# Patient Record
Sex: MALE | Race: WHITE | NOT HISPANIC OR LATINO | URBAN - METROPOLITAN AREA
[De-identification: names, ages, dates, MRNs, and addresses within clinical notes are randomized per-mention and may not be internally consistent; named-entity substitution may affect disease eponyms.]

---

## 2016-07-29 RX ORDER — MIDODRINE HYDROCHLORIDE 2.5 MG/1
1 TABLET ORAL
Qty: 0 | Refills: 0 | COMMUNITY
Start: 2016-07-29

## 2018-01-16 ENCOUNTER — INPATIENT (INPATIENT)
Facility: HOSPITAL | Age: 68
LOS: 0 days | Discharge: HOME | DRG: 312 | End: 2018-01-17
Attending: HOSPITALIST | Admitting: HOSPITALIST
Payer: MEDICARE

## 2018-01-16 VITALS
HEART RATE: 110 BPM | SYSTOLIC BLOOD PRESSURE: 113 MMHG | RESPIRATION RATE: 20 BRPM | TEMPERATURE: 98 F | DIASTOLIC BLOOD PRESSURE: 70 MMHG | OXYGEN SATURATION: 93 % | WEIGHT: 154.98 LBS

## 2018-01-16 DIAGNOSIS — Z96.7 PRESENCE OF OTHER BONE AND TENDON IMPLANTS: Chronic | ICD-10-CM

## 2018-01-16 LAB
ALBUMIN SERPL ELPH-MCNC: 3.6 G/DL — SIGNIFICANT CHANGE UP (ref 3.3–5)
ALP SERPL-CCNC: 65 U/L — SIGNIFICANT CHANGE UP (ref 40–120)
ALT FLD-CCNC: 6 U/L — LOW (ref 10–45)
ANION GAP SERPL CALC-SCNC: 14 MMOL/L — SIGNIFICANT CHANGE UP (ref 5–17)
APTT BLD: 46 SEC — HIGH (ref 27.5–37.4)
AST SERPL-CCNC: 11 U/L — SIGNIFICANT CHANGE UP (ref 10–40)
BASE EXCESS BLDV CALC-SCNC: -3.9 MMOL/L — SIGNIFICANT CHANGE UP
BASOPHILS NFR BLD AUTO: 0.2 % — SIGNIFICANT CHANGE UP (ref 0–2)
BILIRUB SERPL-MCNC: 0.4 MG/DL — SIGNIFICANT CHANGE UP (ref 0.2–1.2)
BLD GP AB SCN SERPL QL: NEGATIVE — SIGNIFICANT CHANGE UP
BUN SERPL-MCNC: 20 MG/DL — SIGNIFICANT CHANGE UP (ref 7–23)
CA-I SERPL-SCNC: 1.06 MMOL/L — LOW (ref 1.12–1.3)
CALCIUM SERPL-MCNC: 9.1 MG/DL — SIGNIFICANT CHANGE UP (ref 8.4–10.5)
CHLORIDE SERPL-SCNC: 104 MMOL/L — SIGNIFICANT CHANGE UP (ref 96–108)
CK MB CFR SERPL CALC: <1 NG/ML — SIGNIFICANT CHANGE UP (ref 0–6.7)
CK SERPL-CCNC: 21 U/L — LOW (ref 30–200)
CO2 SERPL-SCNC: 23 MMOL/L — SIGNIFICANT CHANGE UP (ref 22–31)
CREAT SERPL-MCNC: 0.85 MG/DL — SIGNIFICANT CHANGE UP (ref 0.5–1.3)
EOSINOPHIL NFR BLD AUTO: 0.6 % — SIGNIFICANT CHANGE UP (ref 0–6)
GAS PNL BLDV: 138 MMOL/L — SIGNIFICANT CHANGE UP (ref 138–146)
GAS PNL BLDV: SIGNIFICANT CHANGE UP
GAS PNL BLDV: SIGNIFICANT CHANGE UP
GLUCOSE SERPL-MCNC: 136 MG/DL — HIGH (ref 70–99)
HCO3 BLDV-SCNC: 23 MMOL/L — SIGNIFICANT CHANGE UP (ref 20–27)
HCT VFR BLD CALC: 29.9 % — LOW (ref 39–50)
HGB BLD-MCNC: 10.1 G/DL — LOW (ref 13–17)
INR BLD: 2.93 — HIGH (ref 0.88–1.16)
LACTATE SERPL-SCNC: 2.6 MMOL/L — HIGH (ref 0.5–2)
LYMPHOCYTES # BLD AUTO: 12.9 % — LOW (ref 13–44)
MCHC RBC-ENTMCNC: 30.1 PG — SIGNIFICANT CHANGE UP (ref 27–34)
MCHC RBC-ENTMCNC: 33.8 G/DL — SIGNIFICANT CHANGE UP (ref 32–36)
MCV RBC AUTO: 89 FL — SIGNIFICANT CHANGE UP (ref 80–100)
MONOCYTES NFR BLD AUTO: 7.9 % — SIGNIFICANT CHANGE UP (ref 2–14)
NEUTROPHILS NFR BLD AUTO: 78.4 % — HIGH (ref 43–77)
OB PNL STL: NEGATIVE — SIGNIFICANT CHANGE UP
PCO2 BLDV: 50 MMHG — SIGNIFICANT CHANGE UP (ref 41–51)
PH BLDV: 7.28 — LOW (ref 7.32–7.43)
PLATELET # BLD AUTO: 316 K/UL — SIGNIFICANT CHANGE UP (ref 150–400)
PO2 BLDV: 33 MMHG — SIGNIFICANT CHANGE UP
POTASSIUM BLDV-SCNC: 4.1 MMOL/L — SIGNIFICANT CHANGE UP (ref 3.5–4.9)
POTASSIUM SERPL-MCNC: 4.3 MMOL/L — SIGNIFICANT CHANGE UP (ref 3.5–5.3)
POTASSIUM SERPL-SCNC: 4.3 MMOL/L — SIGNIFICANT CHANGE UP (ref 3.5–5.3)
PROT SERPL-MCNC: 7 G/DL — SIGNIFICANT CHANGE UP (ref 6–8.3)
PROTHROM AB SERPL-ACNC: 33.2 SEC — HIGH (ref 9.8–12.7)
RAPID RVP RESULT: SIGNIFICANT CHANGE UP
RBC # BLD: 3.36 M/UL — LOW (ref 4.2–5.8)
RBC # FLD: 12.8 % — SIGNIFICANT CHANGE UP (ref 10.3–16.9)
RH IG SCN BLD-IMP: POSITIVE — SIGNIFICANT CHANGE UP
SAO2 % BLDV: 48 % — SIGNIFICANT CHANGE UP
SODIUM SERPL-SCNC: 141 MMOL/L — SIGNIFICANT CHANGE UP (ref 135–145)
TROPONIN T SERPL-MCNC: <0.01 NG/ML — SIGNIFICANT CHANGE UP (ref 0–0.01)
WBC # BLD: 14.2 K/UL — HIGH (ref 3.8–10.5)
WBC # FLD AUTO: 14.2 K/UL — HIGH (ref 3.8–10.5)

## 2018-01-16 PROCEDURE — 70450 CT HEAD/BRAIN W/O DYE: CPT | Mod: 26

## 2018-01-16 PROCEDURE — 99285 EMERGENCY DEPT VISIT HI MDM: CPT | Mod: 25

## 2018-01-16 PROCEDURE — 99223 1ST HOSP IP/OBS HIGH 75: CPT | Mod: GC

## 2018-01-16 PROCEDURE — 71045 X-RAY EXAM CHEST 1 VIEW: CPT | Mod: 26

## 2018-01-16 PROCEDURE — 93010 ELECTROCARDIOGRAM REPORT: CPT

## 2018-01-16 PROCEDURE — 99232 SBSQ HOSP IP/OBS MODERATE 35: CPT | Mod: GC

## 2018-01-16 RX ORDER — DEXTROSE 50 % IN WATER 50 %
25 SYRINGE (ML) INTRAVENOUS ONCE
Qty: 0 | Refills: 0 | Status: DISCONTINUED | OUTPATIENT
Start: 2018-01-16 | End: 2018-01-17

## 2018-01-16 RX ORDER — SODIUM CHLORIDE 9 MG/ML
1000 INJECTION INTRAMUSCULAR; INTRAVENOUS; SUBCUTANEOUS
Qty: 0 | Refills: 0 | Status: DISCONTINUED | OUTPATIENT
Start: 2018-01-16 | End: 2018-01-17

## 2018-01-16 RX ORDER — INSULIN LISPRO 100/ML
VIAL (ML) SUBCUTANEOUS
Qty: 0 | Refills: 0 | Status: DISCONTINUED | OUTPATIENT
Start: 2018-01-16 | End: 2018-01-17

## 2018-01-16 RX ORDER — SODIUM CHLORIDE 9 MG/ML
1000 INJECTION INTRAMUSCULAR; INTRAVENOUS; SUBCUTANEOUS ONCE
Qty: 0 | Refills: 0 | Status: COMPLETED | OUTPATIENT
Start: 2018-01-16 | End: 2018-01-16

## 2018-01-16 RX ORDER — DEXTROSE 50 % IN WATER 50 %
12.5 SYRINGE (ML) INTRAVENOUS ONCE
Qty: 0 | Refills: 0 | Status: DISCONTINUED | OUTPATIENT
Start: 2018-01-16 | End: 2018-01-17

## 2018-01-16 RX ORDER — TRANEXAMIC ACID 100 MG/ML
1000 INJECTION, SOLUTION INTRAVENOUS ONCE
Qty: 0 | Refills: 0 | Status: COMPLETED | OUTPATIENT
Start: 2018-01-16 | End: 2018-01-16

## 2018-01-16 RX ORDER — DEXTROSE 50 % IN WATER 50 %
1 SYRINGE (ML) INTRAVENOUS ONCE
Qty: 0 | Refills: 0 | Status: DISCONTINUED | OUTPATIENT
Start: 2018-01-16 | End: 2018-01-17

## 2018-01-16 RX ORDER — SODIUM CHLORIDE 9 MG/ML
1000 INJECTION, SOLUTION INTRAVENOUS
Qty: 0 | Refills: 0 | Status: DISCONTINUED | OUTPATIENT
Start: 2018-01-16 | End: 2018-01-17

## 2018-01-16 RX ORDER — PIPERACILLIN AND TAZOBACTAM 4; .5 G/20ML; G/20ML
4.5 INJECTION, POWDER, LYOPHILIZED, FOR SOLUTION INTRAVENOUS ONCE
Qty: 0 | Refills: 0 | Status: DISCONTINUED | OUTPATIENT
Start: 2018-01-16 | End: 2018-01-16

## 2018-01-16 RX ORDER — GLUCAGON INJECTION, SOLUTION 0.5 MG/.1ML
1 INJECTION, SOLUTION SUBCUTANEOUS ONCE
Qty: 0 | Refills: 0 | Status: DISCONTINUED | OUTPATIENT
Start: 2018-01-16 | End: 2018-01-17

## 2018-01-16 RX ADMIN — TRANEXAMIC ACID 220 MILLIGRAM(S): 100 INJECTION, SOLUTION INTRAVENOUS at 20:32

## 2018-01-16 RX ADMIN — SODIUM CHLORIDE 1000 MILLILITER(S): 9 INJECTION INTRAMUSCULAR; INTRAVENOUS; SUBCUTANEOUS at 20:21

## 2018-01-16 RX ADMIN — TRANEXAMIC ACID 220 MILLIGRAM(S): 100 INJECTION, SOLUTION INTRAVENOUS at 20:27

## 2018-01-16 RX ADMIN — SODIUM CHLORIDE 1000 MILLILITER(S): 9 INJECTION INTRAMUSCULAR; INTRAVENOUS; SUBCUTANEOUS at 20:22

## 2018-01-16 RX ADMIN — SODIUM CHLORIDE 125 MILLILITER(S): 9 INJECTION INTRAMUSCULAR; INTRAVENOUS; SUBCUTANEOUS at 19:39

## 2018-01-16 NOTE — H&P ADULT - PROBLEM SELECTOR PLAN 4
Patient in sinus, rate controlled. Not on any rate control medication as an outpatient.   -Currently on Eliquis 5mg ONCE daily (will have to check dose with PMD in AM) and will hold for now given INR ~3 and gum bleeding -Hold Metformin while inpatient  -As per patient's wife, she give him Lantus in the morning, but only if his glucose if >150. He did receive his dose this morning.  -Will decrease the dose to 10 units in the AM given patient will be on controlled diet inpatient and as per old records patient was on this dose and sent home on only 5 units Lantus given consistently well controlled glucose levels.

## 2018-01-16 NOTE — ED ADULT TRIAGE NOTE - OTHER COMPLAINTS
biba from doctors for dizziness , per EMS was hypotensive in office in the 60's/ pt on eliquis for a fib

## 2018-01-16 NOTE — ED PROVIDER NOTE - MEDICAL DECISION MAKING DETAILS
67 male with hypotension hx of orthostatic hypotension  unclear source  slight lactate elev  now normotensive seen and eval by icu who feel no indication for 7L or ICU  ok for floors  repeat lactate ordered

## 2018-01-16 NOTE — H&P ADULT - PROBLEM SELECTOR PLAN 9
No prophylaxis needed, patient on eliquis which will currently be held given coagulapathy and gum bleeding

## 2018-01-16 NOTE — CONSULT NOTE ADULT - ASSESSMENT
67y Male with a Hx of orthostatic hypotension, Diabetes Mellitus Type 2 complicated by neuropathy now wheelchair-bound for approx 3 years, Afib on Eliquis, Hypertension, hyperlipidemia, benign brain tumor (unclear etiology), and alcohol abuse presenting with hypotension (now resolved), leukocytosis, and gingival bleeding in the setting of NOAC and elevated INR.    #Hypotension  Long history of orthostatic hypotension on midodrine--which he took today--and poor functional status, predominantly bedbound with wheelchair use. Baseline BPs 80s-100s/60s-70s per outpatient BP log that wife has at bedside.   Recommendations:  -    #Leukocytosis  WBC 14 without any fevers/chills or localizing symptoms. Chest Xray is clear and urine is pending.   Recommendations:  -Check RVP.  -F/u UA and Cultures.    #Gingival bleeding      #Dispo  - 67y Male with a Hx of orthostatic hypotension, Diabetes Mellitus Type 2 complicated by neuropathy now wheelchair-bound for approx 3 years, Afib on Eliquis, Hypertension, hyperlipidemia, benign brain tumor (unclear etiology), and alcohol abuse presenting with hypotension (now resolved), leukocytosis, and gingival bleeding in the setting of NOAC and elevated INR.    #Hypotension  Long history of orthostatic hypotension on midodrine--which he took today--and poor functional status, predominantly bedbound with wheelchair use. Baseline BPs 80s-100s/60s-70s per outpatient BP log that wife has at bedside.   Recommendations:  -Check orthostatic BP. Consider additional IVFs prn.  -Increase midodrine to 5mg.    #Paroxysmal AFib  Currently in sinus rhythm with APCs. Not on beta blocker or CCB, presumably 2/2 hypotension.  Recommendations:  -Hold Eliquis pending resolution of INR.  -Consider discontinuing duloxetine as this may potentiate the anticoagulant effects of apixaban and other NOACs.    #Leukocytosis  WBC 14 without any fevers/chills or localizing symptoms. Chest Xray is clear and urine is pending.   Recommendations:  -Check RVP.  -F/u UA and Cultures.    #Gingival bleeding  Frequent gingival bleeding with tooth brushing. Resolved with tranexamic acid  Recommendations:  -Hold Eliquis.  -If recurrent bleeding consider DDAVP.    #Dispo  -No indication for MICU or telemetry at this time.

## 2018-01-16 NOTE — ED PROVIDER NOTE - OBJECTIVE STATEMENT
68 yo M with hx of brain CA Afib DM HTN on Eliquis x 1 month- with apparent dizziness and episode of hypotension in PMD office 2 hrs ago  no focal weakness no headache or neck stiffness, no visual changes - no abd pain or N/V  no melena or hematochezia or hematemesis- no epistaxis - no syncope or back pain-- sent for further evaluation 68 yo M with hx of orthostatic hypotension, brain CA Afib DM HTN on Eliquis x 1 month- with apparent dizziness and episode of hypotension in PMD office 2 hrs ago  no focal weakness no headache or neck stiffness, no visual changes - no abd pain or N/V  no melena or hematochezia or hematemesis- no epistaxis - no syncope or back pain-- sent for further evaluation

## 2018-01-16 NOTE — H&P ADULT - NSHPPHYSICALEXAM_GEN_ALL_CORE
.  VITAL SIGNS:  T(C): 36.6 (01-17-18 @ 00:43), Max: 36.7 (01-16-18 @ 17:44)  T(F): 97.9 (01-17-18 @ 00:43), Max: 98.1 (01-16-18 @ 17:44)  HR: 75 (01-17-18 @ 00:43) (73 - 110)  BP: 114/70 (01-17-18 @ 00:43) (101/58 - 138/71)  BP(mean): --  RR: 18 (01-17-18 @ 00:43) (16 - 20)  SpO2: 100% (01-17-18 @ 00:43) (93% - 100%)  Wt(kg): --    PHYSICAL EXAM:    Constitutional: WDWN resting comfortably in bed; NAD  Head: NC/AT  Eyes: PERRL, EOMI, clear conjunctiva  ENT: no nasal discharge or epistaxis; uvula midline, no oropharyngeal erythema or exudates; MMM.  Poor dentition, pooled dark blood in lower gum area.  Neck: supple; no JVD or thyromegaly  Respiratory: CTA B/L; no W/R/R, no retractions  Cardiac: +S1/S2; RRR; no M/R/G; PMI non-displaced  Gastrointestinal: soft, NT/ND; no rebound or guarding; +BSx4  Genitourinary: normal external genitalia, no suprapubic tenderness or distention  Back: spine midline, no bony tenderness or step-offs; no CVAT B/L  Extremities: WWP, no clubbing or cyanosis; no peripheral edema  Musculoskeletal: NROM x4; no joint swelling, tenderness or erythema  Vascular: 2+ DP/PT pulses B/L  Dermatologic: skin warm, dry and intact; no rashes, wounds, or scars. No ecchymoses.  Lymphatic: no submandibular or cervical LAD  Neurologic: AAOx3; CNII-XII grossly intact; no focal deficits. 5/5 strength all extremities, sensation intact and symmetrical.  Mild tremor right side (chronic)  Psychiatric: affect and characteristics of appearance, verbalizations, behaviors are appropriate

## 2018-01-16 NOTE — ED ADULT NURSE NOTE - CHPI ED SYMPTOMS NEG
no blurred vision/no loss of consciousness/no nausea/no numbness/no vomiting/no fever/no weakness/no change in level of consciousness/no confusion

## 2018-01-16 NOTE — H&P ADULT - PMH
Atrial fibrillation    Benign neoplasm of brain  diagnosed in 2007  Essential hypertension  HTN (hypertension)  Hyperlipidemia  HLD (hyperlipidemia)  Mononeuritis  Neuropathy  Type 2 diabetes mellitus  DM (diabetes mellitus) Atrial fibrillation    Benign neoplasm of brain  diagnosed in 2007  Hyperlipidemia  HLD (hyperlipidemia)  Mononeuritis  Neuropathy  Orthostatic hypotension    Type 2 diabetes mellitus  DM (diabetes mellitus)

## 2018-01-16 NOTE — H&P ADULT - PROBLEM SELECTOR PLAN 5
Continue Atorvastatin 40mg at bedtime As per patient's wife, patient is bedbound secondary to diabetic neuropathy  He was previously taking Cymbalta, but his wife stopped it 2 weeks ago because she was concerned it could lower his seizure threshold after reading the side effects.   As per ICU consult, Cymbalta can increase anticoagulation effects of NOACs.  Will continue to hold given coagulopathy on eliquis.

## 2018-01-16 NOTE — H&P ADULT - HISTORY OF PRESENT ILLNESS
Patient is a 67 year old male, PMH seizure disorder, orthostatic hypotension, afib on eliquis, wheelchair bound secondary to neuropathy, DMII , presenting with hypotension from outpatient office visit. Patient was going to a disability physical evaluation by Dr. Dan (not his usual PMD), and was complaining of weakness/dizziness. His BP was initially 80s/50s and when EMS arrived, decreased to 60s/40s. Of note, as per his wife this is an ongoing issue, and he was started on Midrodrine on Dec 12th, 2017. His wife has a log of his BPs at home, usually runs SBP 90s-100, with some readings SBP 70s-80s. She usually takes his pressure lying down, but he does feel dizzy/lightheaded when he sits up. He mainly is bed bound and gets around in a wheel chair. His last seizure was over 2 months ago and he is compliant with Keppra.  Of note, wife states she aggressively brushed his teeth this morning because he was going to the doctor and has poor dentition 2/2 not going to the dentist since 2010.  Currently patient feels improved s/p fluids, denies dizziness. Patient is a 67 year old male, PMH seizure disorder, orthostatic hypotension, afib on eliquis, wheelchair bound secondary to neuropathy, DMII , presenting with hypotension from outpatient office visit. Patient was going to a disability physical evaluation by Dr. Dan (not his usual PMD), and was complaining of weakness/dizziness. His BP was initially 80s/50s and when EMS arrived, decreased to 60s/40s. Of note, as per his wife this is an ongoing issue, and he was started on Midrodrine on Dec 12th, 2017. His wife has a log of his BPs at home, usually runs SBP 90s-100, with some readings SBP 70s-80s. She usually takes his pressure lying down, but he does feel dizzy/lightheaded when he sits up. He mainly is bed bound and gets around in a wheel chair. His last seizure was over 2 months ago and he is compliant with Keppra.  Of note, wife states she aggressively brushed his teeth this morning because he was going to the doctor and has poor dentition 2/2 not going to the dentist since 2010. Also, his wife stopped giving him Cymbalta 2 weeks ago because she was reading the information packet and saw that it can decreased seizure threshold.  Currently patient feels improved s/p fluids, denies dizziness. Patient is a 67 year old male, PMH seizure disorder, orthostatic hypotension, afib on eliquis, wheelchair bound secondary to neuropathy, DMII , presenting with hypotension from outpatient office visit. Patient was going to a disability physical evaluation by Dr. Dan (not his usual PMD), and was complaining of weakness/dizziness. His BP was initially 80s/50s and when EMS arrived, decreased to 60s/40s. Of note, as per his wife this is an ongoing issue, and he was started on Midrodrine on Dec 12th, 2017. His wife has a log of his BPs at home, usually runs SBP 90s-100, with some readings SBP 70s-80s. She usually takes his pressure lying down, but he does feel dizzy/lightheaded when he sits up. He mainly is bed bound and gets around in a wheel chair. His last seizure was over 2 months ago and he is compliant with Keppra.  Of note, wife states she aggressively brushed his teeth this morning because he was going to the doctor and has poor dentition 2/2 not going to the dentist since 2010. Also, his wife stopped giving him Cymbalta 2 weeks ago because she was reading the information packet and saw that it can decrease seizure threshold.  Currently patient feels improved s/p fluids, denies dizziness.

## 2018-01-16 NOTE — H&P ADULT - ATTENDING COMMENTS
Pt seen and examined at bedside on 1/17/2017 @ 430 am    Agree with HPI, ROS as above.     VS, Labs, FH, SH, allergies, medications, imaging reviewed. I personally reviewed the EKG - NSR. I discussed the case with Dr. Bacon (ED attending) - patient with orthostatic hypotension, now improved but also coagulopathy will need admission overnight. Agree with physical exam as above    A/P: Patient is a 67 year old male, PMH seizure disorder, orthostatic hypotension, afib on Eliquis, wheelchair bound secondary to neuropathy, DMII , presenting with hypotension from outpatient office visit, improved s/p fluids, also found to have gum bleeding and coagulopathy.    **Orthostatic Hypotension  -Pt with episode of symptomatic orthostatic hypotension c/w patient's baseline at home (has symptoms when sitting up)  -Currently resolved S/P IVF, likely chronic 2/2 long standing DM and immobility (patient spends most of his time in bed)  -Will increase dose of Midodrine    **Coagulopathy  -Pt with elevated INR/PTT/PT in the setting of Eliquis use, with associated gum oozing s/p vigorous dental hygiene  -Gum oozing currently resolved s/p application of tranexamic acid  -Hold Eliquis overnight  -Will need to obtain collateral in AM from patient's PMD regarding dosage of Eliquis, previous bleed with Xarelto, possible Heme consult in AM  -CBC stable, pt HD stable    Plan otherwise as outlined above....

## 2018-01-16 NOTE — CONSULT NOTE ADULT - SUBJECTIVE AND OBJECTIVE BOX
CHIEF COMPLAINT:  Patient is a 67y old  Male who presents with a chief complaint of hypotension.    ED Provider Note reviewed in detail.     HPI:  MARIBEL GONZALEZ is a 67y Male with a Hx of orthostatic hypotension, Diabetes Mellitus Type 2 complicated by neuropathy now wheelchair-bound for approx 3 years, Afib on Eliquis, Hypertension, hyperlipidemia, benign brain tumor (unclear etiology), and alcohol abuse who presented with hypotension. At baseline, patient has known orthostatic hypotension with average BPs in 80s-100s/60s-70s. He has poor functional status, spending the majority of his time in bed, and is noted to have worsening episodes of orthostasis whenever he is in his wheelchair. He takes midodrine 2.5mg BID for orthostatic hypotension and reports taking his medication today. Today, he was taken by his spouse--who is also his caretaker--for a disability appointment. While there he complained of lightheadedness and was found to have a SBP in the 70s. EMS was called for sympotmatic hypotension and on arrival they would his BP to be 60s/40s. At this time he states that his lightheadedness as resolved after receiving IV fluids. He denies any fevers/chills, night sweats, sick contacts, sore throat, rhinorrhea, cough, shortness of breath, nausea/vomiting/diarrhea, flank pain, and dysuria. He denies palpitations and chest pain. His wife reports that he has had weight loss and decreased appetite over the past 2-3 months. She also states that at baseline he is more interactive than he is at present.    He is also complaining of gum bleeding. He reports frequent episodes while on Xarelto and was recently switched to Eliquis in an attempt to decrease frequency and severity. He denies any history of GI bleeding. Has never had colonoscopy or EGD. Denies nosebleeds. Has never taken coumadin. His wife states that his last dental appointment was in 2010. She reports gum bleeding with tooth brushing, and states that he brushed vigorously today prior to his disability appointment.    In the ED, he was given 2L NS, started on maintenance at 125 cc/hr, tranexamic acid oral and IV, and Zosyn.    PAST MEDICAL & SURGICAL HISTORY:  Atrial fibrillation  Essential hypertension: HTN (hypertension)  Type 2 diabetes mellitus: DM (diabetes mellitus)  Hyperlipidemia: HLD (hyperlipidemia)  Benign neoplasm of brain: diagnosed in 2007  Mononeuritis: Neuropathy  S/P ORIF (open reduction internal fixation) fracture: Right Humerus  Alcohol abuse disorder  orthostatic hypotension    FAMILY HISTORY:  Family history of colon cancer (Father)  Family history of diabetes mellitus    SOCIAL HISTORY:  former smoker  alcohol abuse, now abstinent  lives at home, wife is primary caretaker  wheelchair bound, but spends the majority of his time in bed    REVIEW OF SYSTEMS:  10 system review of systems negative except as per HPI    Vital Signs Last 24 Hrs  T(C): 36.6 (16 Jan 2018 20:33), Max: 36.7 (16 Jan 2018 17:44)  T(F): 97.8 (16 Jan 2018 20:33), Max: 98.1 (16 Jan 2018 17:44)  HR: 73 (16 Jan 2018 20:33) (73 - 110)  BP: 138/71 (16 Jan 2018 20:33) (104/63 - 138/71)  BP(mean): --  RR: 16 (16 Jan 2018 20:33) (16 - 20)  SpO2: 100% (16 Jan 2018 20:33) (93% - 100%)      Weight (kg): 70.3 (01-16 @ 17:44)      POCT Blood Glucose.: 137 mg/dL (16 Jan 2018 17:41)      PHYSICAL EXAM:  Gen:       no acute distress, disheveled  Skin:       bilateral, perioral pustules, no rashes on the trunk or extremities  HEENT:  +gingival bleeding  Neck:     no JVD  Cardiac: regular rate and rhythm, S1/S2 present, no murmur/gallop/rub  Pulm:     clear to auscultation bilaterally, no wheezes/crackles  Abd:      soft/nontender/nondistended, normoactive bowel sounds  :       no CVA tenderness  Vasc:     warm and well perfused, 2+ dorsalis pedis and radial pulses, no pedal edema  Ext:        normal range of motion, atraumatic  Neuro:   awake, alert and oriented x3, Cranial Nerves II-XII grossly intact    Home Medications:  atorvastatin 20 mg oral tablet: 1 tab(s) orally once a day (at bedtime) (16 Jan 2018 18:04)  DULoxetine 60 mg oral delayed release capsule: 1 cap(s) orally once a day (16 Jan 2018 18:04)  Eliquis 5mg BID  (16 Jan 2018 18:04)  folic acid 1 mg oral tablet: 1 tab(s) orally once a day (16 Jan 2018 18:04)  HumaLOG KwikPen 100 units/mL subcutaneous solution:  subcutaneous Before Meals and at Bedtime Correctional Scale  (16 Jan 2018 18:04)  Lantus Solostar Pen 100 units/mL subcutaneous solution: 5 unit(s) subcutaneous once a day (at bedtime). Note: Pt has not required any lantus in hospital. Was taking 20 units BID at home. hgb 1c 7.1. Increased dose based on requirements.  (16 Jan 2018 18:04)  midodrine 2.5 mg oral tablet: 1 tab(s) orally 2 times a day (16 Jan 2018 18:04)  Multiple Vitamins oral tablet: 1 tab(s) orally once a day (16 Jan 2018 18:04)  thiamine 100 mg oral tablet: 1 tab(s) orally once a day (16 Jan 2018 18:04)        Allergies  Allergy Status Unknown    Intolerances  codeine (dependence)    LABS:                        10.1   14.2  )-----------( 316      ( 16 Jan 2018 18:13 )             29.9    Mean Cell Volume: 89.0 fL (01-16 @ 18:13)    01-16    141  |  104  |  20  ----------------------------<  136<H>  4.3   |  23  |  0.85    Ca    9.1      16 Jan 2018 18:13    TPro  7.0  /  Alb  3.6  /  TBili  0.4  /  DBili  x   /  AST  11  /  ALT  6<L>  /  AlkPhos  65  01-16    PT/INR - ( 16 Jan 2018 18:13 )   PT: 33.2 sec;   INR: 2.93     PTT - ( 16 Jan 2018 18:13 )  PTT:46.0 sec      MICROBIOLOGY: REVIEWED  culture pending    CARDIOLOGY: REVIEWED  EKG (my read): normal sinus rhythm with APCs    RADIOLOGY: REVIEWED  Xray chest (my read): flattening of both hemidiaphragms, no infiltrate

## 2018-01-16 NOTE — ED ADULT NURSE NOTE - FAMILY HISTORY
Family history of diabetes mellitus     Father  Still living? Unknown  Family history of colon cancer, Age at diagnosis: Age Unknown

## 2018-01-16 NOTE — H&P ADULT - PROBLEM SELECTOR PLAN 8
Diabetic diet Unclear what patient's psychiatric diagnosis is, but will continue current psychiatric meds. Need collateral information in the AM.  -Continue Risperidone 1mg in the AM, mid day, and 2mg at bedtime  -Continue Mirtazapine 15mg at bedtime

## 2018-01-16 NOTE — ED ADULT NURSE NOTE - NS ED NOTE ABUSE RESPONSE YN
Yes
Airway patent, Nasal mucosa clear. Mouth with normal mucosa. Throat has no vesicles, no oropharyngeal exudates and uvula is midline.

## 2018-01-16 NOTE — H&P ADULT - PROBLEM SELECTOR PLAN 7
No prophylaxis needed, patient on eliquis which will currently be held given coagulapathy and gum bleeding Continue Atorvastatin 40mg at bedtime

## 2018-01-16 NOTE — H&P ADULT - PROBLEM SELECTOR PLAN 6
Unclear what patient's psychiatric diagnosis is, but will continue current psychiatric meds. Need collateral information in the AM.  -Continue Risperidone 1mg in the AM, mid day, and 2mg at bedtime  -Continue Mirtazapine 15mg at bedtime Patient in sinus, rate controlled. Not on any rate control medication as an outpatient.   -Currently on Eliquis 5mg ONCE daily (will have to check dose with PMD in AM) and will hold for now given INR ~3 and gum bleeding

## 2018-01-16 NOTE — H&P ADULT - ASSESSMENT
Patient is a 67 year old male, PMH seizure disorder, orthostatic hypotension, afib on eliquis, wheelchair bound secondary to neuropathy, DMII , presenting with hypotension from outpatient office visit, improved s/p fluids, also found to have gum bleeding and coagulopathy.

## 2018-01-16 NOTE — H&P ADULT - NSHPLABSRESULTS_GEN_ALL_CORE
.  LABS:                         10.1   14.2  )-----------( 316      ( 2018 18:13 )             29.9         141  |  104  |  20  ----------------------------<  136<H>  4.3   |  23  |  0.85    Ca    9.1      2018 18:13    TPro  7.0  /  Alb  3.6  /  TBili  0.4  /  DBili  x   /  AST  11  /  ALT  6<L>  /  AlkPhos  65      PT/INR - ( 2018 18:13 )   PT: 33.2 sec;   INR: 2.93          PTT - ( 2018 18:13 )  PTT:46.0 sec  Urinalysis Basic - ( 2018 00:30 )    Color: Yellow / Appearance: Clear / S.015 / pH: x  Gluc: x / Ketone: NEGATIVE  / Bili: Negative / Urobili: 0.2 E.U./dL   Blood: x / Protein: NEGATIVE mg/dL / Nitrite: NEGATIVE   Leuk Esterase: Small / RBC: < 5 /HPF / WBC > 10 /HPF   Sq Epi: x / Non Sq Epi: 0-5 /HPF / Bacteria: Present /HPF      CARDIAC MARKERS ( 2018 18:13 )  x     / <0.01 ng/mL / 21 U/L / x     / <1.0 ng/mL    Lactate, Blood: 2.6 mmoL/L ( @ 21:39)      RADIOLOGY, EKG & ADDITIONAL TESTS: Reviewed. .  LABS:                         10.1   14.2  )-----------( 316      ( 2018 18:13 )             29.9         141  |  104  |  20  ----------------------------<  136<H>  4.3   |  23  |  0.85    Ca    9.1      2018 18:13    TPro  7.0  /  Alb  3.6  /  TBili  0.4  /  DBili  x   /  AST  11  /  ALT  6<L>  /  AlkPhos  65      PT/INR - ( 2018 18:13 )   PT: 33.2 sec;   INR: 2.93          PTT - ( 2018 18:13 )  PTT:46.0 sec  Urinalysis Basic - ( 2018 00:30 )    Color: Yellow / Appearance: Clear / S.015 / pH: x  Gluc: x / Ketone: NEGATIVE  / Bili: Negative / Urobili: 0.2 E.U./dL   Blood: x / Protein: NEGATIVE mg/dL / Nitrite: NEGATIVE   Leuk Esterase: Small / RBC: < 5 /HPF / WBC > 10 /HPF   Sq Epi: x / Non Sq Epi: 0-5 /HPF / Bacteria: Present /HPF      CARDIAC MARKERS ( 2018 18:13 )  x     / <0.01 ng/mL / 21 U/L / x     / <1.0 ng/mL    Lactate, Blood: 2.6 mmoL/L ( @ 21:39)      RADIOLOGY, EKG & ADDITIONAL TESTS: Reviewed.  EKG- NSR with APCs  CXR- no consolidation/infiltrates

## 2018-01-16 NOTE — ED ADULT NURSE NOTE - PMH
Atrial fibrillation    Benign neoplasm of brain  diagnosed in 2007  Essential hypertension  HTN (hypertension)  Hyperlipidemia  HLD (hyperlipidemia)  Mononeuritis  Neuropathy  Type 2 diabetes mellitus  DM (diabetes mellitus)

## 2018-01-16 NOTE — ED PROVIDER NOTE - CARE PLAN
Principal Discharge DX:	Hypotension  Secondary Diagnosis:	Coagulopathy  Secondary Diagnosis:	Dizziness

## 2018-01-16 NOTE — CONSULT NOTE ADULT - ATTENDING COMMENTS
This patient was evaluated with the resident and management decisions were made.  I agree with the A/P; see above for the details.  A 66 y/o male with DM2, PAF on eliquis, orthostatic hypotension on midodrine presented with hypotension which improved with IVF and was back to his baseline mental status.  -hypotension (hypovolumic)  -afib, rate controlled  -anemia with bleeding gum which has stabilized  >increase midodrine  >IVF  >this patient is hemodynamically stable and does not need the MICU or [facility]; ypu may reconsult us as needed,

## 2018-01-16 NOTE — H&P ADULT - PROBLEM SELECTOR PLAN 2
Elevated INR and PTT, possibly secondary to eliquis, along with possible interaction of eliquis and Duloxetine (although has been held by his wife for 2 weeks.)  -Given coagulopathy and gingival bleeding, holding Eliquis. No other sources of bleeding at this time.  -Also need clarification of Eliquis dosing as he is prescribed 5mg once per day

## 2018-01-16 NOTE — H&P ADULT - PROBLEM SELECTOR PLAN 1
Patient with known history of orthostatic hypotension, recently started on low dose of midodrine (Dec 12, 2017), presenting with orthostatic hypotension episode (symptomatic upon sitting up, hypotensive to SBP 60s that responded to fluids, s/p 3LNS)   -Will increase to Midodrine 5mg BID  -Monitor BP

## 2018-01-16 NOTE — ED ADULT NURSE NOTE - OBJECTIVE STATEMENT
Pt presents to ED c/o dizziness. upon assessment, pt noted to have dry blood around oral mucosa. Pt states, " I am on eliquis and this happens often" as per wife, "this is an improvement from the Xarelto he was taking" " as per wife, he becomes dizzy, if he is in his wheelchair for long periods of time" Pt AAO x 3 speech is clear and coherent breathing even and unlabored. denies headache, chest pain and SOB at this time. Denies dark tar stool

## 2018-01-16 NOTE — H&P ADULT - PROBLEM SELECTOR PLAN 3
-Hold Metformin while inpatient  -As per patient's wife, she give him Lantus in the morning, but only if his glucose if >150. He did receive his dose this morning.  -Will decrease the dose to 10 units in the AM given patient will be on controlled diet inpatient and as per old records patient was on this dose and sent home on only 5 units lantus given consistently well controlled glucose levels. Patient with baseline Hgb ~8-9, normocytic. Stable. No signs of bleeding other than gingival.   Unclear etiology, will check iron studies, reticulocyte count, haptoglobin, LDH

## 2018-01-16 NOTE — H&P ADULT - NSHPSOCIALHISTORY_GEN_ALL_CORE
Wheelchair bound, lives at home with his wife who is also his caregiver  Current smoker 1ppd  No IVDA

## 2018-01-17 ENCOUNTER — TRANSCRIPTION ENCOUNTER (OUTPATIENT)
Age: 68
End: 2018-01-17

## 2018-01-17 VITALS
HEART RATE: 82 BPM | DIASTOLIC BLOOD PRESSURE: 76 MMHG | RESPIRATION RATE: 17 BRPM | OXYGEN SATURATION: 96 % | TEMPERATURE: 98 F | SYSTOLIC BLOOD PRESSURE: 149 MMHG

## 2018-01-17 DIAGNOSIS — Z29.9 ENCOUNTER FOR PROPHYLACTIC MEASURES, UNSPECIFIED: ICD-10-CM

## 2018-01-17 DIAGNOSIS — R63.8 OTHER SYMPTOMS AND SIGNS CONCERNING FOOD AND FLUID INTAKE: ICD-10-CM

## 2018-01-17 DIAGNOSIS — E78.5 HYPERLIPIDEMIA, UNSPECIFIED: ICD-10-CM

## 2018-01-17 DIAGNOSIS — E11.9 TYPE 2 DIABETES MELLITUS WITHOUT COMPLICATIONS: ICD-10-CM

## 2018-01-17 DIAGNOSIS — G62.9 POLYNEUROPATHY, UNSPECIFIED: ICD-10-CM

## 2018-01-17 DIAGNOSIS — F99 MENTAL DISORDER, NOT OTHERWISE SPECIFIED: ICD-10-CM

## 2018-01-17 DIAGNOSIS — D64.9 ANEMIA, UNSPECIFIED: ICD-10-CM

## 2018-01-17 DIAGNOSIS — I95.1 ORTHOSTATIC HYPOTENSION: ICD-10-CM

## 2018-01-17 DIAGNOSIS — I48.91 UNSPECIFIED ATRIAL FIBRILLATION: ICD-10-CM

## 2018-01-17 DIAGNOSIS — D68.9 COAGULATION DEFECT, UNSPECIFIED: ICD-10-CM

## 2018-01-17 LAB
ANION GAP SERPL CALC-SCNC: 13 MMOL/L — SIGNIFICANT CHANGE UP (ref 5–17)
APPEARANCE UR: CLEAR — SIGNIFICANT CHANGE UP
APTT BLD: 32.4 SEC — SIGNIFICANT CHANGE UP (ref 27.5–37.4)
BACTERIA # UR AUTO: PRESENT /HPF
BILIRUB UR-MCNC: NEGATIVE — SIGNIFICANT CHANGE UP
BUN SERPL-MCNC: 16 MG/DL — SIGNIFICANT CHANGE UP (ref 7–23)
CALCIUM SERPL-MCNC: 8.6 MG/DL — SIGNIFICANT CHANGE UP (ref 8.4–10.5)
CHLORIDE SERPL-SCNC: 105 MMOL/L — SIGNIFICANT CHANGE UP (ref 96–108)
CO2 SERPL-SCNC: 22 MMOL/L — SIGNIFICANT CHANGE UP (ref 22–31)
COLOR SPEC: YELLOW — SIGNIFICANT CHANGE UP
CREAT SERPL-MCNC: 0.64 MG/DL — SIGNIFICANT CHANGE UP (ref 0.5–1.3)
DIFF PNL FLD: NEGATIVE — SIGNIFICANT CHANGE UP
EPI CELLS # UR: SIGNIFICANT CHANGE UP /HPF (ref 0–5)
FERRITIN SERPL-MCNC: 44.2 NG/ML — SIGNIFICANT CHANGE UP (ref 30–400)
GLUCOSE BLDC GLUCOMTR-MCNC: 108 MG/DL — HIGH (ref 70–99)
GLUCOSE BLDC GLUCOMTR-MCNC: 173 MG/DL — HIGH (ref 70–99)
GLUCOSE SERPL-MCNC: 122 MG/DL — HIGH (ref 70–99)
GLUCOSE UR QL: NEGATIVE — SIGNIFICANT CHANGE UP
HAPTOGLOB SERPL-MCNC: 216 MG/DL — HIGH (ref 34–200)
HCT VFR BLD CALC: 29.5 % — LOW (ref 39–50)
HCV AB S/CO SERPL IA: 0.21 S/CO — SIGNIFICANT CHANGE UP
HCV AB SERPL-IMP: SIGNIFICANT CHANGE UP
HGB BLD-MCNC: 9.8 G/DL — LOW (ref 13–17)
INR BLD: 1.54 — HIGH (ref 0.88–1.16)
IRON SATN MFR SERPL: 29 % — SIGNIFICANT CHANGE UP (ref 16–55)
IRON SATN MFR SERPL: 67 UG/DL — SIGNIFICANT CHANGE UP (ref 45–165)
KETONES UR-MCNC: NEGATIVE — SIGNIFICANT CHANGE UP
LDH SERPL L TO P-CCNC: 104 U/L — SIGNIFICANT CHANGE UP (ref 50–242)
LEUKOCYTE ESTERASE UR-ACNC: (no result)
MCHC RBC-ENTMCNC: 30 PG — SIGNIFICANT CHANGE UP (ref 27–34)
MCHC RBC-ENTMCNC: 33.2 G/DL — SIGNIFICANT CHANGE UP (ref 32–36)
MCV RBC AUTO: 90.2 FL — SIGNIFICANT CHANGE UP (ref 80–100)
NITRITE UR-MCNC: NEGATIVE — SIGNIFICANT CHANGE UP
PH UR: 6 — SIGNIFICANT CHANGE UP (ref 5–8)
PLATELET # BLD AUTO: 312 K/UL — SIGNIFICANT CHANGE UP (ref 150–400)
POTASSIUM SERPL-MCNC: 4 MMOL/L — SIGNIFICANT CHANGE UP (ref 3.5–5.3)
POTASSIUM SERPL-SCNC: 4 MMOL/L — SIGNIFICANT CHANGE UP (ref 3.5–5.3)
PROT UR-MCNC: NEGATIVE MG/DL — SIGNIFICANT CHANGE UP
PROTHROM AB SERPL-ACNC: 17.2 SEC — HIGH (ref 9.8–12.7)
RBC # BLD: 3.27 M/UL — LOW (ref 4.2–5.8)
RBC # BLD: 3.27 M/UL — LOW (ref 4.2–5.8)
RBC # FLD: 13 % — SIGNIFICANT CHANGE UP (ref 10.3–16.9)
RBC CASTS # UR COMP ASSIST: < 5 /HPF — SIGNIFICANT CHANGE UP
RETICS/RBC NFR: 1.1 % — SIGNIFICANT CHANGE UP (ref 0.5–2.5)
SODIUM SERPL-SCNC: 140 MMOL/L — SIGNIFICANT CHANGE UP (ref 135–145)
SP GR SPEC: 1.01 — SIGNIFICANT CHANGE UP (ref 1–1.03)
TIBC SERPL-MCNC: 234 UG/DL — SIGNIFICANT CHANGE UP (ref 220–430)
TRANSFERRIN SERPL-MCNC: 202 MG/DL — SIGNIFICANT CHANGE UP (ref 200–360)
UIBC SERPL-MCNC: 167 UG/DL — SIGNIFICANT CHANGE UP (ref 110–370)
UROBILINOGEN FLD QL: 0.2 E.U./DL — SIGNIFICANT CHANGE UP
WBC # BLD: 9.2 K/UL — SIGNIFICANT CHANGE UP (ref 3.8–10.5)
WBC # FLD AUTO: 9.2 K/UL — SIGNIFICANT CHANGE UP (ref 3.8–10.5)
WBC UR QL: > 10 /HPF

## 2018-01-17 PROCEDURE — 84132 ASSAY OF SERUM POTASSIUM: CPT

## 2018-01-17 PROCEDURE — 36415 COLL VENOUS BLD VENIPUNCTURE: CPT

## 2018-01-17 PROCEDURE — 71045 X-RAY EXAM CHEST 1 VIEW: CPT

## 2018-01-17 PROCEDURE — 87186 SC STD MICRODIL/AGAR DIL: CPT

## 2018-01-17 PROCEDURE — 87798 DETECT AGENT NOS DNA AMP: CPT

## 2018-01-17 PROCEDURE — 70450 CT HEAD/BRAIN W/O DYE: CPT

## 2018-01-17 PROCEDURE — 84295 ASSAY OF SERUM SODIUM: CPT

## 2018-01-17 PROCEDURE — 87486 CHLMYD PNEUM DNA AMP PROBE: CPT

## 2018-01-17 PROCEDURE — 82803 BLOOD GASES ANY COMBINATION: CPT

## 2018-01-17 PROCEDURE — 85045 AUTOMATED RETICULOCYTE COUNT: CPT

## 2018-01-17 PROCEDURE — 87150 DNA/RNA AMPLIFIED PROBE: CPT

## 2018-01-17 PROCEDURE — 80053 COMPREHEN METABOLIC PANEL: CPT

## 2018-01-17 PROCEDURE — 83550 IRON BINDING TEST: CPT

## 2018-01-17 PROCEDURE — 82962 GLUCOSE BLOOD TEST: CPT

## 2018-01-17 PROCEDURE — 85730 THROMBOPLASTIN TIME PARTIAL: CPT

## 2018-01-17 PROCEDURE — 86850 RBC ANTIBODY SCREEN: CPT

## 2018-01-17 PROCEDURE — 84484 ASSAY OF TROPONIN QUANT: CPT

## 2018-01-17 PROCEDURE — 80048 BASIC METABOLIC PNL TOTAL CA: CPT

## 2018-01-17 PROCEDURE — 99285 EMERGENCY DEPT VISIT HI MDM: CPT | Mod: 25

## 2018-01-17 PROCEDURE — 87086 URINE CULTURE/COLONY COUNT: CPT

## 2018-01-17 PROCEDURE — 82553 CREATINE MB FRACTION: CPT

## 2018-01-17 PROCEDURE — 82330 ASSAY OF CALCIUM: CPT

## 2018-01-17 PROCEDURE — 81001 URINALYSIS AUTO W/SCOPE: CPT

## 2018-01-17 PROCEDURE — 86901 BLOOD TYPING SEROLOGIC RH(D): CPT

## 2018-01-17 PROCEDURE — 82550 ASSAY OF CK (CPK): CPT

## 2018-01-17 PROCEDURE — 84443 ASSAY THYROID STIM HORMONE: CPT

## 2018-01-17 PROCEDURE — 87040 BLOOD CULTURE FOR BACTERIA: CPT

## 2018-01-17 PROCEDURE — 86900 BLOOD TYPING SEROLOGIC ABO: CPT

## 2018-01-17 PROCEDURE — 86803 HEPATITIS C AB TEST: CPT

## 2018-01-17 PROCEDURE — 96374 THER/PROPH/DIAG INJ IV PUSH: CPT

## 2018-01-17 PROCEDURE — 83605 ASSAY OF LACTIC ACID: CPT

## 2018-01-17 PROCEDURE — 83615 LACTATE (LD) (LDH) ENZYME: CPT

## 2018-01-17 PROCEDURE — 85027 COMPLETE CBC AUTOMATED: CPT

## 2018-01-17 PROCEDURE — 85025 COMPLETE CBC W/AUTO DIFF WBC: CPT

## 2018-01-17 PROCEDURE — 87581 M.PNEUMON DNA AMP PROBE: CPT

## 2018-01-17 PROCEDURE — 84466 ASSAY OF TRANSFERRIN: CPT

## 2018-01-17 PROCEDURE — 87633 RESP VIRUS 12-25 TARGETS: CPT

## 2018-01-17 PROCEDURE — 83036 HEMOGLOBIN GLYCOSYLATED A1C: CPT

## 2018-01-17 PROCEDURE — 82728 ASSAY OF FERRITIN: CPT

## 2018-01-17 PROCEDURE — 99239 HOSP IP/OBS DSCHRG MGMT >30: CPT

## 2018-01-17 PROCEDURE — 93005 ELECTROCARDIOGRAM TRACING: CPT

## 2018-01-17 PROCEDURE — 83010 ASSAY OF HAPTOGLOBIN QUANT: CPT

## 2018-01-17 PROCEDURE — 85610 PROTHROMBIN TIME: CPT

## 2018-01-17 RX ORDER — ATORVASTATIN CALCIUM 80 MG/1
20 TABLET, FILM COATED ORAL AT BEDTIME
Qty: 0 | Refills: 0 | Status: DISCONTINUED | OUTPATIENT
Start: 2018-01-17 | End: 2018-01-17

## 2018-01-17 RX ORDER — APIXABAN 2.5 MG/1
5 TABLET, FILM COATED ORAL EVERY 12 HOURS
Qty: 0 | Refills: 0 | Status: DISCONTINUED | OUTPATIENT
Start: 2018-01-17 | End: 2018-01-17

## 2018-01-17 RX ORDER — APIXABAN 2.5 MG/1
0 TABLET, FILM COATED ORAL
Qty: 0 | Refills: 0 | COMMUNITY

## 2018-01-17 RX ORDER — APIXABAN 2.5 MG/1
1 TABLET, FILM COATED ORAL
Qty: 0 | Refills: 0 | COMMUNITY

## 2018-01-17 RX ORDER — SODIUM CHLORIDE 9 MG/ML
1000 INJECTION INTRAMUSCULAR; INTRAVENOUS; SUBCUTANEOUS
Qty: 0 | Refills: 0 | Status: DISCONTINUED | OUTPATIENT
Start: 2018-01-17 | End: 2018-01-17

## 2018-01-17 RX ORDER — MIDODRINE HYDROCHLORIDE 2.5 MG/1
5 TABLET ORAL EVERY 12 HOURS
Qty: 0 | Refills: 0 | Status: DISCONTINUED | OUTPATIENT
Start: 2018-01-17 | End: 2018-01-17

## 2018-01-17 RX ORDER — MIDODRINE HYDROCHLORIDE 2.5 MG/1
2.5 TABLET ORAL EVERY 12 HOURS
Qty: 0 | Refills: 0 | Status: DISCONTINUED | OUTPATIENT
Start: 2018-01-17 | End: 2018-01-17

## 2018-01-17 RX ORDER — RISPERIDONE 4 MG/1
1 TABLET ORAL DAILY
Qty: 0 | Refills: 0 | Status: DISCONTINUED | OUTPATIENT
Start: 2018-01-17 | End: 2018-01-17

## 2018-01-17 RX ORDER — MIRTAZAPINE 45 MG/1
15 TABLET, ORALLY DISINTEGRATING ORAL AT BEDTIME
Qty: 0 | Refills: 0 | Status: DISCONTINUED | OUTPATIENT
Start: 2018-01-17 | End: 2018-01-17

## 2018-01-17 RX ORDER — MIDODRINE HYDROCHLORIDE 2.5 MG/1
1 TABLET ORAL
Qty: 90 | Refills: 0
Start: 2018-01-17 | End: 2018-02-15

## 2018-01-17 RX ORDER — MIDODRINE HYDROCHLORIDE 2.5 MG/1
5 TABLET ORAL THREE TIMES A DAY
Qty: 0 | Refills: 0 | Status: DISCONTINUED | OUTPATIENT
Start: 2018-01-17 | End: 2018-01-17

## 2018-01-17 RX ORDER — RISPERIDONE 4 MG/1
1 TABLET ORAL EVERY 24 HOURS
Qty: 0 | Refills: 0 | Status: DISCONTINUED | OUTPATIENT
Start: 2018-01-18 | End: 2018-01-17

## 2018-01-17 RX ORDER — RISPERIDONE 4 MG/1
2 TABLET ORAL AT BEDTIME
Qty: 0 | Refills: 0 | Status: DISCONTINUED | OUTPATIENT
Start: 2018-01-17 | End: 2018-01-17

## 2018-01-17 RX ORDER — INSULIN GLARGINE 100 [IU]/ML
10 INJECTION, SOLUTION SUBCUTANEOUS EVERY MORNING
Qty: 0 | Refills: 0 | Status: DISCONTINUED | OUTPATIENT
Start: 2018-01-17 | End: 2018-01-17

## 2018-01-17 RX ORDER — INFLUENZA VIRUS VACCINE 15; 15; 15; 15 UG/.5ML; UG/.5ML; UG/.5ML; UG/.5ML
0.5 SUSPENSION INTRAMUSCULAR ONCE
Qty: 0 | Refills: 0 | Status: DISCONTINUED | OUTPATIENT
Start: 2018-01-17 | End: 2018-01-17

## 2018-01-17 RX ORDER — APIXABAN 2.5 MG/1
1 TABLET, FILM COATED ORAL
Qty: 60 | Refills: 0
Start: 2018-01-17 | End: 2018-02-15

## 2018-01-17 RX ORDER — FOLIC ACID 0.8 MG
1 TABLET ORAL DAILY
Qty: 0 | Refills: 0 | Status: DISCONTINUED | OUTPATIENT
Start: 2018-01-17 | End: 2018-01-17

## 2018-01-17 RX ORDER — MIDODRINE HYDROCHLORIDE 2.5 MG/1
1 TABLET ORAL
Qty: 0 | Refills: 0 | COMMUNITY
Start: 2018-01-17

## 2018-01-17 RX ORDER — DULOXETINE HYDROCHLORIDE 30 MG/1
60 CAPSULE, DELAYED RELEASE ORAL DAILY
Qty: 0 | Refills: 0 | Status: DISCONTINUED | OUTPATIENT
Start: 2018-01-17 | End: 2018-01-17

## 2018-01-17 RX ORDER — APIXABAN 2.5 MG/1
1 TABLET, FILM COATED ORAL
Qty: 0 | Refills: 0 | COMMUNITY
Start: 2018-01-17

## 2018-01-17 RX ORDER — LEVETIRACETAM 250 MG/1
500 TABLET, FILM COATED ORAL EVERY 12 HOURS
Qty: 0 | Refills: 0 | Status: DISCONTINUED | OUTPATIENT
Start: 2018-01-17 | End: 2018-01-17

## 2018-01-17 RX ORDER — RISPERIDONE 4 MG/1
1 TABLET ORAL EVERY 24 HOURS
Qty: 0 | Refills: 0 | Status: DISCONTINUED | OUTPATIENT
Start: 2018-01-17 | End: 2018-01-17

## 2018-01-17 RX ADMIN — RISPERIDONE 1 MILLIGRAM(S): 4 TABLET ORAL at 11:37

## 2018-01-17 RX ADMIN — LEVETIRACETAM 500 MILLIGRAM(S): 250 TABLET, FILM COATED ORAL at 17:29

## 2018-01-17 RX ADMIN — Medication 1 MILLIGRAM(S): at 11:37

## 2018-01-17 RX ADMIN — MIDODRINE HYDROCHLORIDE 5 MILLIGRAM(S): 2.5 TABLET ORAL at 13:35

## 2018-01-17 RX ADMIN — APIXABAN 5 MILLIGRAM(S): 2.5 TABLET, FILM COATED ORAL at 17:29

## 2018-01-17 RX ADMIN — SODIUM CHLORIDE 100 MILLILITER(S): 9 INJECTION INTRAMUSCULAR; INTRAVENOUS; SUBCUTANEOUS at 09:59

## 2018-01-17 RX ADMIN — SODIUM CHLORIDE 125 MILLILITER(S): 9 INJECTION INTRAMUSCULAR; INTRAVENOUS; SUBCUTANEOUS at 01:04

## 2018-01-17 RX ADMIN — MIDODRINE HYDROCHLORIDE 5 MILLIGRAM(S): 2.5 TABLET ORAL at 05:56

## 2018-01-17 RX ADMIN — APIXABAN 5 MILLIGRAM(S): 2.5 TABLET, FILM COATED ORAL at 13:34

## 2018-01-17 RX ADMIN — Medication 2: at 12:41

## 2018-01-17 RX ADMIN — INSULIN GLARGINE 10 UNIT(S): 100 INJECTION, SOLUTION SUBCUTANEOUS at 09:59

## 2018-01-17 RX ADMIN — SODIUM CHLORIDE 100 MILLILITER(S): 9 INJECTION INTRAMUSCULAR; INTRAVENOUS; SUBCUTANEOUS at 05:55

## 2018-01-17 RX ADMIN — RISPERIDONE 1 MILLIGRAM(S): 4 TABLET ORAL at 05:56

## 2018-01-17 RX ADMIN — LEVETIRACETAM 500 MILLIGRAM(S): 250 TABLET, FILM COATED ORAL at 05:55

## 2018-01-17 NOTE — PATIENT PROFILE ADULT. - HEALTH/HEALTHCARE ANXIETIES, PROFILE
I will STOP taking the medications listed below when I get home from the hospital:    Cipro 500 mg oral tablet  -- 1 tab(s) by mouth every 12 hours MDD:1000 mg  -- Avoid prolonged or excessive exposure to direct and/or artificial sunlight while taking this medication.  Check with your doctor before becoming pregnant.  Do not take dairy products, antacids, or iron preparations within one hour of this medication.  Finish all this medication unless otherwise directed by prescriber.  Medication should be taken with plenty of water.
none

## 2018-01-17 NOTE — DISCHARGE NOTE ADULT - MEDICATION SUMMARY - MEDICATIONS TO CHANGE
I will SWITCH the dose or number of times a day I take the medications listed below when I get home from the hospital:    Eliquis 5 mg oral tablet  -- 1 tab(s) by mouth once a day    midodrine 2.5 mg oral tablet  -- 1 tab(s) by mouth 2 times a day

## 2018-01-17 NOTE — DISCHARGE NOTE ADULT - HOSPITAL COURSE
67M PMH seizure disorder, orthostatic hypotension, afib on Eliquis, wheelchair bound secondary to neuropathy, DM2 , presenting with hypotension from outpatient office visit. Patient was going to a disability physical evaluation by Dr. Dan (not his usual PMD), and was complaining of weakness/dizziness. His BP was initially 80s/50s and when EMS arrived, decreased to 60s/40s. Of note, as per his wife this is an ongoing issue, and he was started on midodrine on Dec 12th, 2017. His wife has a log of his BPs at home, usually runs SBP 90s-100, with some readings SBP 70s-80s. She usually takes his pressure lying down, but he does feel dizzy/lightheaded when he sits up. He mainly is bed bound and gets around in a wheel chair. His last seizure was over 2 months ago and he is compliant with Keppra.  Of note, wife states she aggressively brushed his teeth this morning because he was going to the doctor and has poor dentition 2/2 not going to the dentist since 2010. Also, his wife stopped giving him Cymbalta 2 weeks ago because she was reading the information packet and saw that it can decrease seizure threshold. 67M PMH seizure disorder, orthostatic hypotension, afib on Eliquis, wheelchair bound secondary to neuropathy, DM2 , presenting with hypotension from outpatient office visit. Patient was going to a disability physical evaluation by Dr. Dan (not his usual PMD), and was complaining of weakness/dizziness. His BP was initially 80s/50s and when EMS arrived, decreased to 60s/40s. Of note, as per his wife this is an ongoing issue, and he was started on midodrine 2.5mg BID on Dec 12th, 2017. His wife has a log of his BPs at home, usually runs SBP 90s-100, with some readings SBP 70s-80s. She usually takes his pressure lying down, but he does feel dizzy/lightheaded when he sits up; pt states he feels this at least once a month. He mainly is bed bound and gets around in a wheel chair. His last seizure was over 2 months ago and he is compliant with Keppra.  Of note, wife states she aggressively brushed his teeth this morning because he was going to the doctor and has poor dentition 2/2 not going to the dentist since 2010. Also, his wife stopped giving him Cymbalta 2 weeks ago because she was reading the information packet and saw that it can decrease seizure threshold. While inpatient, pt was given 2L NS with improvement in pressures. Eliquis was continued at 5mg BID as CHADSVASC was 2 with gingival bleeding treated with transexamic acid. Midodrine was changed to 5mg TID with SBPs >100s. Orthostatics was repeated and was negative. Pt to follow up with Dr. Garcia (PMD) outpatient to further work up etiology of orthostatic hypotension.

## 2018-01-17 NOTE — DISCHARGE NOTE ADULT - CARE PROVIDER_API CALL
Danny Garcia), Family Medicine  [address]  [place], [place] [address]  Phone: [phone]  Fax: [phone]

## 2018-01-17 NOTE — PROGRESS NOTE ADULT - PROBLEM SELECTOR PLAN 6
Patient in sinus, rate controlled. Not on any rate control medication as an outpatient.   - Eliquis 5mg BID

## 2018-01-17 NOTE — DISCHARGE NOTE ADULT - MEDICATION SUMMARY - MEDICATIONS TO STOP TAKING
I will STOP taking the medications listed below when I get home from the hospital:  None I will STOP taking the medications listed below when I get home from the hospital:    DULoxetine 60 mg oral delayed release capsule  -- 1 cap(s) by mouth once a day

## 2018-01-17 NOTE — PROGRESS NOTE ADULT - PROBLEM SELECTOR PLAN 4
-Hold Metformin while inpatient  -As per patient's wife, she give him Lantus in the morning, but only if his glucose if >150. He did receive his dose this morning.  -A1c 5.6  -placed on 10U Lantus this AM  -can f/u outpatient for dose adjustment

## 2018-01-17 NOTE — PROGRESS NOTE ADULT - SUBJECTIVE AND OBJECTIVE BOX
OVERNIGHT EVENTS/SUBJECTIVE/ROS:    VITAL SIGNS:  Vital Signs Last 24 Hrs  T(C): 36.2 (2018 09:27), Max: 36.7 (2018 17:44)  T(F): 97.2 (2018 09:27), Max: 98.1 (2018 17:44)  HR: 79 (2018 09:27) (71 - 110)  BP: 101/68 (2018 09:27) (101/58 - 138/71)  BP(mean): --  RR: 16 (2018 09:27) (16 - 20)  SpO2: 95% (2018 09:27) (93% - 100%)    PHYSICAL EXAM:    General:   HEENT:   Neck:   Cardiovascular:   Respiratory:   Gastrointestinal:  Extremities:   Vascular:   Neurological:     MEDICATIONS:  MEDICATIONS  (STANDING):  apixaban 5 milliGRAM(s) Oral every 12 hours  atorvastatin 20 milliGRAM(s) Oral at bedtime  dextrose 5%. 1000 milliLiter(s) (50 mL/Hr) IV Continuous <Continuous>  dextrose 50% Injectable 12.5 Gram(s) IV Push once  dextrose 50% Injectable 25 Gram(s) IV Push once  dextrose 50% Injectable 25 Gram(s) IV Push once  folic acid 1 milliGRAM(s) Oral daily  influenza  Vaccine (HIGH DOSE) 0.5 milliLiter(s) IntraMuscular once  insulin glargine Injectable (LANTUS) 10 Unit(s) SubCutaneous every morning  insulin lispro (HumaLOG) corrective regimen sliding scale   SubCutaneous Before meals and at bedtime  levETIRAcetam 500 milliGRAM(s) Oral every 12 hours  midodrine 5 milliGRAM(s) Oral three times a day  mirtazapine 15 milliGRAM(s) Oral at bedtime  risperiDONE   Tablet 1 milliGRAM(s) Oral daily  risperiDONE   Tablet 2 milliGRAM(s) Oral at bedtime  risperiDONE   Tablet 1 milliGRAM(s) Oral every 24 hours  sodium chloride 0.9%. 1000 milliLiter(s) (100 mL/Hr) IV Continuous <Continuous>    MEDICATIONS  (PRN):  dextrose Gel 1 Dose(s) Oral once PRN Blood Glucose LESS THAN 70 milliGRAM(s)/deciliter  glucagon  Injectable 1 milliGRAM(s) IntraMuscular once PRN Glucose LESS THAN 70 milligrams/deciliter      ALLERGIES:  Allergies    Allergy Status Unknown    Intolerances    codeine (Other)      LABS:                        9.8    9.2   )-----------( 312      ( 2018 06:54 )             29.5         140  |  105  |  16  ----------------------------<  122<H>  4.0   |  22  |  0.64    Ca    8.6      2018 06:55    TPro  7.0  /  Alb  3.6  /  TBili  0.4  /  DBili  x   /  AST  11  /  ALT  6<L>  /  AlkPhos  65  -16    PT/INR - ( 2018 06:54 )   PT: 17.2 sec;   INR: 1.54          PTT - ( 2018 06:54 )  PTT:32.4 sec  Urinalysis Basic - ( 2018 00:30 )    Color: Yellow / Appearance: Clear / S.015 / pH: x  Gluc: x / Ketone: NEGATIVE  / Bili: Negative / Urobili: 0.2 E.U./dL   Blood: x / Protein: NEGATIVE mg/dL / Nitrite: NEGATIVE   Leuk Esterase: Small / RBC: < 5 /HPF / WBC > 10 /HPF   Sq Epi: x / Non Sq Epi: 0-5 /HPF / Bacteria: Present /HPF      CAPILLARY BLOOD GLUCOSE      POCT Blood Glucose.: 173 mg/dL (2018 11:53)      RADIOLOGY & ADDITIONAL TESTS: Reviewed. OVERNIGHT EVENTS/SUBJECTIVE/ROS: Pt no longer c/o dizziness/lightheadedness    VITAL SIGNS:  Vital Signs Last 24 Hrs  T(C): 36.2 (2018 09:27), Max: 36.7 (2018 17:44)  T(F): 97.2 (2018 09:27), Max: 98.1 (2018 17:44)  HR: 79 (2018 09:27) (71 - 110)  BP: 101/68 (2018 09:27) (101/58 - 138/71)  BP(mean): --  RR: 16 (2018 09:27) (16 - 20)  SpO2: 95% (2018 09:27) (93% - 100%)    PHYSICAL EXAM:    General:   HEENT:   Neck:   Cardiovascular:   Respiratory:   Gastrointestinal:  Extremities:   Vascular:   Neurological:     MEDICATIONS:  MEDICATIONS  (STANDING):  apixaban 5 milliGRAM(s) Oral every 12 hours  atorvastatin 20 milliGRAM(s) Oral at bedtime  dextrose 5%. 1000 milliLiter(s) (50 mL/Hr) IV Continuous <Continuous>  dextrose 50% Injectable 12.5 Gram(s) IV Push once  dextrose 50% Injectable 25 Gram(s) IV Push once  dextrose 50% Injectable 25 Gram(s) IV Push once  folic acid 1 milliGRAM(s) Oral daily  influenza  Vaccine (HIGH DOSE) 0.5 milliLiter(s) IntraMuscular once  insulin glargine Injectable (LANTUS) 10 Unit(s) SubCutaneous every morning  insulin lispro (HumaLOG) corrective regimen sliding scale   SubCutaneous Before meals and at bedtime  levETIRAcetam 500 milliGRAM(s) Oral every 12 hours  midodrine 5 milliGRAM(s) Oral three times a day  mirtazapine 15 milliGRAM(s) Oral at bedtime  risperiDONE   Tablet 1 milliGRAM(s) Oral daily  risperiDONE   Tablet 2 milliGRAM(s) Oral at bedtime  risperiDONE   Tablet 1 milliGRAM(s) Oral every 24 hours  sodium chloride 0.9%. 1000 milliLiter(s) (100 mL/Hr) IV Continuous <Continuous>    MEDICATIONS  (PRN):  dextrose Gel 1 Dose(s) Oral once PRN Blood Glucose LESS THAN 70 milliGRAM(s)/deciliter  glucagon  Injectable 1 milliGRAM(s) IntraMuscular once PRN Glucose LESS THAN 70 milligrams/deciliter      ALLERGIES:  Allergies    Allergy Status Unknown    Intolerances    codeine (Other)      LABS:                        9.8    9.2   )-----------( 312      ( 2018 06:54 )             29.5         140  |  105  |  16  ----------------------------<  122<H>  4.0   |  22  |  0.64    Ca    8.6      2018 06:55    TPro  7.0  /  Alb  3.6  /  TBili  0.4  /  DBili  x   /  AST  11  /  ALT  6<L>  /  AlkPhos  65  01-16    PT/INR - ( 2018 06:54 )   PT: 17.2 sec;   INR: 1.54          PTT - ( 2018 06:54 )  PTT:32.4 sec  Urinalysis Basic - ( 2018 00:30 )    Color: Yellow / Appearance: Clear / S.015 / pH: x  Gluc: x / Ketone: NEGATIVE  / Bili: Negative / Urobili: 0.2 E.U./dL   Blood: x / Protein: NEGATIVE mg/dL / Nitrite: NEGATIVE   Leuk Esterase: Small / RBC: < 5 /HPF / WBC > 10 /HPF   Sq Epi: x / Non Sq Epi: 0-5 /HPF / Bacteria: Present /HPF      CAPILLARY BLOOD GLUCOSE      POCT Blood Glucose.: 173 mg/dL (2018 11:53)      RADIOLOGY & ADDITIONAL TESTS: Reviewed. OVERNIGHT EVENTS/SUBJECTIVE/ROS: Pt no longer c/o dizziness/lightheadedness    VITAL SIGNS:  Vital Signs Last 24 Hrs  T(C): 36.2 (2018 09:27), Max: 36.7 (2018 17:44)  T(F): 97.2 (2018 09:27), Max: 98.1 (2018 17:44)  HR: 79 (2018 09:27) (71 - 110)  BP: 101/68 (2018 09:27) (101/58 - 138/71)  BP(mean): --  RR: 16 (2018 09:27) (16 - 20)  SpO2: 95% (2018 09:27) (93% - 100%)    PHYSICAL EXAM:    General: lying in bed, in NAD  HEENT: EOMI anicteric  Neck: supple  Cardiovascular: S1, S2, irregular rhythm   Respiratory: CTABL, no w/r/r  Gastrointestinal: soft NTND abdomen, +BS  Extremities: no peripheral edema, WWP  Vascular: 2+ bilateral radial DP/PT pulses  Neurological: alert awake oriented    MEDICATIONS:  MEDICATIONS  (STANDING):  apixaban 5 milliGRAM(s) Oral every 12 hours  atorvastatin 20 milliGRAM(s) Oral at bedtime  dextrose 5%. 1000 milliLiter(s) (50 mL/Hr) IV Continuous <Continuous>  dextrose 50% Injectable 12.5 Gram(s) IV Push once  dextrose 50% Injectable 25 Gram(s) IV Push once  dextrose 50% Injectable 25 Gram(s) IV Push once  folic acid 1 milliGRAM(s) Oral daily  influenza  Vaccine (HIGH DOSE) 0.5 milliLiter(s) IntraMuscular once  insulin glargine Injectable (LANTUS) 10 Unit(s) SubCutaneous every morning  insulin lispro (HumaLOG) corrective regimen sliding scale   SubCutaneous Before meals and at bedtime  levETIRAcetam 500 milliGRAM(s) Oral every 12 hours  midodrine 5 milliGRAM(s) Oral three times a day  mirtazapine 15 milliGRAM(s) Oral at bedtime  risperiDONE   Tablet 1 milliGRAM(s) Oral daily  risperiDONE   Tablet 2 milliGRAM(s) Oral at bedtime  risperiDONE   Tablet 1 milliGRAM(s) Oral every 24 hours  sodium chloride 0.9%. 1000 milliLiter(s) (100 mL/Hr) IV Continuous <Continuous>    MEDICATIONS  (PRN):  dextrose Gel 1 Dose(s) Oral once PRN Blood Glucose LESS THAN 70 milliGRAM(s)/deciliter  glucagon  Injectable 1 milliGRAM(s) IntraMuscular once PRN Glucose LESS THAN 70 milligrams/deciliter      ALLERGIES:  Allergies    Allergy Status Unknown    Intolerances    codeine (Other)      LABS:                        9.8    9.2   )-----------( 312      ( 2018 06:54 )             29.5         140  |  105  |  16  ----------------------------<  122<H>  4.0   |  22  |  0.64    Ca    8.6      2018 06:55    TPro  7.0  /  Alb  3.6  /  TBili  0.4  /  DBili  x   /  AST  11  /  ALT  6<L>  /  AlkPhos  65  01-16    PT/INR - ( 2018 06:54 )   PT: 17.2 sec;   INR: 1.54          PTT - ( 2018 06:54 )  PTT:32.4 sec  Urinalysis Basic - ( 2018 00:30 )    Color: Yellow / Appearance: Clear / S.015 / pH: x  Gluc: x / Ketone: NEGATIVE  / Bili: Negative / Urobili: 0.2 E.U./dL   Blood: x / Protein: NEGATIVE mg/dL / Nitrite: NEGATIVE   Leuk Esterase: Small / RBC: < 5 /HPF / WBC > 10 /HPF   Sq Epi: x / Non Sq Epi: 0-5 /HPF / Bacteria: Present /HPF      CAPILLARY BLOOD GLUCOSE      POCT Blood Glucose.: 173 mg/dL (2018 11:53)      RADIOLOGY & ADDITIONAL TESTS: Reviewed.

## 2018-01-17 NOTE — PROGRESS NOTE ADULT - PROBLEM SELECTOR PLAN 5
As per patient's wife, patient is bedbound secondary to diabetic neuropathy  He was previously taking Cymbalta, but his wife stopped it 2 weeks ago because she was concerned it could lower his seizure threshold after reading the side effects.   As per ICU consult, Cymbalta can increase anticoagulation effects of NOACs.  Will continue to hold given coagulopathy on Eliquis

## 2018-01-17 NOTE — PROGRESS NOTE ADULT - PROBLEM SELECTOR PLAN 1
Patient with known history of orthostatic hypotension, recently started on low dose of midodrine (Dec 12, 2017), presenting with orthostatic hypotension episode (symptomatic upon sitting up, hypotensive to SBP 60s that responded to fluids, s/p 3LNS)   -placed on midodrine 5mg TID  -Monitor BP, improved  -orthostatics negative

## 2018-01-17 NOTE — PROGRESS NOTE ADULT - PROBLEM SELECTOR PLAN 3
Patient with baseline Hgb ~8-9, normocytic. Stable  Unclear etiology, will check iron studies, reticulocyte count, haptoglobin, LDH  - iron studies with normal ferritin indicative of ACD

## 2018-01-17 NOTE — DISCHARGE NOTE ADULT - PLAN OF CARE
midodrine 5mg three times a day You have a history of orthostatic hypotension and was admitted to the hospital due to hypotension seen during your outpatient visit with BPs to the 60/40s. You were given 2 liters of normal saline bolus with good blood pressure response. We changed your midodrine dose to 5mg three times a day - this is a blood pressure supporting medication that helps increase your pressures. Please follow up with Dr. Garcia to further work up your orthostatic hypotension. If you experience dizziness and lightheadedness, please alert Dr. Garcia or return to the hospital. Eliquis 5mg twice a day You were being treated for atrial fibrillation with Eliquis 5mg once a day which we have changed to Eliquis 5mg twice a day as you have a risk of clot formation and stroke with your heart condition of atrial fibrillation. Please continue with Eliquis 5mg twice a day and alert your primary care physician if you experience bleeding. Eliquis is an anticoagulant, which thins your blood and increases your risk of bleeding. If bleeding fails to cease despite application of pressure to the site, please discontinue Eliquis and alert your doctor or return to the hospital.

## 2018-01-17 NOTE — DISCHARGE NOTE ADULT - CARE PLAN
Principal Discharge DX:	Orthostatic hypotension  Goal:	midodrine 5mg three times a day  Assessment and plan of treatment:	You have a history of orthostatic hypotension and was admitted to the hospital due to hypotension seen during your outpatient visit with BPs to the 60/40s. You were given 2 liters of normal saline bolus with good blood pressure response. We changed your midodrine dose to 5mg three times a day - this is a blood pressure supporting medication that helps increase your pressures. Please follow up with Dr. Garcia to further work up your orthostatic hypotension. If you experience dizziness and lightheadedness, please alert Dr. Garcia or return to the hospital.  Secondary Diagnosis:	Atrial fibrillation  Goal:	Eliquis 5mg twice a day  Assessment and plan of treatment:	You were being treated for atrial fibrillation with Eliquis 5mg once a day which we have changed to Eliquis 5mg twice a day as you have a risk of clot formation and stroke with your heart condition of atrial fibrillation. Please continue with Eliquis 5mg twice a day and alert your primary care physician if you experience bleeding. Eliquis is an anticoagulant, which thins your blood and increases your risk of bleeding. If bleeding fails to cease despite application of pressure to the site, please discontinue Eliquis and alert your doctor or return to the hospital.  Secondary Diagnosis:	Type 2 diabetes mellitus

## 2018-01-17 NOTE — PROGRESS NOTE ADULT - PROBLEM SELECTOR PLAN 2
Elevated INR and PTT, possibly secondary to eliquis, along with possible interaction of eliquis and Duloxetine (although has been held by his wife for 2 weeks.) Pt came in with gingival bleeding s/p vigorous teeth-brushing, now resolved s/p transexamic acid.  -placed on Eliquis 5mg BID as pt's CHADSVASC is 2 with no signs of active bleeding at this time

## 2018-01-17 NOTE — DISCHARGE NOTE ADULT - MEDICATION SUMMARY - MEDICATIONS TO TAKE
I will START or STAY ON the medications listed below when I get home from the hospital:    apixaban 5 mg oral tablet  -- 1 tab(s) by mouth every 12 hours  -- Indication: For Afib    Keppra 500 mg oral tablet  -- 1 tab(s) by mouth 2 times a day  -- Indication: For seizure    mirtazapine 15 mg oral tablet  -- 1 tab(s) by mouth once a day (at bedtime)  -- Indication: For Depression    DULoxetine 60 mg oral delayed release capsule  -- 1 cap(s) by mouth once a day  -- Indication: For Depression    HumaLOG KwikPen 100 units/mL subcutaneous solution  --  subcutaneous Before Meals and at Bedtime Correctional Scale   -- Indication: For Diabetes    metFORMIN 850 mg oral tablet  -- 1 tab(s) by mouth 2 times a day  -- Indication: For Diabetes    Lantus Solostar Pen 100 units/mL subcutaneous solution  -- 20 unit(s) subcutaneous once a day in the morning  -- Indication: For Diabetes    atorvastatin 20 mg oral tablet  -- 1 tab(s) by mouth once a day (at bedtime)  -- Indication: For Hyperlipidemia    risperiDONE 1 mg oral tablet  -- 2 cap(s) by mouth once a day (at bedtime)  -- Indication: For Depression    risperiDONE 1 mg oral tablet  -- 1 cap(s) by mouth once a day (in the morning)  -- Indication: For Depression    risperiDONE 1 mg oral tablet  -- 1 cap(s) by mouth once a day in the afternoon  -- Indication: For Depression    midodrine 5 mg oral tablet  -- 1 tab(s) by mouth 3 times a day  -- Indication: For Hypotension    folic acid 1 mg oral tablet  -- 1 tab(s) by mouth once a day  -- Indication: For Nutrition, metabolism, and development symptoms I will START or STAY ON the medications listed below when I get home from the hospital:    apixaban 5 mg oral tablet  -- 1 tab(s) by mouth every 12 hours  -- Indication: For Afib    Keppra 500 mg oral tablet  -- 1 tab(s) by mouth 2 times a day  -- Indication: For seizure    mirtazapine 15 mg oral tablet  -- 1 tab(s) by mouth once a day (at bedtime)  -- Indication: For Depression    Lantus Solostar Pen 100 units/mL subcutaneous solution  -- 20 unit(s) subcutaneous once a day in the morning  -- Indication: For Diabetes    HumaLOG KwikPen 100 units/mL subcutaneous solution  --  subcutaneous Before Meals and at Bedtime Correctional Scale   -- Indication: For Diabetes    metFORMIN 850 mg oral tablet  -- 1 tab(s) by mouth 2 times a day  -- Indication: For Diabetes    atorvastatin 20 mg oral tablet  -- 1 tab(s) by mouth once a day (at bedtime)  -- Indication: For Hyperlipidemia    risperiDONE 1 mg oral tablet  -- 2 cap(s) by mouth once a day (at bedtime)  -- Indication: For Depression    risperiDONE 1 mg oral tablet  -- 1 cap(s) by mouth once a day (in the morning)  -- Indication: For Depression    risperiDONE 1 mg oral tablet  -- 1 cap(s) by mouth once a day in the afternoon  -- Indication: For Depression    midodrine 5 mg oral tablet  -- 1 tab(s) by mouth 3 times a day  -- Indication: For Hypotension    folic acid 1 mg oral tablet  -- 1 tab(s) by mouth once a day  -- Indication: For Nutrition, metabolism, and development symptoms

## 2018-01-17 NOTE — PROGRESS NOTE ADULT - PROBLEM SELECTOR PLAN 8
Unclear what patient's psychiatric diagnosis is, but will continue current psychiatric meds. Need collateral information in the AM.  -Continue Risperidone 1mg in the AM, mid day, and 2mg at bedtime  -Continue Mirtazapine 15mg at bedtime

## 2018-01-18 LAB
-  COAGULASE NEGATIVE STAPHYLOCOCCUS: SIGNIFICANT CHANGE UP
GRAM STN FLD: SIGNIFICANT CHANGE UP
GRAM STN FLD: SIGNIFICANT CHANGE UP
METHOD TYPE: SIGNIFICANT CHANGE UP

## 2018-01-19 DIAGNOSIS — Z88.6 ALLERGY STATUS TO ANALGESIC AGENT: ICD-10-CM

## 2018-01-19 DIAGNOSIS — K08.9 DISORDER OF TEETH AND SUPPORTING STRUCTURES, UNSPECIFIED: ICD-10-CM

## 2018-01-19 DIAGNOSIS — Z87.891 PERSONAL HISTORY OF NICOTINE DEPENDENCE: ICD-10-CM

## 2018-01-19 DIAGNOSIS — D72.829 ELEVATED WHITE BLOOD CELL COUNT, UNSPECIFIED: ICD-10-CM

## 2018-01-19 DIAGNOSIS — Z99.3 DEPENDENCE ON WHEELCHAIR: ICD-10-CM

## 2018-01-19 DIAGNOSIS — I48.91 UNSPECIFIED ATRIAL FIBRILLATION: ICD-10-CM

## 2018-01-19 DIAGNOSIS — D68.9 COAGULATION DEFECT, UNSPECIFIED: ICD-10-CM

## 2018-01-19 DIAGNOSIS — Z79.01 LONG TERM (CURRENT) USE OF ANTICOAGULANTS: ICD-10-CM

## 2018-01-19 DIAGNOSIS — F10.21 ALCOHOL DEPENDENCE, IN REMISSION: ICD-10-CM

## 2018-01-19 DIAGNOSIS — Z80.3 FAMILY HISTORY OF MALIGNANT NEOPLASM OF BREAST: ICD-10-CM

## 2018-01-19 DIAGNOSIS — F99 MENTAL DISORDER, NOT OTHERWISE SPECIFIED: ICD-10-CM

## 2018-01-19 DIAGNOSIS — G58.9 MONONEUROPATHY, UNSPECIFIED: ICD-10-CM

## 2018-01-19 DIAGNOSIS — D64.9 ANEMIA, UNSPECIFIED: ICD-10-CM

## 2018-01-19 DIAGNOSIS — Z79.84 LONG TERM (CURRENT) USE OF ORAL HYPOGLYCEMIC DRUGS: ICD-10-CM

## 2018-01-19 DIAGNOSIS — D33.2 BENIGN NEOPLASM OF BRAIN, UNSPECIFIED: ICD-10-CM

## 2018-01-19 DIAGNOSIS — I95.1 ORTHOSTATIC HYPOTENSION: ICD-10-CM

## 2018-01-19 DIAGNOSIS — E11.41 TYPE 2 DIABETES MELLITUS WITH DIABETIC MONONEUROPATHY: ICD-10-CM

## 2018-01-19 DIAGNOSIS — E78.5 HYPERLIPIDEMIA, UNSPECIFIED: ICD-10-CM

## 2018-01-19 DIAGNOSIS — G40.909 EPILEPSY, UNSPECIFIED, NOT INTRACTABLE, WITHOUT STATUS EPILEPTICUS: ICD-10-CM

## 2018-01-19 DIAGNOSIS — K06.8 OTHER SPECIFIED DISORDERS OF GINGIVA AND EDENTULOUS ALVEOLAR RIDGE: ICD-10-CM

## 2018-01-20 LAB
-  CEFAZOLIN: SIGNIFICANT CHANGE UP
-  CEFAZOLIN: SIGNIFICANT CHANGE UP
-  CLINDAMYCIN: SIGNIFICANT CHANGE UP
-  ERYTHROMYCIN: SIGNIFICANT CHANGE UP
-  LINEZOLID: SIGNIFICANT CHANGE UP
-  LINEZOLID: SIGNIFICANT CHANGE UP
-  OXACILLIN: SIGNIFICANT CHANGE UP
-  OXACILLIN: SIGNIFICANT CHANGE UP
-  PENICILLIN: SIGNIFICANT CHANGE UP
-  RIFAMPIN: SIGNIFICANT CHANGE UP
-  RIFAMPIN: SIGNIFICANT CHANGE UP
-  TRIMETHOPRIM/SULFAMETHOXAZOLE: SIGNIFICANT CHANGE UP
-  TRIMETHOPRIM/SULFAMETHOXAZOLE: SIGNIFICANT CHANGE UP
-  VANCOMYCIN: SIGNIFICANT CHANGE UP
-  VANCOMYCIN: SIGNIFICANT CHANGE UP
CULTURE RESULTS: SIGNIFICANT CHANGE UP
METHOD TYPE: SIGNIFICANT CHANGE UP
METHOD TYPE: SIGNIFICANT CHANGE UP
ORGANISM # SPEC MICROSCOPIC CNT: SIGNIFICANT CHANGE UP
ORGANISM # SPEC MICROSCOPIC CNT: SIGNIFICANT CHANGE UP
SPECIMEN SOURCE: SIGNIFICANT CHANGE UP

## 2018-01-21 LAB
-  CEFAZOLIN: SIGNIFICANT CHANGE UP
-  CLINDAMYCIN: SIGNIFICANT CHANGE UP
-  ERYTHROMYCIN: SIGNIFICANT CHANGE UP
-  LINEZOLID: SIGNIFICANT CHANGE UP
-  OXACILLIN: SIGNIFICANT CHANGE UP
-  PENICILLIN: SIGNIFICANT CHANGE UP
-  RIFAMPIN: SIGNIFICANT CHANGE UP
-  TRIMETHOPRIM/SULFAMETHOXAZOLE: SIGNIFICANT CHANGE UP
-  VANCOMYCIN: SIGNIFICANT CHANGE UP
METHOD TYPE: SIGNIFICANT CHANGE UP

## 2018-01-23 LAB
CULTURE RESULTS: SIGNIFICANT CHANGE UP
CULTURE RESULTS: SIGNIFICANT CHANGE UP
ORGANISM # SPEC MICROSCOPIC CNT: SIGNIFICANT CHANGE UP
SPECIMEN SOURCE: SIGNIFICANT CHANGE UP
SPECIMEN SOURCE: SIGNIFICANT CHANGE UP

## 2019-12-04 ENCOUNTER — TRANSCRIPTION ENCOUNTER (OUTPATIENT)
Age: 69
End: 2019-12-04

## 2020-02-17 ENCOUNTER — INPATIENT (INPATIENT)
Facility: HOSPITAL | Age: 70
LOS: 1 days | Discharge: HOME | DRG: 178 | End: 2020-02-19
Attending: INTERNAL MEDICINE | Admitting: INTERNAL MEDICINE
Payer: MEDICARE

## 2020-02-17 VITALS
HEIGHT: 67 IN | OXYGEN SATURATION: 94 % | WEIGHT: 149.91 LBS | HEART RATE: 84 BPM | RESPIRATION RATE: 16 BRPM | TEMPERATURE: 98 F | SYSTOLIC BLOOD PRESSURE: 103 MMHG | DIASTOLIC BLOOD PRESSURE: 63 MMHG

## 2020-02-17 DIAGNOSIS — Z96.7 PRESENCE OF OTHER BONE AND TENDON IMPLANTS: Chronic | ICD-10-CM

## 2020-02-17 LAB
ALBUMIN SERPL ELPH-MCNC: 3 G/DL — LOW (ref 3.3–5)
ALBUMIN SERPL ELPH-MCNC: 3.4 G/DL — SIGNIFICANT CHANGE UP (ref 3.3–5)
ALP SERPL-CCNC: 78 U/L — SIGNIFICANT CHANGE UP (ref 40–120)
ALP SERPL-CCNC: 81 U/L — SIGNIFICANT CHANGE UP (ref 40–120)
ALT FLD-CCNC: 11 U/L — SIGNIFICANT CHANGE UP (ref 10–45)
ALT FLD-CCNC: SIGNIFICANT CHANGE UP (ref 10–45)
ANION GAP SERPL CALC-SCNC: 11 MMOL/L — SIGNIFICANT CHANGE UP (ref 5–17)
ANION GAP SERPL CALC-SCNC: 12 MMOL/L — SIGNIFICANT CHANGE UP (ref 5–17)
APPEARANCE UR: CLEAR — SIGNIFICANT CHANGE UP
APTT BLD: 27.4 SEC — LOW (ref 27.5–36.3)
AST SERPL-CCNC: 16 U/L — SIGNIFICANT CHANGE UP (ref 10–40)
AST SERPL-CCNC: SIGNIFICANT CHANGE UP (ref 10–40)
BASOPHILS # BLD AUTO: 0 K/UL — SIGNIFICANT CHANGE UP (ref 0–0.2)
BASOPHILS NFR BLD AUTO: 0 % — SIGNIFICANT CHANGE UP (ref 0–2)
BILIRUB SERPL-MCNC: 0.5 MG/DL — SIGNIFICANT CHANGE UP (ref 0.2–1.2)
BILIRUB SERPL-MCNC: 0.5 MG/DL — SIGNIFICANT CHANGE UP (ref 0.2–1.2)
BILIRUB UR-MCNC: NEGATIVE — SIGNIFICANT CHANGE UP
BUN SERPL-MCNC: 46 MG/DL — HIGH (ref 7–23)
BUN SERPL-MCNC: 47 MG/DL — HIGH (ref 7–23)
CALCIUM SERPL-MCNC: 10.1 MG/DL — SIGNIFICANT CHANGE UP (ref 8.4–10.5)
CALCIUM SERPL-MCNC: 10.7 MG/DL — HIGH (ref 8.4–10.5)
CHLORIDE SERPL-SCNC: 101 MMOL/L — SIGNIFICANT CHANGE UP (ref 96–108)
CHLORIDE SERPL-SCNC: 102 MMOL/L — SIGNIFICANT CHANGE UP (ref 96–108)
CO2 SERPL-SCNC: 24 MMOL/L — SIGNIFICANT CHANGE UP (ref 22–31)
CO2 SERPL-SCNC: 24 MMOL/L — SIGNIFICANT CHANGE UP (ref 22–31)
COLOR SPEC: YELLOW — SIGNIFICANT CHANGE UP
CREAT SERPL-MCNC: 1.13 MG/DL — SIGNIFICANT CHANGE UP (ref 0.5–1.3)
CREAT SERPL-MCNC: 1.18 MG/DL — SIGNIFICANT CHANGE UP (ref 0.5–1.3)
DIFF PNL FLD: NEGATIVE — SIGNIFICANT CHANGE UP
EOSINOPHIL # BLD AUTO: 0 K/UL — SIGNIFICANT CHANGE UP (ref 0–0.5)
EOSINOPHIL NFR BLD AUTO: 0 % — SIGNIFICANT CHANGE UP (ref 0–6)
FLU A RESULT: SIGNIFICANT CHANGE UP
FLU A RESULT: SIGNIFICANT CHANGE UP
FLUAV AG NPH QL: SIGNIFICANT CHANGE UP
FLUBV AG NPH QL: SIGNIFICANT CHANGE UP
GLUCOSE SERPL-MCNC: 172 MG/DL — HIGH (ref 70–99)
GLUCOSE SERPL-MCNC: 185 MG/DL — HIGH (ref 70–99)
GLUCOSE UR QL: NEGATIVE — SIGNIFICANT CHANGE UP
HCT VFR BLD CALC: 34.1 % — LOW (ref 39–50)
HGB BLD-MCNC: 11.1 G/DL — LOW (ref 13–17)
INR BLD: 1.57 — HIGH (ref 0.88–1.16)
KETONES UR-MCNC: ABNORMAL MG/DL
LACTATE SERPL-SCNC: 0.9 MMOL/L — SIGNIFICANT CHANGE UP (ref 0.5–2)
LACTATE SERPL-SCNC: 3.5 MMOL/L — HIGH (ref 0.5–2)
LEUKOCYTE ESTERASE UR-ACNC: NEGATIVE — SIGNIFICANT CHANGE UP
LYMPHOCYTES # BLD AUTO: 1.1 K/UL — SIGNIFICANT CHANGE UP (ref 1–3.3)
LYMPHOCYTES # BLD AUTO: 4.3 % — LOW (ref 13–44)
MAGNESIUM SERPL-MCNC: 1.7 MG/DL — SIGNIFICANT CHANGE UP (ref 1.6–2.6)
MCHC RBC-ENTMCNC: 30.6 PG — SIGNIFICANT CHANGE UP (ref 27–34)
MCHC RBC-ENTMCNC: 32.6 GM/DL — SIGNIFICANT CHANGE UP (ref 32–36)
MCV RBC AUTO: 93.9 FL — SIGNIFICANT CHANGE UP (ref 80–100)
MONOCYTES # BLD AUTO: 0.66 K/UL — SIGNIFICANT CHANGE UP (ref 0–0.9)
MONOCYTES NFR BLD AUTO: 2.6 % — SIGNIFICANT CHANGE UP (ref 2–14)
NEUTROPHILS # BLD AUTO: 23.74 K/UL — HIGH (ref 1.8–7.4)
NEUTROPHILS NFR BLD AUTO: 87 % — HIGH (ref 43–77)
NITRITE UR-MCNC: NEGATIVE — SIGNIFICANT CHANGE UP
NT-PROBNP SERPL-SCNC: 606 PG/ML — HIGH (ref 0–300)
PH UR: 5.5 — SIGNIFICANT CHANGE UP (ref 5–8)
PLATELET # BLD AUTO: 304 K/UL — SIGNIFICANT CHANGE UP (ref 150–400)
POTASSIUM SERPL-MCNC: 4.6 MMOL/L — SIGNIFICANT CHANGE UP (ref 3.5–5.3)
POTASSIUM SERPL-MCNC: SIGNIFICANT CHANGE UP (ref 3.5–5.3)
POTASSIUM SERPL-SCNC: 4.6 MMOL/L — SIGNIFICANT CHANGE UP (ref 3.5–5.3)
POTASSIUM SERPL-SCNC: SIGNIFICANT CHANGE UP (ref 3.5–5.3)
PROT SERPL-MCNC: 7.2 G/DL — SIGNIFICANT CHANGE UP (ref 6–8.3)
PROT SERPL-MCNC: 7.5 G/DL — SIGNIFICANT CHANGE UP (ref 6–8.3)
PROT UR-MCNC: 30 MG/DL
PROTHROM AB SERPL-ACNC: 18.2 SEC — HIGH (ref 10–12.9)
RBC # BLD: 3.63 M/UL — LOW (ref 4.2–5.8)
RBC # FLD: 13.9 % — SIGNIFICANT CHANGE UP (ref 10.3–14.5)
RSV RESULT: SIGNIFICANT CHANGE UP
RSV RNA RESP QL NAA+PROBE: SIGNIFICANT CHANGE UP
SODIUM SERPL-SCNC: 137 MMOL/L — SIGNIFICANT CHANGE UP (ref 135–145)
SODIUM SERPL-SCNC: 137 MMOL/L — SIGNIFICANT CHANGE UP (ref 135–145)
SP GR SPEC: >=1.03 — SIGNIFICANT CHANGE UP (ref 1–1.03)
TROPONIN T SERPL-MCNC: 0.03 NG/ML — HIGH (ref 0–0.01)
UROBILINOGEN FLD QL: 0.2 E.U./DL — SIGNIFICANT CHANGE UP
WBC # BLD: 25.5 K/UL — HIGH (ref 3.8–10.5)
WBC # FLD AUTO: 25.5 K/UL — HIGH (ref 3.8–10.5)

## 2020-02-17 PROCEDURE — 99223 1ST HOSP IP/OBS HIGH 75: CPT | Mod: GC

## 2020-02-17 PROCEDURE — 71045 X-RAY EXAM CHEST 1 VIEW: CPT | Mod: 26

## 2020-02-17 PROCEDURE — 99285 EMERGENCY DEPT VISIT HI MDM: CPT

## 2020-02-17 PROCEDURE — 93010 ELECTROCARDIOGRAM REPORT: CPT

## 2020-02-17 PROCEDURE — 70450 CT HEAD/BRAIN W/O DYE: CPT | Mod: 26

## 2020-02-17 RX ORDER — SODIUM CHLORIDE 9 MG/ML
1000 INJECTION INTRAMUSCULAR; INTRAVENOUS; SUBCUTANEOUS ONCE
Refills: 0 | Status: COMPLETED | OUTPATIENT
Start: 2020-02-17 | End: 2020-02-17

## 2020-02-17 RX ORDER — CEFTRIAXONE 500 MG/1
1000 INJECTION, POWDER, FOR SOLUTION INTRAMUSCULAR; INTRAVENOUS ONCE
Refills: 0 | Status: COMPLETED | OUTPATIENT
Start: 2020-02-17 | End: 2020-02-17

## 2020-02-17 RX ADMIN — SODIUM CHLORIDE 1000 MILLILITER(S): 9 INJECTION INTRAMUSCULAR; INTRAVENOUS; SUBCUTANEOUS at 19:48

## 2020-02-17 RX ADMIN — SODIUM CHLORIDE 1000 MILLILITER(S): 9 INJECTION INTRAMUSCULAR; INTRAVENOUS; SUBCUTANEOUS at 20:24

## 2020-02-17 RX ADMIN — SODIUM CHLORIDE 1000 MILLILITER(S): 9 INJECTION INTRAMUSCULAR; INTRAVENOUS; SUBCUTANEOUS at 22:25

## 2020-02-17 RX ADMIN — CEFTRIAXONE 1000 MILLIGRAM(S): 500 INJECTION, POWDER, FOR SOLUTION INTRAMUSCULAR; INTRAVENOUS at 20:36

## 2020-02-17 RX ADMIN — CEFTRIAXONE 100 MILLIGRAM(S): 500 INJECTION, POWDER, FOR SOLUTION INTRAMUSCULAR; INTRAVENOUS at 19:48

## 2020-02-17 NOTE — H&P ADULT - PROBLEM SELECTOR PLAN 10
1) PCP Contacted on Admission: (Y/N) --> Name & Phone #: Dr. Danny Garcia  2) Date of Contact with PCP:  3) PCP Contacted at Discharge: (Y/N, N/A)  4) Summary of Handoff Given to PCP:   5) Post-Discharge Appointment Date and Location:

## 2020-02-17 NOTE — H&P ADULT - PROBLEM SELECTOR PLAN 5
Mild trop elevation on admission, EKG noted NSR with pac, no ischemic changes, no active chest pain, no hx of CAD  -f/u repeat

## 2020-02-17 NOTE — H&P ADULT - NSICDXFAMILYHX_GEN_ALL_CORE_FT
FAMILY HISTORY:  Family history of diabetes mellitus    Father  Still living? Unknown  Family history of colon cancer, Age at diagnosis: Age Unknown

## 2020-02-17 NOTE — H&P ADULT - PROBLEM SELECTOR PLAN 1
HX of seizures, on keppra 500mg qD Does not have neurologist.   b/l upper extremity hand shaking with blank stare and post-ictal like episode today.   CTH negative  - VEEG ordered, f/u epilepsy tomorrow  - continue with home keppra dose  - Keppra level sent in ED    #Depression  On risperodone 3mg and mirtazapine 7.5mg HX of seizures, on keppra 500mg qD Does not have neurologist.   b/l upper extremity hand shaking with blank stare and post-ictal like episode today.   CTH negative  - VEEG ordered, f/u epilepsy tomorrow  - continue with home keppra dose  - Keppra level sent in ED    #Depression  On risperidone 3mg and mirtazapine 7.5mg

## 2020-02-17 NOTE — H&P ADULT - PROBLEM SELECTOR PLAN 7
HX of neuropathy, previously on gabapentin, but no longer. Wheelchair bound, unable to ambulate HX of neuropathy, previously on gabapentin, but no longer. Wheelchair bound, unable to ambulate  - c/w home folic acid, b12

## 2020-02-17 NOTE — H&P ADULT - NSHPPHYSICALEXAM_GEN_ALL_CORE
General: Lying in bed comfortably, in NAD  HEENT: Anicteric sclera, PERRL. EOMI. seborrheic dermatitis of face  Neck: No distended neck veins  Resp: Clear to auscultation bilaterally  Cardiac: S1, S2 appreciated, No murmurs, rubs or gallops. Regular Rate and Rhythm.  GI: Soft, nontender, nondistended  Neuro: AAOx3, following commands, 5/5 strength in upper and lower extremities  Extremities: No edema noted, DP intact.

## 2020-02-17 NOTE — ED ADULT NURSE REASSESSMENT NOTE - NS ED NURSE REASSESS COMMENT FT1
Patient had bowel movement, incontinent of bowels. Patient's stool normal in color and appearance: brown and formed. Patient's sheets and pads changed; patient cleaned with wipes. 2nd NS bolus started. Will continue to monitor.

## 2020-02-17 NOTE — ED PROVIDER NOTE - PHYSICAL EXAMINATION
CONST: chronically ill appearing elderly male NAD speaking in full sentences  HEAD: atraumatic  EYES: conjunctivae clear  ENT: mmm  NECK: supple/FROM  CARD: rrr no murmurs  CHEST: ctab no r/r/w, no stridor/retractions/tripoding  ABD: soft, nd, nttp, no rebound/guarding  BACK: no decub  EXT: FROM, symmetric distal pulses intact  SKIN: warm, dry, no rash, no pedal edema, cap refill <2sec  NEURO: a+ox3, no facial asymmetry, no aphasia/dysphasia, no dysarthria, PERRL, EOMI, no neglect, CN II-XII intact, ftn wnl, neg pronator, baseline 4/5 strength x4, gross sensation intact x4

## 2020-02-17 NOTE — H&P ADULT - PROBLEM SELECTOR PLAN 9
F: IVF  E: Replete electrolytes to maintain K>4 and Mg>2  N: Mechanical soft, carb consistent  Diet as tolerated, Speech and swallow evaluation

## 2020-02-17 NOTE — H&P ADULT - HISTORY OF PRESENT ILLNESS
69M PMH DM2, diabetic neuropathy for which wheelchair bound, orthostatic hypotension, syncope, afib (on eliquis) presenting with seizure like activity- blank stare with shaking in arms. As per wife, patient has had hx of seizures for several years and takes keppra. Previously on 500mg BID and decreased to 500 mg qD. Last seizure about 2 weeks ago, no neurologist/epileptologist. Patient has not been complaining of fevers, chills. + Cough, nonproductive. + dysphagia 69M PMH DM2, diabetic neuropathy for which wheelchair bound, orthostatic hypotension, syncope, afib (on eliquis) presenting with seizure like activity- blank stare with shaking in arms. As per wife, patient has had hx of seizures for several years and takes keppra. Previously on 500mg BID and decreased to 500 mg qD. Last seizure about 2 weeks ago, no neurologist/epileptologist. Patient has not been complaining of fevers, chills. + Cough, nonproductive. + dysphagia, denies abdominal pain, denies chest pain/shortness of breath. Denies dysuria, + incontinence for which uses condom catheter at home.     AT [facility], VS /63, HR 84, RR 16, T 98.6F, O2 sat 94%, labs notable for leukocytosis WBC 25, Hgb 11, lactate 3.5 -> 0.9 s/p 2LNS. CXR noted LLL infiltrate s/p 1g ceftriaxone 69M PMH DM2, diabetic neuropathy for which wheelchair bound, orthostatic hypotension, syncope, afib (on eliquis) presenting with seizure like activity- blank stare with shaking in arms. As per wife, patient has had hx of seizures for several years and takes keppra. Previously on 500mg BID and decreased to 500 mg qD. Last seizure about 2 weeks ago, no neurologist/epileptologist. Patient has not been complaining of fevers, chills. + Cough, nonproductive. + dysphagia, denies abdominal pain, denies chest pain/shortness of breath. Denies dysuria, + incontinence for which uses condom catheter at home.     AT [facility], VS /63, HR 84, RR 16, T 98.6F, O2 sat 94%, labs notable for leukocytosis WBC 25, Hgb 11, lactate 3.5 -> 0.9 s/p 2LNS. CXR noted RLL infiltrate s/p 1g ceftriaxone.

## 2020-02-17 NOTE — H&P ADULT - PROBLEM SELECTOR PLAN 2
Reported recent cough, reported some difficulty with swallowing, CXR with LLL infiltrate s/p 1 dose ceftriaxone, flu negative  -Cef/azithro  -urine legionella, d/c azithro if neg Reported recent cough, reported some difficulty with swallowing, CXR with LLL infiltrate s/p 1 dose ceftriaxone, flu negative  -Cef/azithro  -urine legionella, d/c azithro if neg  -F/u blood cultures Reported recent cough, reported some difficulty with swallowing, CXR with RLL infiltrate s/p 1 dose ceftriaxone, flu negative  -Cef/azithro  -urine legionella, d/c azithro if neg  -F/u blood cultures

## 2020-02-17 NOTE — H&P ADULT - ASSESSMENT
69M PMH DM2, diabetic neuropathy for which wheelchair bound, orthostatic hypotension, syncope, afib (on eliquis) presenting with seizure like activity and recent cough. Patient admitted to regional floor for PNA and work up of seizure like activity.

## 2020-02-17 NOTE — ED PROVIDER NOTE - OBJECTIVE STATEMENT
69M wheelchair bound 2/2 neuropathy, seizure do on keppra (no recent changes), afib on eliquis, iddm, orthostatic hotn on midodrine, c/o witnessed seizure-like activity (staring off into space unresponsive w/ BUE hand shaking) around 8a today lasting ~1h, subsequently referred in by pcp for evaluation after contacted around 4p. +postictal now returned to baseline. at baseline just prior. Rx compliant. no fever/chills, no ha/dizziness, no neck pain/stiffness, no uri/cough, no cp/sob, no abd pain/n/v, no diarrhea/constipation, no dysuria, no rash, no trauma, no etoh, no sick contacts, no recent exposure. last ct head 1/16/2018.    pcp: robel dang 69M wheelchair bound 2/2 neuropathy, seizure do on keppra (no recent changes), afib on eliquis, iddm, orthostatic hotn on midodrine, c/o witnessed seizure-like activity (staring off into space unresponsive w/ BUE hand shaking) while sititng in wheelchair at dining table around 8a today lasting ~1h, subsequently referred in by pcp for evaluation after contacted around 4p. +postictal now returned to baseline. at baseline just prior. Rx compliant. no fever/chills, no ha/dizziness, no neck pain/stiffness, no uri/cough, no cp/sob, no abd pain/n/v, no diarrhea/constipation, no dysuria, no rash, no trauma, no etoh, no sick contacts, no recent exposure. last ct head 1/16/2018.    pcp: robel dang

## 2020-02-17 NOTE — H&P ADULT - PROBLEM SELECTOR PLAN 3
Hx of AFib on eliquis, rate controlled  -eliquis Hx of AFib on eliquis, rate controlled  -c/w eliquis for AC

## 2020-02-17 NOTE — H&P ADULT - NSICDXPASTMEDICALHX_GEN_ALL_CORE_FT
PAST MEDICAL HISTORY:  Atrial fibrillation     Benign neoplasm of brain diagnosed in 2007    Hyperlipidemia HLD (hyperlipidemia)    Mononeuritis Neuropathy    Orthostatic hypotension     Type 2 diabetes mellitus DM (diabetes mellitus)

## 2020-02-17 NOTE — ED ADULT NURSE NOTE - NSIMPLEMENTINTERV_GEN_ALL_ED
Implemented All Fall with Harm Risk Interventions:  [place] to call system. Call bell, personal items and telephone within reach. Instruct patient to call for assistance. Room bathroom lighting operational. Non-slip footwear when patient is off stretcher. Physically safe environment: no spills, clutter or unnecessary equipment. Stretcher in lowest position, wheels locked, appropriate side rails in place. Provide visual cue, wrist band, yellow gown, etc. Monitor gait and stability. Monitor for mental status changes and reorient to person, place, and time. Review medications for side effects contributing to fall risk. Reinforce activity limits and safety measures with patient and family. Provide visual clues: red socks.

## 2020-02-17 NOTE — ED ADULT NURSE NOTE - CHPI ED NUR SYMPTOMS NEG
no change in level of consciousness/no numbness/no blurred vision/no confusion/no fever/no dizziness/no vomiting/no weakness

## 2020-02-17 NOTE — ED PROVIDER NOTE - CARE PLAN
Principal Discharge DX:	Pneumonia  Secondary Diagnosis:	Seizure  Secondary Diagnosis:	Leukocytosis  Secondary Diagnosis:	Elevated troponin Principal Discharge DX:	Pneumonia  Secondary Diagnosis:	Seizure  Secondary Diagnosis:	Leukocytosis  Secondary Diagnosis:	Elevated troponin  Secondary Diagnosis:	Dehydration

## 2020-02-17 NOTE — ED ADULT NURSE NOTE - PMH
Atrial fibrillation    Benign neoplasm of brain  diagnosed in 2007  Hyperlipidemia  HLD (hyperlipidemia)  Mononeuritis  Neuropathy  Orthostatic hypotension    Type 2 diabetes mellitus  DM (diabetes mellitus)

## 2020-02-17 NOTE — ED ADULT NURSE NOTE - OBJECTIVE STATEMENT
69 y.o M a&ox3 to person and place BIBA c.o weakness. as per wife, PT has dementia unknown baseline. brought in today due to witnessed seizure that occurred at 8 am today and lasted "longer than usual" as per wife. unresponsive with bilateral hands shaking. called PCP aroudn 4pm and was told to come to ED. speaking in clear appropriate sentences, airway patent. no hitting head. no falls. no recent fevers, chills. + productive cough, yellow sputum. on keppa, elquis for afib. Abrasion to R knee, no bleeding at this time.

## 2020-02-17 NOTE — ED ADULT TRIAGE NOTE - CHIEF COMPLAINT QUOTE
as per pt's wife, pt had seizure like activity this morning, c/o generalized weaknes & uncontrolled blood sugar. pt is aaxo2

## 2020-02-17 NOTE — H&P ADULT - NSHPLABSRESULTS_GEN_ALL_CORE
LABS:                        11.1   25.50 )-----------( 304      ( 17 Feb 2020 18:42 )             34.1     02-17    137  |  102  |  47<H>  ----------------------------<  172<H>  4.6   |  24  |  1.13    Ca    10.1      17 Feb 2020 19:57  Mg     1.7     02-17    TPro  7.2  /  Alb  3.0<L>  /  TBili  0.5  /  DBili  x   /  AST  16  /  ALT  11  /  AlkPhos  78  02-17    PT/INR - ( 17 Feb 2020 18:43 )   PT: 18.2 sec;   INR: 1.57          PTT - ( 17 Feb 2020 18:43 )  PTT:27.4 sec  Urinalysis Basic - ( 17 Feb 2020 19:42 )    Color: Yellow / Appearance: Clear / SG: >=1.030 / pH: x  Gluc: x / Ketone: Trace mg/dL  / Bili: Negative / Urobili: 0.2 E.U./dL   Blood: x / Protein: 30 mg/dL / Nitrite: NEGATIVE   Leuk Esterase: NEGATIVE / RBC: < 5 /HPF / WBC 5-10 /HPF   Sq Epi: x / Non Sq Epi: 5-10 /HPF / Bacteria: Present /HPF

## 2020-02-17 NOTE — ED PROVIDER NOTE - PROGRESS NOTE DETAILS
unable to get a hold of pcp. will admit to hospitalist. pcp at [facility]. [facility] to admit to medicine/service.

## 2020-02-18 ENCOUNTER — TRANSCRIPTION ENCOUNTER (OUTPATIENT)
Age: 70
End: 2020-02-18

## 2020-02-18 DIAGNOSIS — G62.9 POLYNEUROPATHY, UNSPECIFIED: ICD-10-CM

## 2020-02-18 DIAGNOSIS — I48.91 UNSPECIFIED ATRIAL FIBRILLATION: ICD-10-CM

## 2020-02-18 DIAGNOSIS — R56.9 UNSPECIFIED CONVULSIONS: ICD-10-CM

## 2020-02-18 DIAGNOSIS — Z29.9 ENCOUNTER FOR PROPHYLACTIC MEASURES, UNSPECIFIED: ICD-10-CM

## 2020-02-18 DIAGNOSIS — R63.8 OTHER SYMPTOMS AND SIGNS CONCERNING FOOD AND FLUID INTAKE: ICD-10-CM

## 2020-02-18 DIAGNOSIS — J18.9 PNEUMONIA, UNSPECIFIED ORGANISM: ICD-10-CM

## 2020-02-18 DIAGNOSIS — Z91.89 OTHER SPECIFIED PERSONAL RISK FACTORS, NOT ELSEWHERE CLASSIFIED: ICD-10-CM

## 2020-02-18 DIAGNOSIS — E11.9 TYPE 2 DIABETES MELLITUS WITHOUT COMPLICATIONS: ICD-10-CM

## 2020-02-18 DIAGNOSIS — R79.89 OTHER SPECIFIED ABNORMAL FINDINGS OF BLOOD CHEMISTRY: ICD-10-CM

## 2020-02-18 DIAGNOSIS — I95.1 ORTHOSTATIC HYPOTENSION: ICD-10-CM

## 2020-02-18 LAB
ALBUMIN SERPL ELPH-MCNC: 2.9 G/DL — LOW (ref 3.3–5)
ALP SERPL-CCNC: 70 U/L — SIGNIFICANT CHANGE UP (ref 40–120)
ALT FLD-CCNC: 9 U/L — LOW (ref 10–45)
ANION GAP SERPL CALC-SCNC: 10 MMOL/L — SIGNIFICANT CHANGE UP (ref 5–17)
AST SERPL-CCNC: 12 U/L — SIGNIFICANT CHANGE UP (ref 10–40)
BASOPHILS # BLD AUTO: 0.05 K/UL — SIGNIFICANT CHANGE UP (ref 0–0.2)
BASOPHILS NFR BLD AUTO: 0.3 % — SIGNIFICANT CHANGE UP (ref 0–2)
BILIRUB SERPL-MCNC: 0.4 MG/DL — SIGNIFICANT CHANGE UP (ref 0.2–1.2)
BUN SERPL-MCNC: 36 MG/DL — HIGH (ref 7–23)
CALCIUM SERPL-MCNC: 9.6 MG/DL — SIGNIFICANT CHANGE UP (ref 8.4–10.5)
CHLORIDE SERPL-SCNC: 107 MMOL/L — SIGNIFICANT CHANGE UP (ref 96–108)
CO2 SERPL-SCNC: 24 MMOL/L — SIGNIFICANT CHANGE UP (ref 22–31)
CREAT SERPL-MCNC: 0.8 MG/DL — SIGNIFICANT CHANGE UP (ref 0.5–1.3)
EOSINOPHIL # BLD AUTO: 0.55 K/UL — HIGH (ref 0–0.5)
EOSINOPHIL NFR BLD AUTO: 3.4 % — SIGNIFICANT CHANGE UP (ref 0–6)
FERRITIN SERPL-MCNC: 103 NG/ML — SIGNIFICANT CHANGE UP (ref 30–400)
GLUCOSE BLDC GLUCOMTR-MCNC: 124 MG/DL — HIGH (ref 70–99)
GLUCOSE BLDC GLUCOMTR-MCNC: 165 MG/DL — HIGH (ref 70–99)
GLUCOSE BLDC GLUCOMTR-MCNC: 171 MG/DL — HIGH (ref 70–99)
GLUCOSE BLDC GLUCOMTR-MCNC: 187 MG/DL — HIGH (ref 70–99)
GLUCOSE BLDC GLUCOMTR-MCNC: 199 MG/DL — HIGH (ref 70–99)
GLUCOSE SERPL-MCNC: 125 MG/DL — HIGH (ref 70–99)
HBA1C BLD-MCNC: 7.3 % — HIGH (ref 4–5.6)
HCT VFR BLD CALC: 28.5 % — LOW (ref 39–50)
HGB BLD-MCNC: 9.3 G/DL — LOW (ref 13–17)
IMM GRANULOCYTES NFR BLD AUTO: 0.4 % — SIGNIFICANT CHANGE UP (ref 0–1.5)
IRON SATN MFR SERPL: 17 UG/DL — LOW (ref 45–165)
IRON SATN MFR SERPL: 8 % — LOW (ref 16–55)
LEGIONELLA AG UR QL: NEGATIVE — SIGNIFICANT CHANGE UP
LYMPHOCYTES # BLD AUTO: 14.5 % — SIGNIFICANT CHANGE UP (ref 13–44)
LYMPHOCYTES # BLD AUTO: 2.36 K/UL — SIGNIFICANT CHANGE UP (ref 1–3.3)
MAGNESIUM SERPL-MCNC: 1.4 MG/DL — LOW (ref 1.6–2.6)
MCHC RBC-ENTMCNC: 30.7 PG — SIGNIFICANT CHANGE UP (ref 27–34)
MCHC RBC-ENTMCNC: 32.6 GM/DL — SIGNIFICANT CHANGE UP (ref 32–36)
MCV RBC AUTO: 94.1 FL — SIGNIFICANT CHANGE UP (ref 80–100)
MONOCYTES # BLD AUTO: 1.07 K/UL — HIGH (ref 0–0.9)
MONOCYTES NFR BLD AUTO: 6.6 % — SIGNIFICANT CHANGE UP (ref 2–14)
NEUTROPHILS # BLD AUTO: 12.14 K/UL — HIGH (ref 1.8–7.4)
NEUTROPHILS NFR BLD AUTO: 74.8 % — SIGNIFICANT CHANGE UP (ref 43–77)
NRBC # BLD: 0 /100 WBCS — SIGNIFICANT CHANGE UP (ref 0–0)
PLATELET # BLD AUTO: 268 K/UL — SIGNIFICANT CHANGE UP (ref 150–400)
POTASSIUM SERPL-MCNC: 4 MMOL/L — SIGNIFICANT CHANGE UP (ref 3.5–5.3)
POTASSIUM SERPL-SCNC: 4 MMOL/L — SIGNIFICANT CHANGE UP (ref 3.5–5.3)
PROCALCITONIN SERPL-MCNC: 1.7 NG/ML — HIGH (ref 0.02–0.1)
PROCALCITONIN SERPL-MCNC: 1.83 NG/ML — HIGH (ref 0.02–0.1)
PROT SERPL-MCNC: 6.6 G/DL — SIGNIFICANT CHANGE UP (ref 6–8.3)
RBC # BLD: 3.03 M/UL — LOW (ref 4.2–5.8)
RBC # FLD: 14.1 % — SIGNIFICANT CHANGE UP (ref 10.3–14.5)
SODIUM SERPL-SCNC: 141 MMOL/L — SIGNIFICANT CHANGE UP (ref 135–145)
TIBC SERPL-MCNC: 217 UG/DL — LOW (ref 220–430)
TROPONIN T SERPL-MCNC: 0.02 NG/ML — HIGH (ref 0–0.01)
UIBC SERPL-MCNC: 200 UG/DL — SIGNIFICANT CHANGE UP (ref 110–370)
WBC # BLD: 16.28 K/UL — HIGH (ref 3.8–10.5)
WBC # FLD AUTO: 16.28 K/UL — HIGH (ref 3.8–10.5)

## 2020-02-18 PROCEDURE — 99233 SBSQ HOSP IP/OBS HIGH 50: CPT | Mod: GC

## 2020-02-18 PROCEDURE — 99222 1ST HOSP IP/OBS MODERATE 55: CPT

## 2020-02-18 RX ORDER — CHOLECALCIFEROL (VITAMIN D3) 125 MCG
5000 CAPSULE ORAL EVERY 24 HOURS
Refills: 0 | Status: DISCONTINUED | OUTPATIENT
Start: 2020-02-18 | End: 2020-02-19

## 2020-02-18 RX ORDER — LEVETIRACETAM 250 MG/1
500 TABLET, FILM COATED ORAL EVERY 24 HOURS
Refills: 0 | Status: DISCONTINUED | OUTPATIENT
Start: 2020-02-18 | End: 2020-02-18

## 2020-02-18 RX ORDER — PREGABALIN 225 MG/1
1 CAPSULE ORAL
Qty: 0 | Refills: 0 | DISCHARGE

## 2020-02-18 RX ORDER — ENOXAPARIN SODIUM 100 MG/ML
20 INJECTION SUBCUTANEOUS
Qty: 0 | Refills: 0 | DISCHARGE

## 2020-02-18 RX ORDER — ATORVASTATIN CALCIUM 80 MG/1
10 TABLET, FILM COATED ORAL AT BEDTIME
Refills: 0 | Status: DISCONTINUED | OUTPATIENT
Start: 2020-02-18 | End: 2020-02-19

## 2020-02-18 RX ORDER — FOLIC ACID 0.8 MG
1 TABLET ORAL DAILY
Refills: 0 | Status: DISCONTINUED | OUTPATIENT
Start: 2020-02-18 | End: 2020-02-19

## 2020-02-18 RX ORDER — AMPICILLIN SODIUM AND SULBACTAM SODIUM 250; 125 MG/ML; MG/ML
3 INJECTION, POWDER, FOR SUSPENSION INTRAMUSCULAR; INTRAVENOUS EVERY 6 HOURS
Refills: 0 | Status: DISCONTINUED | OUTPATIENT
Start: 2020-02-18 | End: 2020-02-19

## 2020-02-18 RX ORDER — ATORVASTATIN CALCIUM 80 MG/1
0.5 TABLET, FILM COATED ORAL
Qty: 0 | Refills: 0 | DISCHARGE

## 2020-02-18 RX ORDER — INSULIN LISPRO 100/ML
VIAL (ML) SUBCUTANEOUS
Refills: 0 | Status: DISCONTINUED | OUTPATIENT
Start: 2020-02-18 | End: 2020-02-19

## 2020-02-18 RX ORDER — LEVETIRACETAM 250 MG/1
1 TABLET, FILM COATED ORAL
Qty: 0 | Refills: 0 | DISCHARGE
Start: 2020-02-18

## 2020-02-18 RX ORDER — MIDODRINE HYDROCHLORIDE 2.5 MG/1
5 TABLET ORAL THREE TIMES A DAY
Refills: 0 | Status: DISCONTINUED | OUTPATIENT
Start: 2020-02-18 | End: 2020-02-19

## 2020-02-18 RX ORDER — MAGNESIUM SULFATE 500 MG/ML
2 VIAL (ML) INJECTION ONCE
Refills: 0 | Status: COMPLETED | OUTPATIENT
Start: 2020-02-18 | End: 2020-02-18

## 2020-02-18 RX ORDER — LEVETIRACETAM 250 MG/1
1 TABLET, FILM COATED ORAL
Qty: 0 | Refills: 0 | DISCHARGE

## 2020-02-18 RX ORDER — PREGABALIN 225 MG/1
50 CAPSULE ORAL EVERY 24 HOURS
Refills: 0 | Status: DISCONTINUED | OUTPATIENT
Start: 2020-02-18 | End: 2020-02-18

## 2020-02-18 RX ORDER — MIRTAZAPINE 45 MG/1
7.5 TABLET, ORALLY DISINTEGRATING ORAL AT BEDTIME
Refills: 0 | Status: DISCONTINUED | OUTPATIENT
Start: 2020-02-18 | End: 2020-02-19

## 2020-02-18 RX ORDER — METFORMIN HYDROCHLORIDE 850 MG/1
1 TABLET ORAL
Qty: 0 | Refills: 0 | DISCHARGE

## 2020-02-18 RX ORDER — GLUCAGON INJECTION, SOLUTION 0.5 MG/.1ML
1 INJECTION, SOLUTION SUBCUTANEOUS ONCE
Refills: 0 | Status: DISCONTINUED | OUTPATIENT
Start: 2020-02-18 | End: 2020-02-19

## 2020-02-18 RX ORDER — MIRTAZAPINE 45 MG/1
0.5 TABLET, ORALLY DISINTEGRATING ORAL
Qty: 0 | Refills: 0 | DISCHARGE

## 2020-02-18 RX ORDER — DEXTROSE 50 % IN WATER 50 %
25 SYRINGE (ML) INTRAVENOUS ONCE
Refills: 0 | Status: DISCONTINUED | OUTPATIENT
Start: 2020-02-18 | End: 2020-02-19

## 2020-02-18 RX ORDER — RISPERIDONE 4 MG/1
1 TABLET ORAL EVERY 8 HOURS
Refills: 0 | Status: DISCONTINUED | OUTPATIENT
Start: 2020-02-18 | End: 2020-02-19

## 2020-02-18 RX ORDER — AZITHROMYCIN 500 MG/1
500 TABLET, FILM COATED ORAL ONCE
Refills: 0 | Status: COMPLETED | OUTPATIENT
Start: 2020-02-18 | End: 2020-02-18

## 2020-02-18 RX ORDER — RISPERIDONE 4 MG/1
1 TABLET ORAL
Qty: 0 | Refills: 0 | DISCHARGE

## 2020-02-18 RX ORDER — RISPERIDONE 4 MG/1
2 TABLET ORAL
Qty: 0 | Refills: 0 | DISCHARGE

## 2020-02-18 RX ORDER — MIRTAZAPINE 45 MG/1
1 TABLET, ORALLY DISINTEGRATING ORAL
Qty: 0 | Refills: 0 | DISCHARGE

## 2020-02-18 RX ORDER — LEVETIRACETAM 250 MG/1
250 TABLET, FILM COATED ORAL EVERY 12 HOURS
Refills: 0 | Status: DISCONTINUED | OUTPATIENT
Start: 2020-02-18 | End: 2020-02-19

## 2020-02-18 RX ORDER — APIXABAN 2.5 MG/1
5 TABLET, FILM COATED ORAL EVERY 12 HOURS
Refills: 0 | Status: DISCONTINUED | OUTPATIENT
Start: 2020-02-18 | End: 2020-02-19

## 2020-02-18 RX ORDER — DEXTROSE 50 % IN WATER 50 %
15 SYRINGE (ML) INTRAVENOUS ONCE
Refills: 0 | Status: DISCONTINUED | OUTPATIENT
Start: 2020-02-18 | End: 2020-02-19

## 2020-02-18 RX ORDER — CEFTRIAXONE 500 MG/1
1000 INJECTION, POWDER, FOR SOLUTION INTRAMUSCULAR; INTRAVENOUS EVERY 24 HOURS
Refills: 0 | Status: DISCONTINUED | OUTPATIENT
Start: 2020-02-18 | End: 2020-02-18

## 2020-02-18 RX ORDER — CHOLECALCIFEROL (VITAMIN D3) 125 MCG
1 CAPSULE ORAL
Qty: 0 | Refills: 0 | DISCHARGE

## 2020-02-18 RX ORDER — SODIUM CHLORIDE 9 MG/ML
1000 INJECTION, SOLUTION INTRAVENOUS
Refills: 0 | Status: DISCONTINUED | OUTPATIENT
Start: 2020-02-18 | End: 2020-02-19

## 2020-02-18 RX ORDER — DEXTROSE 50 % IN WATER 50 %
12.5 SYRINGE (ML) INTRAVENOUS ONCE
Refills: 0 | Status: DISCONTINUED | OUTPATIENT
Start: 2020-02-18 | End: 2020-02-19

## 2020-02-18 RX ADMIN — MIDODRINE HYDROCHLORIDE 5 MILLIGRAM(S): 2.5 TABLET ORAL at 12:28

## 2020-02-18 RX ADMIN — Medication 5000 UNIT(S): at 03:12

## 2020-02-18 RX ADMIN — RISPERIDONE 1 MILLIGRAM(S): 4 TABLET ORAL at 22:04

## 2020-02-18 RX ADMIN — APIXABAN 5 MILLIGRAM(S): 2.5 TABLET, FILM COATED ORAL at 06:11

## 2020-02-18 RX ADMIN — LEVETIRACETAM 500 MILLIGRAM(S): 250 TABLET, FILM COATED ORAL at 03:12

## 2020-02-18 RX ADMIN — MIDODRINE HYDROCHLORIDE 5 MILLIGRAM(S): 2.5 TABLET ORAL at 17:53

## 2020-02-18 RX ADMIN — LEVETIRACETAM 250 MILLIGRAM(S): 250 TABLET, FILM COATED ORAL at 22:03

## 2020-02-18 RX ADMIN — MIRTAZAPINE 7.5 MILLIGRAM(S): 45 TABLET, ORALLY DISINTEGRATING ORAL at 22:06

## 2020-02-18 RX ADMIN — AMPICILLIN SODIUM AND SULBACTAM SODIUM 200 GRAM(S): 250; 125 INJECTION, POWDER, FOR SUSPENSION INTRAMUSCULAR; INTRAVENOUS at 23:32

## 2020-02-18 RX ADMIN — Medication 100 GRAM(S): at 09:42

## 2020-02-18 RX ADMIN — AZITHROMYCIN 500 MILLIGRAM(S): 500 TABLET, FILM COATED ORAL at 03:12

## 2020-02-18 RX ADMIN — APIXABAN 5 MILLIGRAM(S): 2.5 TABLET, FILM COATED ORAL at 17:53

## 2020-02-18 RX ADMIN — ATORVASTATIN CALCIUM 10 MILLIGRAM(S): 80 TABLET, FILM COATED ORAL at 22:03

## 2020-02-18 RX ADMIN — AMPICILLIN SODIUM AND SULBACTAM SODIUM 200 GRAM(S): 250; 125 INJECTION, POWDER, FOR SUSPENSION INTRAMUSCULAR; INTRAVENOUS at 17:53

## 2020-02-18 RX ADMIN — Medication 2: at 22:17

## 2020-02-18 RX ADMIN — Medication 2: at 17:53

## 2020-02-18 RX ADMIN — MIDODRINE HYDROCHLORIDE 5 MILLIGRAM(S): 2.5 TABLET ORAL at 06:11

## 2020-02-18 RX ADMIN — RISPERIDONE 1 MILLIGRAM(S): 4 TABLET ORAL at 13:58

## 2020-02-18 RX ADMIN — RISPERIDONE 1 MILLIGRAM(S): 4 TABLET ORAL at 06:11

## 2020-02-18 RX ADMIN — AMPICILLIN SODIUM AND SULBACTAM SODIUM 200 GRAM(S): 250; 125 INJECTION, POWDER, FOR SUSPENSION INTRAMUSCULAR; INTRAVENOUS at 12:28

## 2020-02-18 RX ADMIN — Medication 2: at 12:28

## 2020-02-18 RX ADMIN — Medication 1 MILLIGRAM(S): at 12:28

## 2020-02-18 NOTE — PROGRESS NOTE ADULT - SUBJECTIVE AND OBJECTIVE BOX
O/N Events: admitted to medicine.    Subjective: Patient reporting that he is hungry and confused. Continues to have a non-productive cough. Denies melena, hematuria, BRBPR, dysuria, increased urge to urinate, rhinorrhea, sore throat, fevers.    VITALS  Vital Signs Last 24 Hrs  T(C): 36.7 (18 Feb 2020 13:56), Max: 36.9 (17 Feb 2020 18:48)  T(F): 98.1 (18 Feb 2020 13:56), Max: 98.5 (17 Feb 2020 18:48)  HR: 73 (18 Feb 2020 13:56) (61 - 92)  BP: 108/62 (18 Feb 2020 13:56) (92/65 - 115/62)  BP(mean): 77 (18 Feb 2020 13:56) (70 - 77)  RR: 16 (18 Feb 2020 13:56) (16 - 18)  SpO2: 95% (18 Feb 2020 13:56) (94% - 97%)    CAPILLARY BLOOD GLUCOSE  POCT Blood Glucose.: 199 mg/dL (18 Feb 2020 12:04)  POCT Blood Glucose.: 124 mg/dL (18 Feb 2020 08:54)  POCT Blood Glucose.: 171 mg/dL (18 Feb 2020 00:26)      PHYSICAL EXAM  General appearance: patient lying in bed with vEEG in place, speaking slowly, NAD  HEENT: PERRLA, EOMI, sclera anicteric, uvula midline, MMM  Respiratory: crackles present in the LML otherwise clear to auscultation throughout, no accessory muscle use, no wheezing  Cardiovascular: regular rate and rhythm, normal S1/S2, no murmurs, rubs or gallops appreciated  Gastrointestinal: soft, non-tender, non-distended, normoactive bowel sounds, mass felt in mid abdomen below the umbilicus  Extremities: WWP, large scab present on right knee, no lower extremity edema  Neurological: AOx2 (self, location), CNs II-XII intact, strength 5/5 throughout (shoulders/biceps/triceps/handgrip/hip flexors/dorsi/plantarflexion), sensation to light touch intact throughout, proprioception of 1st toes moderately intact (patient was able to accurately report direction of toes 50% of the time)    MEDICATIONS  (STANDING):  ampicillin/sulbactam  IVPB 3 Gram(s) IV Intermittent every 6 hours  apixaban 5 milliGRAM(s) Oral every 12 hours  atorvastatin 10 milliGRAM(s) Oral at bedtime  cholecalciferol 5000 Unit(s) Oral every 24 hours  dextrose 5%. 1000 milliLiter(s) (50 mL/Hr) IV Continuous <Continuous>  dextrose 50% Injectable 12.5 Gram(s) IV Push once  dextrose 50% Injectable 25 Gram(s) IV Push once  dextrose 50% Injectable 25 Gram(s) IV Push once  folic acid 1 milliGRAM(s) Oral daily  insulin lispro (HumaLOG) corrective regimen sliding scale   SubCutaneous four times a day before meals  levETIRAcetam 500 milliGRAM(s) Oral every 24 hours  midodrine. 5 milliGRAM(s) Oral three times a day  mirtazapine 7.5 milliGRAM(s) Oral at bedtime  risperiDONE   Tablet 1 milliGRAM(s) Oral every 8 hours    MEDICATIONS  (PRN):  dextrose 40% Gel 15 Gram(s) Oral once PRN Blood Glucose LESS THAN 70 milliGRAM(s)/deciliter  glucagon  Injectable 1 milliGRAM(s) IntraMuscular once PRN Glucose LESS THAN 70 milligrams/deciliter      Allergy Status Unknown  codeine (Other)      LABS                        9.3    16.28 )-----------( 268      ( 18 Feb 2020 08:00 )             28.5     02-18    141  |  107  |  36<H>  ----------------------------<  125<H>  4.0   |  24  |  0.80    Ca    9.6      18 Feb 2020 08:00  Mg     1.4     02-18    TPro  6.6  /  Alb  2.9<L>  /  TBili  0.4  /  DBili  x   /  AST  12  /  ALT  9<L>  /  AlkPhos  70  02-18    PT/INR - ( 17 Feb 2020 18:43 )   PT: 18.2 sec;   INR: 1.57          PTT - ( 17 Feb 2020 18:43 )  PTT:27.4 sec  Urinalysis Basic - ( 17 Feb 2020 19:42 )    Color: Yellow / Appearance: Clear / SG: >=1.030 / pH: x  Gluc: x / Ketone: Trace mg/dL  / Bili: Negative / Urobili: 0.2 E.U./dL   Blood: x / Protein: 30 mg/dL / Nitrite: NEGATIVE   Leuk Esterase: NEGATIVE / RBC: < 5 /HPF / WBC 5-10 /HPF   Sq Epi: x / Non Sq Epi: 5-10 /HPF / Bacteria: Present /HPF      CARDIAC MARKERS ( 18 Feb 2020 08:00 )  x     / 0.02 ng/mL / x     / x     / x      CARDIAC MARKERS ( 17 Feb 2020 18:42 )  x     / 0.03 ng/mL / x     / x     / x            PROCEDURES/IMAGING: reviewed.

## 2020-02-18 NOTE — PHYSICAL THERAPY INITIAL EVALUATION ADULT - DIAGNOSIS, PT EVAL
6B: impaired aerobic capacity/endurance associated with deconditioning ; 5A: Primary prevention/risk reduction for loss of balance and falling

## 2020-02-18 NOTE — DISCHARGE NOTE PROVIDER - NSDCCPCAREPLAN_GEN_ALL_CORE_FT
PRINCIPAL DISCHARGE DIAGNOSIS  Diagnosis: Pneumonia  Assessment and Plan of Treatment: During your admission you were treated for an aspiration pneumonia. We suspect you aspirated fluid when you were having a seizure-like episode prior to coming to the ED. You complained of cough and had evidence of pneumona on chest x-ray. For these reasons you were treated with antibiotics, which you will continued to take when you are discharged. It is important that you follow up with your Primary Care Provider when you leave to make sure that your symptoms have resolved. If you develop worsening cough, become feverish or have difficulty breathing, please return to the ED for further evaluation.      SECONDARY DISCHARGE DIAGNOSES  Diagnosis: Seizure  Assessment and Plan of Treatment: You were evaluated for seizures during your inpatient stay. You were placed on EEG monitoring and did not have any seizure activity. You were also seen by epilepsy specialists who changed your dose of Keppra to 250 mg twice per day. It is important that you follow up with Dr. Salinas or your neurologist (Dr. Longoria) when you leave so that you have the right medication regimen to treat your seizures. PRINCIPAL DISCHARGE DIAGNOSIS  Diagnosis: Pneumonia  Assessment and Plan of Treatment: During your admission you were treated for an aspiration pneumonia. We suspect you aspirated fluid when you were having a seizure-like episode prior to coming to the ED. You complained of cough and had evidence of pneumona on chest x-ray. For these reasons you were treated with antibiotics, which you will continue to take when you are discharged. The antibiotic is called augmentin and you will take it two times a day for 5 days (last day 2/23). It is important that you follow up with your Primary Care Provider when you leave to make sure that your symptoms have resolved. If you develop worsening cough, become feverish or have difficulty breathing, please return to the ED for further evaluation.      SECONDARY DISCHARGE DIAGNOSES  Diagnosis: Seizure  Assessment and Plan of Treatment: You were evaluated for seizures during your inpatient stay. You were placed on EEG monitoring and did not have any seizure activity. You were also seen by epilepsy specialists who changed your dose of Keppra to 250 mg twice per day. It is important that you follow up with Dr. Salinas or your neurologist (Dr. Longoria) when you leave so that you have the right medication regimen to treat your seizures.

## 2020-02-18 NOTE — SWALLOW BEDSIDE ASSESSMENT ADULT - SLP GENERAL OBSERVATIONS
Pt received asleep in bed, roused to verbal cues, lethargic. Pt oriented to self and place, not to year. Pt followed 1-step directives. Speech was dysarthric.

## 2020-02-18 NOTE — PROGRESS NOTE ADULT - PROBLEM SELECTOR PLAN 9
1) PCP Contacted on Admission: (Y/N) --> Name & Phone #: Dr. Danny Garcia  2) Date of Contact with PCP: 2/18  3) PCP Contacted at Discharge: (Y/N, N/A)  4) Summary of Handoff Given to PCP:   5) Post-Discharge Appointment Date and Location:

## 2020-02-18 NOTE — PATIENT PROFILE ADULT - STATED REASON FOR ADMISSION
Seizure like activity witnessed by his wife at home. Patient was staring off into space as well as having B/L upper extremity shaking.

## 2020-02-18 NOTE — CONSULT NOTE ADULT - ASSESSMENT
69 year-old-right-handed man with PMH of DM2, diabetic neuropathy for which he is wheelchair bound, orthostatic hypotension, syncope, afib (on eliquis), and on keppra for suspected seizures who was admitted on 2/17 for an episode of unresponsiveness. Most likely episode of prolonged unresponsiveness was related to multiple factors related to metabolic derangement such as hyperglycemia, hypotension coupled with infectious process from community acquired PNA (dx on admission). EEG has shown no seizure activity or interictal discharges. However, seizure cannot be entirely ruled out, and may have been provoked from both metabolic and infectious process previously mentioned.    Recommendations:  Disconnect vEEG monitoring 69 year-old-right-handed man with PMH of DM2, diabetic neuropathy for which he is wheelchair bound, orthostatic hypotension, syncope, afib (on eliquis), and on keppra for suspected seizures who was admitted on 2/17 for an episode of unresponsiveness. Most likely episode of prolonged unresponsiveness was related to multiple factors related to metabolic derangement such as hyperglycemia, hypotension coupled with infectious process from community acquired PNA (dx on admission). EEG has shown no seizure activity or interictal discharges. However, seizure cannot be entirely ruled out, and may have been provoked from both metabolic and infectious process previously mentioned.    Recommendations:  Discontinue vEEG monitoring   Please change keppra 500mg QD to 250mg Q12hrs  F/U with outpatient neurologist or DR Salinas in 4 weeks  Maintain seizure and fall precautions 69-year-old-right-handed man with PMH of DM2, diabetic neuropathy for which he is wheelchair bound, orthostatic hypotension, syncope, afib (on eliquis), and on keppra for suspected seizures who was admitted on 2/17 for an episode of unresponsiveness.  Unclear whether the episode was truly a seizure -- very long duration argues against it, and there are other reasons for his staring and unresponsiveness including hypotension, hyperglycemia, and concurrent infection.  If this was a seizure, it was likely provoked by the pneumonia.  Thus, would not recommend increasing AED dosing at this time, but would switch to 250 mg BID as this is a more appropriate dosing schedule for a patient with normal renal function.      Recommendations:  Discontinue vEEG monitoring   Please change keppra 500mg QD to 250mg Q12hrs  F/U with outpatient neurologist (has previously seen Dr. Longoria) or Dr Salinas in 4 weeks  Maintain seizure and fall precautions    Epilepsy will sign off; please call with any additional questions or concerns.

## 2020-02-18 NOTE — DISCHARGE NOTE PROVIDER - PROVIDER TOKENS
PROVIDER:[TOKEN:[[id number]]],PROVIDER:[TOKEN:[[id number]]],PROVIDER:[TOKEN:[[id number]]]

## 2020-02-18 NOTE — DISCHARGE NOTE PROVIDER - CARE PROVIDERS DIRECT ADDRESSES
[email or web address]_Unknown,DirectAddress_Unknown

## 2020-02-18 NOTE — SWALLOW BEDSIDE ASSESSMENT ADULT - SWALLOW EVAL: DIAGNOSIS
Mild oral deficits, confounded by lethargy. Recs for mechanical soft diet w/ thin liquids. Aspiration precautions in place. This SVC will f/u to assess tolerance.

## 2020-02-18 NOTE — PROGRESS NOTE ADULT - PROBLEM SELECTOR PLAN 8
F: none  E: Replete electrolytes to maintain K>4 and Mg>2  N: Mechanical soft with thin liquids (rec by S&S)

## 2020-02-18 NOTE — PROGRESS NOTE ADULT - PROBLEM SELECTOR PLAN 6
HX of neuropathy, previously on gabapentin, but no longer. Wheelchair bound, unable to ambulate  - continue home folate

## 2020-02-18 NOTE — PROGRESS NOTE ADULT - ATTENDING COMMENTS
Patient seen and examined at bedside. Agree with exam, a/p as above with the following addendum/edits:     69 year old M w/ PMH of epilepsy on Keppra w/ recent reduction on dose, ?autonomic neuropathy, A fib on Eliquis p/w likely aspiration PNA in setting of seizure. He feels well today, is able to provide history but has some cognitive slowing. RLL infiltrate and leukocytosis pointing to aspiration PNA, can switch to Unasyn today, WBC is downtrending. F/u epilepsy recs. Blood cultures are negative and pre-renal HAYDEE has resolved. Anticipated d/c today or tomorrow.

## 2020-02-18 NOTE — DISCHARGE NOTE PROVIDER - NSDCFUADDAPPT_GEN_ALL_CORE_FT
Please schedule a follow up appointment with your PCP on discharge.   Please follow up with the neurologist, either Dr. Longoria or Dr. Monterroso, within 4 wks after discharge.

## 2020-02-18 NOTE — PROGRESS NOTE ADULT - PROBLEM SELECTOR PLAN 4
Hx of DM on home sliding scale, hx of neuropathy for which wheelchair bound (for two year).  - continue ISS

## 2020-02-18 NOTE — SWALLOW BEDSIDE ASSESSMENT ADULT - COMMENTS
Pt denied history of dysphagia. Pt reported recent difficulty swallowing, upon admission, but could not describe symptoms. Pt denied odynophagia.

## 2020-02-18 NOTE — PHYSICAL THERAPY INITIAL EVALUATION ADULT - GENERAL OBSERVATIONS, REHAB EVAL
Patient received semi-supine no acute distress +EEG +[place] cath +IV +bed alarm, cleared for PT by MILTON Gilmore, agreeable to PT Eval. Left seated out of bed no acute distress +chair alarm +call lazar, MILTON Gilmore aware.

## 2020-02-18 NOTE — DISCHARGE NOTE PROVIDER - NSDCMRMEDTOKEN_GEN_ALL_CORE_FT
apixaban 5 mg oral tablet: 1 tab(s) orally every 12 hours  atorvastatin 20 mg oral tablet: 0.5 tab(s) orally once a day (at bedtime)  folic acid 1 mg oral tablet: 1 tab(s) orally once a day  HumaLOG KwikPen 100 units/mL subcutaneous solution:  subcutaneous Before Meals and at Bedtime Correctional Scale   Keppra 500 mg oral tablet: 1 tab(s) orally once a day (at bedtime)  midodrine 5 mg oral tablet: 1 tab(s) orally 3 times a day  mirtazapine 15 mg oral tablet: 0.5 tab(s) orally once a day (at bedtime)  risperiDONE 1 mg oral tablet: 1 cap(s) orally 3 times a day (before meals)  Vitamin B12 50 mcg oral tablet: 1 tab(s) orally once a day  Vitamin D3 5000 intl units (125 mcg) oral capsule: 1 cap(s) orally once a day apixaban 5 mg oral tablet: 1 tab(s) orally every 12 hours  atorvastatin 20 mg oral tablet: 0.5 tab(s) orally once a day (at bedtime)  folic acid 1 mg oral tablet: 1 tab(s) orally once a day  HumaLOG KwikPen 100 units/mL subcutaneous solution:  subcutaneous Before Meals and at Bedtime Correctional Scale   levETIRAcetam 250 mg oral tablet: 1 tab(s) orally every 12 hours  midodrine 5 mg oral tablet: 1 tab(s) orally 3 times a day  mirtazapine 15 mg oral tablet: 0.5 tab(s) orally once a day (at bedtime)  risperiDONE 1 mg oral tablet: 1 cap(s) orally 3 times a day (before meals)  Vitamin B12 50 mcg oral tablet: 1 tab(s) orally once a day  Vitamin D3 5000 intl units (125 mcg) oral capsule: 1 cap(s) orally once a day amoxicillin-clavulanate 875 mg-125 mg oral tablet: 1 tab(s) orally 2 times a day   apixaban 5 mg oral tablet: 1 tab(s) orally every 12 hours  atorvastatin 20 mg oral tablet: 0.5 tab(s) orally once a day (at bedtime)  folic acid 1 mg oral tablet: 1 tab(s) orally once a day  HumaLOG KwikPen 100 units/mL subcutaneous solution:  subcutaneous Before Meals and at Bedtime Correctional Scale   levETIRAcetam 250 mg oral tablet: 1 tab(s) orally every 12 hours  midodrine 5 mg oral tablet: 1 tab(s) orally 3 times a day  mirtazapine 15 mg oral tablet: 0.5 tab(s) orally once a day (at bedtime)  risperiDONE 1 mg oral tablet: 1 cap(s) orally 3 times a day (before meals)  Vitamin B12 50 mcg oral tablet: 1 tab(s) orally once a day  Vitamin D3 5000 intl units (125 mcg) oral capsule: 1 cap(s) orally once a day

## 2020-02-18 NOTE — PHYSICAL THERAPY INITIAL EVALUATION ADULT - CRITERIA FOR SKILLED THERAPEUTIC INTERVENTIONS
risk reduction/prevention/rehab potential/predicted duration of therapy intervention/functional limitations in following categories/therapy frequency/anticipated discharge recommendation/impairments found/anticipated equipment needs at discharge

## 2020-02-18 NOTE — DISCHARGE NOTE PROVIDER - CARE PROVIDER_API CALL
Cindy Salinas)  Neurology  [address] [place] [address]  Phone: [phone]  Fax: [phone]  Follow Up Time:     Diane Longoria)  Neurology  [address] [place] [address], [address]  [place] [address]  Phone: [phone]  Fax: [phone]  Follow Up Time:     Danny Garcia)  Family Medicine  [address] [place] [address]  Phone: [phone]  Fax: [phone]  Follow Up Time:

## 2020-02-18 NOTE — PROGRESS NOTE ADULT - PROBLEM SELECTOR PLAN 2
Recent cough and suspected aspiration event during seizure yesterday. CXR with bilateral haziness. Crackles in left mid lung. Procal elevated, urine legionella negative. Initially treated for CAP, now abx switched to cover aspiration pneumonia. BCx negative, UCx negative.  - continue Unasyn (2/18 -)

## 2020-02-18 NOTE — PATIENT PROFILE ADULT - VISION (WITH CORRECTIVE LENSES IF THE PATIENT USUALLY WEARS THEM):
Has cataracts b/l/Partially impaired: cannot see medication labels or newsprint, but can see obstacles in path, and the surrounding layout; can count fingers at arm's length

## 2020-02-18 NOTE — CONSULT NOTE ADULT - ATTENDING COMMENTS
50 minutes spent on this encounter of which >50% was spent counseling the patient and wife about differential diagnosis, medication change, and follow up plan; and coordinating care with the primary team.

## 2020-02-18 NOTE — EEG REPORT - NS EEG TEXT BOX
Continuous EEG Report  2/18/2020 @ 2:39:48 AM to 07:00 AM    Background:  continuous, with predominantly alpha/theta frequencies.  Symmetry:  No persistent asymmetries of voltage or frequency.  Posterior Dominant Rhythm:  7 Hz symmetric, well-organized, and well-modulated.  Organization: Rudimentary.  Voltage:  Normal (20+ uV)  Variability: Yes. 						  Reactivity: Yes.  N2 sleep: Symmetric, synchronous spindles and K complexes.  Spontaneous Activity:  No epileptiform discharges.  Periodic/rhythmic activity:  None.  Events:  No electrographic seizures or significant clinical events.  Provocations:  Hyperventilation and Photic stimulation: was not performed.    Daily Summary:    1)	Mild to moderate generalized background slowing, indicating diffuse or multifocal cerebral dysfunction.  2)	No epileptiform activity and no significant clinical events occurred.    Read by: Cindy Salinas MD

## 2020-02-18 NOTE — PROGRESS NOTE ADULT - PROBLEM SELECTOR PLAN 1
HX of seizures, on keppra 500 mg qD. Has not seen a neurologist in 1 year. CTH negative, vEEG negative. Unclear why patient is on this dose of Keppra.  - epilepsy consulted, f/u recs  - continue Keppra, f/u epilepsy recs on dosing  - f/u Keppra level    #Depression  History of depression.  - continue home risperidone 3 mg and mirtazapine 7.5 mg

## 2020-02-18 NOTE — SWALLOW BEDSIDE ASSESSMENT ADULT - SLP PERTINENT HISTORY OF CURRENT PROBLEM
PNA, work-up of seizure-like activity. Pt reported recent cough and some difficulty with swallowing. CXR w/ RLL infiltrate s/p 1 dose ceftriaxone, flu negative. PMHx includes DM2, diabetic neuropathy, wheelchair bound, orthostatic hypotension, syncope, afib (on eliquis).

## 2020-02-18 NOTE — PHYSICAL THERAPY INITIAL EVALUATION ADULT - PERTINENT HX OF CURRENT PROBLEM, REHAB EVAL
69M PMH DM2, diabetic neuropathy for which wheelchair bound, orthostatic hypotension, syncope, afib (on eliquis) presenting with seizure like activity- blank stare with shaking in arms. As per wife, patient has had hx of seizures for several years and takes keppra.

## 2020-02-18 NOTE — SWALLOW BEDSIDE ASSESSMENT ADULT - ORAL PREPARATORY PHASE
Prolonged yet functional mastication of solids. Mildly reduced bolus formation/manipulation./Reduced oral grading

## 2020-02-18 NOTE — DISCHARGE NOTE PROVIDER - HOSPITAL COURSE
69M PMH DM2, diabetic neuropathy for which wheelchair bound, orthostatic hypotension, syncope, afib (on eliquis) presenting with seizure like activity and recent cough. Patient admitted to regional floor for PNA and work up of seizure like activity.         #Seizures    History of seizures, presenting after a questionable seizure at home. Previously on Keppra 500 mg daily. vEEG placed, no seizure activity observed. Seen by epilepsy and Keppra dose was changed to 250 mg BID. Based on history, unlikely to be true seizure. Suspect episode of staring off into space more likely related to hyperglycemia, orthostatic hypotension and pneumonia. Previously followed with Dr. Longoria, however, has not seen this provider in one year. Will follow up with Dr. Longoria or Dr. Monterroso on discharge.        #Aspiration Pneumonia    Suspected to have an aspiration event during seizure-like episode prior to admission. Noted on CXR to have bilateral patchiness. Patient was treated with Unasyn starting 2/18. Will be discharged on Augmentin. Seen by speech and swallow who recommended mechanical soft diet with thin liquids. Patient noted to be at risk of aspiration.        #Diabetic neuropathy    Per patient, has been wheelchair bound for two years due to neuropathy. On exam, strength was 5/5 throughout upper and lower extremities. Evaluated by PT with recommendation for home with home PT.        #DM2    A1C during admission 7.3. Treated with insulin sliding scale.        #Depression    Continued on home Mirtazapine and Risperidone.        #Atrial Fibrillation    Continued on home Eliquis        #Orthostatic Hypotension    Continued on home Midodrine.        Medication changes: Keppra 250 mg BID 69M PMH DM2, diabetic neuropathy for which wheelchair bound, orthostatic hypotension, syncope, afib (on eliquis) presenting with seizure like activity and recent cough. Patient admitted to regional floor for PNA and work up of seizure like activity.         #Seizures    History of seizures, presenting after a questionable seizure at home. Previously on Keppra 500 mg daily. vEEG placed, no seizure activity observed. Seen by epilepsy and Keppra dose was changed to 250 mg BID. Based on history, unlikely to be true seizure. Suspect episode of staring off into space more likely related to hyperglycemia, orthostatic hypotension and pneumonia. Previously followed with Dr. Longoria, however, has not seen this provider in one year. Will follow up with Dr. Longoria or Dr. Monterroso on discharge. Keppra was changed from 500 qdaily to 250 BID.         #Aspiration Pneumonia    Suspected to have an aspiration event during seizure-like episode prior to admission. Noted on CXR to have bilateral patchiness. Patient was treated with Unasyn starting 2/18. Will be discharged on Augmentin for total 5 day course (last day 2/23). Seen by speech and swallow who recommended mechanical soft diet with thin liquids. Patient noted to be at risk of aspiration.        #Diabetic neuropathy    Per patient, has been wheelchair bound for two years due to neuropathy. On exam, strength was 5/5 throughout upper and lower extremities. Evaluated by PT with recommendation for home with home PT.        #DM2    A1C during admission 7.3. Treated with insulin sliding scale.        #Depression    Continued on home Mirtazapine and Risperidone.        #Atrial Fibrillation    Continued on home Eliquis        #Orthostatic Hypotension    Continued on home Midodrine.        Medication changes: Keppra 250 mg BID    New Medications: Augmentin (875-125) BID x5 days (last day 2/23) 69M PMH DM2, diabetic neuropathy for which wheelchair bound, orthostatic hypotension, syncope, afib (on eliquis) presenting with seizure like activity and recent cough. Patient admitted to regional floor for PNA and work up of seizure like activity.         #Seizures    History of seizures, presenting after a questionable seizure at home. Previously on Keppra 500 mg daily. vEEG placed, no seizure activity observed. Seen by epilepsy and Keppra dose was changed to 250 mg BID. Based on history, unlikely to be true seizure. Suspect episode of staring off into space more likely related to hyperglycemia, orthostatic hypotension and pneumonia. Previously followed with Dr. Longoria, however, has not seen this provider in one year. Will follow up with Dr. Longoria or Dr. Monterroso on discharge. Keppra was changed from 500 qdaily to 250 BID.         #Aspiration Pneumonia    Suspected to have an aspiration event during seizure-like episode prior to admission. Noted on CXR to have bilateral patchiness. Patient was treated with Unasyn starting 2/18. Will be discharged on Augmentin for total 5 day course (last day 2/23). Seen by speech and swallow who recommended mechanical soft diet with thin liquids. Patient noted to be at risk of aspiration.        #Diabetic neuropathy    Per patient, has been wheelchair bound for two years due to neuropathy. On exam, strength was 5/5 throughout upper and lower extremities. Evaluated by PT with recommendation for home with home PT.        #DM2    A1C during admission 7.3. Treated with insulin sliding scale.        #Depression    Continued on home Mirtazapine and Risperidone.        #Atrial Fibrillation    Continued on home Eliquis        #Orthostatic Hypotension    Continued on home Midodrine.        #HAYDEE    Pre renal in nature. Baseline creatinine .85 from prior labs. Resolved with adequate hydration.         Medication changes: Keppra 250 mg BID    New Medications: Augmentin (875-125) BID x5 days (last day 2/23)

## 2020-02-18 NOTE — SWALLOW BEDSIDE ASSESSMENT ADULT - SWALLOW EVAL: RECOMMENDED FEEDING/EATING TECHNIQUES
maintain upright posture during/after eating for 30 mins/***Small bites/sips, one at a time/no straws/allow for swallow between intakes/position upright (90 degrees)/small sips/bites/oral hygiene

## 2020-02-18 NOTE — PHYSICAL THERAPY INITIAL EVALUATION ADULT - ADDITIONAL COMMENTS
patient reports he lives with wife in apt, no other help at home, w/c bound at baseline 2/2 diabetic neuropathy b/l LEs, reports he is able to perform bed mobility and transfers independently in/out of w/c at baseline without wife's assist. wife at home to assist as needed. patient may be questionable historian, wife not present to verify social hx.

## 2020-02-18 NOTE — SWALLOW BEDSIDE ASSESSMENT ADULT - PHARYNGEAL PHASE
Cough post oral intake/Cough x1 noted immediately following consecutive sips of large volume of thin liquids, suggestive of aspiration. No overt signs of aspiration noted following small single sips of thin liquids. Hyolaryngeal elevation consistently appreciated upon palpation.

## 2020-02-18 NOTE — SWALLOW BEDSIDE ASSESSMENT ADULT - ASR SWALLOW ASPIRATION MONITOR
oral hygiene/fever/pneumonia/position upright (90Y)/cough/throat clearing/change of breathing pattern/gurgly voice/upper respiratory infection

## 2020-02-18 NOTE — PATIENT PROFILE ADULT - PSYCHOSOCIAL CONCERNS - OTHER
Patient's wife Constance is his caregiver.  She was recently diagnosed with breast cancer and will need help for him at home while she starts treatment.

## 2020-02-19 ENCOUNTER — TRANSCRIPTION ENCOUNTER (OUTPATIENT)
Age: 70
End: 2020-02-19

## 2020-02-19 VITALS
HEART RATE: 71 BPM | SYSTOLIC BLOOD PRESSURE: 106 MMHG | TEMPERATURE: 99 F | RESPIRATION RATE: 18 BRPM | OXYGEN SATURATION: 93 % | DIASTOLIC BLOOD PRESSURE: 63 MMHG

## 2020-02-19 LAB
ANION GAP SERPL CALC-SCNC: 11 MMOL/L — SIGNIFICANT CHANGE UP (ref 5–17)
BASOPHILS # BLD AUTO: 0.04 K/UL — SIGNIFICANT CHANGE UP (ref 0–0.2)
BASOPHILS NFR BLD AUTO: 0.3 % — SIGNIFICANT CHANGE UP (ref 0–2)
BUN SERPL-MCNC: 26 MG/DL — HIGH (ref 7–23)
CALCIUM SERPL-MCNC: 9.1 MG/DL — SIGNIFICANT CHANGE UP (ref 8.4–10.5)
CHLORIDE SERPL-SCNC: 108 MMOL/L — SIGNIFICANT CHANGE UP (ref 96–108)
CO2 SERPL-SCNC: 23 MMOL/L — SIGNIFICANT CHANGE UP (ref 22–31)
CREAT SERPL-MCNC: 0.67 MG/DL — SIGNIFICANT CHANGE UP (ref 0.5–1.3)
CULTURE RESULTS: NO GROWTH — SIGNIFICANT CHANGE UP
EOSINOPHIL # BLD AUTO: 0.53 K/UL — HIGH (ref 0–0.5)
EOSINOPHIL NFR BLD AUTO: 4.6 % — SIGNIFICANT CHANGE UP (ref 0–6)
GLUCOSE BLDC GLUCOMTR-MCNC: 137 MG/DL — HIGH (ref 70–99)
GLUCOSE SERPL-MCNC: 144 MG/DL — HIGH (ref 70–99)
HCT VFR BLD CALC: 27.6 % — LOW (ref 39–50)
HGB BLD-MCNC: 9 G/DL — LOW (ref 13–17)
IMM GRANULOCYTES NFR BLD AUTO: 0.4 % — SIGNIFICANT CHANGE UP (ref 0–1.5)
LEVETIRACETAM SERPL-MCNC: 17.7 MCG/ML — SIGNIFICANT CHANGE UP (ref 12–46)
LYMPHOCYTES # BLD AUTO: 1.96 K/UL — SIGNIFICANT CHANGE UP (ref 1–3.3)
LYMPHOCYTES # BLD AUTO: 16.9 % — SIGNIFICANT CHANGE UP (ref 13–44)
MAGNESIUM SERPL-MCNC: 1.4 MG/DL — LOW (ref 1.6–2.6)
MCHC RBC-ENTMCNC: 30.5 PG — SIGNIFICANT CHANGE UP (ref 27–34)
MCHC RBC-ENTMCNC: 32.6 GM/DL — SIGNIFICANT CHANGE UP (ref 32–36)
MCV RBC AUTO: 93.6 FL — SIGNIFICANT CHANGE UP (ref 80–100)
MONOCYTES # BLD AUTO: 1.12 K/UL — HIGH (ref 0–0.9)
MONOCYTES NFR BLD AUTO: 9.6 % — SIGNIFICANT CHANGE UP (ref 2–14)
NEUTROPHILS # BLD AUTO: 7.93 K/UL — HIGH (ref 1.8–7.4)
NEUTROPHILS NFR BLD AUTO: 68.2 % — SIGNIFICANT CHANGE UP (ref 43–77)
NRBC # BLD: 0 /100 WBCS — SIGNIFICANT CHANGE UP (ref 0–0)
PLATELET # BLD AUTO: 258 K/UL — SIGNIFICANT CHANGE UP (ref 150–400)
POTASSIUM SERPL-MCNC: 4.1 MMOL/L — SIGNIFICANT CHANGE UP (ref 3.5–5.3)
POTASSIUM SERPL-SCNC: 4.1 MMOL/L — SIGNIFICANT CHANGE UP (ref 3.5–5.3)
RBC # BLD: 2.95 M/UL — LOW (ref 4.2–5.8)
RBC # FLD: 14.2 % — SIGNIFICANT CHANGE UP (ref 10.3–14.5)
SODIUM SERPL-SCNC: 142 MMOL/L — SIGNIFICANT CHANGE UP (ref 135–145)
SPECIMEN SOURCE: SIGNIFICANT CHANGE UP
WBC # BLD: 11.63 K/UL — HIGH (ref 3.8–10.5)
WBC # FLD AUTO: 11.63 K/UL — HIGH (ref 3.8–10.5)

## 2020-02-19 PROCEDURE — 83540 ASSAY OF IRON: CPT

## 2020-02-19 PROCEDURE — 83735 ASSAY OF MAGNESIUM: CPT

## 2020-02-19 PROCEDURE — 95710 EEG W/O VID EA 12-26HR CONT: CPT

## 2020-02-19 PROCEDURE — 87449 NOS EACH ORGANISM AG IA: CPT

## 2020-02-19 PROCEDURE — 80048 BASIC METABOLIC PNL TOTAL CA: CPT

## 2020-02-19 PROCEDURE — 95722 EEG PHY/QHP>36<60 HR W/VEEG: CPT

## 2020-02-19 PROCEDURE — 81001 URINALYSIS AUTO W/SCOPE: CPT

## 2020-02-19 PROCEDURE — 83036 HEMOGLOBIN GLYCOSYLATED A1C: CPT

## 2020-02-19 PROCEDURE — 70450 CT HEAD/BRAIN W/O DYE: CPT

## 2020-02-19 PROCEDURE — 84484 ASSAY OF TROPONIN QUANT: CPT

## 2020-02-19 PROCEDURE — 83605 ASSAY OF LACTIC ACID: CPT

## 2020-02-19 PROCEDURE — 84145 PROCALCITONIN (PCT): CPT

## 2020-02-19 PROCEDURE — 87086 URINE CULTURE/COLONY COUNT: CPT

## 2020-02-19 PROCEDURE — 96365 THER/PROPH/DIAG IV INF INIT: CPT

## 2020-02-19 PROCEDURE — 99239 HOSP IP/OBS DSCHRG MGMT >30: CPT | Mod: GC

## 2020-02-19 PROCEDURE — 97161 PT EVAL LOW COMPLEX 20 MIN: CPT

## 2020-02-19 PROCEDURE — 92610 EVALUATE SWALLOWING FUNCTION: CPT

## 2020-02-19 PROCEDURE — 83880 ASSAY OF NATRIURETIC PEPTIDE: CPT

## 2020-02-19 PROCEDURE — 93005 ELECTROCARDIOGRAM TRACING: CPT

## 2020-02-19 PROCEDURE — 83550 IRON BINDING TEST: CPT

## 2020-02-19 PROCEDURE — 80177 DRUG SCRN QUAN LEVETIRACETAM: CPT

## 2020-02-19 PROCEDURE — 85610 PROTHROMBIN TIME: CPT

## 2020-02-19 PROCEDURE — 87631 RESP VIRUS 3-5 TARGETS: CPT

## 2020-02-19 PROCEDURE — 85025 COMPLETE CBC W/AUTO DIFF WBC: CPT

## 2020-02-19 PROCEDURE — 36415 COLL VENOUS BLD VENIPUNCTURE: CPT

## 2020-02-19 PROCEDURE — 85027 COMPLETE CBC AUTOMATED: CPT

## 2020-02-19 PROCEDURE — 71045 X-RAY EXAM CHEST 1 VIEW: CPT

## 2020-02-19 PROCEDURE — 80053 COMPREHEN METABOLIC PANEL: CPT

## 2020-02-19 PROCEDURE — 82962 GLUCOSE BLOOD TEST: CPT

## 2020-02-19 PROCEDURE — 99285 EMERGENCY DEPT VISIT HI MDM: CPT | Mod: 25

## 2020-02-19 PROCEDURE — 85730 THROMBOPLASTIN TIME PARTIAL: CPT

## 2020-02-19 PROCEDURE — 87040 BLOOD CULTURE FOR BACTERIA: CPT

## 2020-02-19 PROCEDURE — 82728 ASSAY OF FERRITIN: CPT

## 2020-02-19 RX ORDER — MAGNESIUM SULFATE 500 MG/ML
4 VIAL (ML) INJECTION ONCE
Refills: 0 | Status: COMPLETED | OUTPATIENT
Start: 2020-02-19 | End: 2020-02-19

## 2020-02-19 RX ADMIN — MIDODRINE HYDROCHLORIDE 5 MILLIGRAM(S): 2.5 TABLET ORAL at 05:46

## 2020-02-19 RX ADMIN — RISPERIDONE 1 MILLIGRAM(S): 4 TABLET ORAL at 05:46

## 2020-02-19 RX ADMIN — AMPICILLIN SODIUM AND SULBACTAM SODIUM 200 GRAM(S): 250; 125 INJECTION, POWDER, FOR SUSPENSION INTRAMUSCULAR; INTRAVENOUS at 05:43

## 2020-02-19 RX ADMIN — APIXABAN 5 MILLIGRAM(S): 2.5 TABLET, FILM COATED ORAL at 05:46

## 2020-02-19 RX ADMIN — LEVETIRACETAM 250 MILLIGRAM(S): 250 TABLET, FILM COATED ORAL at 09:45

## 2020-02-19 RX ADMIN — Medication 5000 UNIT(S): at 03:27

## 2020-02-19 RX ADMIN — Medication 100 GRAM(S): at 09:44

## 2020-02-19 NOTE — DISCHARGE NOTE NURSING/CASE MANAGEMENT/SOCIAL WORK - PATIENT PORTAL LINK FT
You can access the FollowMyHealth Patient Portal offered by [facility] by registering at the following website: [email or web address]. By joining [employer]’s FollowMyHealth portal, you will also be able to view your health information using other applications (apps) compatible with our system.

## 2020-02-19 NOTE — DISCHARGE NOTE NURSING/CASE MANAGEMENT/SOCIAL WORK - NSDCFUADDAPPT_GEN_ALL_CORE_FT
Please schedule a follow up appointment with your PCP on discharge.   Please follow up with the neurologist, either Dr. Longoria or Dr. Monterroso, within 4 wks after discharge.
Attending Attestation (For Attendings USE Only)...

## 2020-02-22 LAB
CULTURE RESULTS: SIGNIFICANT CHANGE UP
CULTURE RESULTS: SIGNIFICANT CHANGE UP
SPECIMEN SOURCE: SIGNIFICANT CHANGE UP
SPECIMEN SOURCE: SIGNIFICANT CHANGE UP

## 2020-02-24 DIAGNOSIS — E86.0 DEHYDRATION: ICD-10-CM

## 2020-02-24 DIAGNOSIS — I95.1 ORTHOSTATIC HYPOTENSION: ICD-10-CM

## 2020-02-24 DIAGNOSIS — G40.909 EPILEPSY, UNSPECIFIED, NOT INTRACTABLE, WITHOUT STATUS EPILEPTICUS: ICD-10-CM

## 2020-02-24 DIAGNOSIS — R32 UNSPECIFIED URINARY INCONTINENCE: ICD-10-CM

## 2020-02-24 DIAGNOSIS — J69.0 PNEUMONITIS DUE TO INHALATION OF FOOD AND VOMIT: ICD-10-CM

## 2020-02-24 DIAGNOSIS — E11.65 TYPE 2 DIABETES MELLITUS WITH HYPERGLYCEMIA: ICD-10-CM

## 2020-02-24 DIAGNOSIS — E11.40 TYPE 2 DIABETES MELLITUS WITH DIABETIC NEUROPATHY, UNSPECIFIED: ICD-10-CM

## 2020-02-24 DIAGNOSIS — Z79.4 LONG TERM (CURRENT) USE OF INSULIN: ICD-10-CM

## 2020-02-24 DIAGNOSIS — Z99.3 DEPENDENCE ON WHEELCHAIR: ICD-10-CM

## 2020-02-24 DIAGNOSIS — N17.9 ACUTE KIDNEY FAILURE, UNSPECIFIED: ICD-10-CM

## 2020-02-24 DIAGNOSIS — R79.89 OTHER SPECIFIED ABNORMAL FINDINGS OF BLOOD CHEMISTRY: ICD-10-CM

## 2020-02-24 DIAGNOSIS — I48.91 UNSPECIFIED ATRIAL FIBRILLATION: ICD-10-CM

## 2020-02-24 DIAGNOSIS — Z96.7 PRESENCE OF OTHER BONE AND TENDON IMPLANTS: ICD-10-CM

## 2020-02-24 DIAGNOSIS — F32.9 MAJOR DEPRESSIVE DISORDER, SINGLE EPISODE, UNSPECIFIED: ICD-10-CM

## 2020-02-24 DIAGNOSIS — Z79.01 LONG TERM (CURRENT) USE OF ANTICOAGULANTS: ICD-10-CM

## 2020-03-11 ENCOUNTER — INPATIENT (INPATIENT)
Facility: HOSPITAL | Age: 70
LOS: 1 days | Discharge: HOME | DRG: 871 | End: 2020-03-13
Attending: STUDENT IN AN ORGANIZED HEALTH CARE EDUCATION/TRAINING PROGRAM | Admitting: STUDENT IN AN ORGANIZED HEALTH CARE EDUCATION/TRAINING PROGRAM
Payer: MEDICARE

## 2020-03-11 VITALS
SYSTOLIC BLOOD PRESSURE: 75 MMHG | HEART RATE: 118 BPM | TEMPERATURE: 101 F | DIASTOLIC BLOOD PRESSURE: 54 MMHG | OXYGEN SATURATION: 86 % | RESPIRATION RATE: 16 BRPM

## 2020-03-11 DIAGNOSIS — Z96.7 PRESENCE OF OTHER BONE AND TENDON IMPLANTS: Chronic | ICD-10-CM

## 2020-03-11 LAB
ALBUMIN SERPL ELPH-MCNC: 3.2 G/DL — LOW (ref 3.3–5)
ALP SERPL-CCNC: 84 U/L — SIGNIFICANT CHANGE UP (ref 40–120)
ALT FLD-CCNC: 10 U/L — SIGNIFICANT CHANGE UP (ref 10–45)
ANION GAP SERPL CALC-SCNC: 15 MMOL/L — SIGNIFICANT CHANGE UP (ref 5–17)
APPEARANCE UR: CLEAR — SIGNIFICANT CHANGE UP
APTT BLD: 30.3 SEC — SIGNIFICANT CHANGE UP (ref 27.5–36.3)
AST SERPL-CCNC: 10 U/L — SIGNIFICANT CHANGE UP (ref 10–40)
BASOPHILS # BLD AUTO: 0.16 K/UL — SIGNIFICANT CHANGE UP (ref 0–0.2)
BASOPHILS NFR BLD AUTO: 0.9 % — SIGNIFICANT CHANGE UP (ref 0–2)
BILIRUB SERPL-MCNC: 0.6 MG/DL — SIGNIFICANT CHANGE UP (ref 0.2–1.2)
BILIRUB UR-MCNC: NEGATIVE — SIGNIFICANT CHANGE UP
BUN SERPL-MCNC: 28 MG/DL — HIGH (ref 7–23)
CALCIUM SERPL-MCNC: 10.2 MG/DL — SIGNIFICANT CHANGE UP (ref 8.4–10.5)
CHLORIDE SERPL-SCNC: 98 MMOL/L — SIGNIFICANT CHANGE UP (ref 96–108)
CO2 SERPL-SCNC: 24 MMOL/L — SIGNIFICANT CHANGE UP (ref 22–31)
COLOR SPEC: YELLOW — SIGNIFICANT CHANGE UP
CREAT SERPL-MCNC: 0.71 MG/DL — SIGNIFICANT CHANGE UP (ref 0.5–1.3)
DIFF PNL FLD: NEGATIVE — SIGNIFICANT CHANGE UP
EOSINOPHIL # BLD AUTO: 0 K/UL — SIGNIFICANT CHANGE UP (ref 0–0.5)
EOSINOPHIL NFR BLD AUTO: 0 % — SIGNIFICANT CHANGE UP (ref 0–6)
GAS PNL BLDV: SIGNIFICANT CHANGE UP
GLUCOSE SERPL-MCNC: 278 MG/DL — HIGH (ref 70–99)
GLUCOSE UR QL: NEGATIVE — SIGNIFICANT CHANGE UP
HCT VFR BLD CALC: 34.4 % — LOW (ref 39–50)
HGB BLD-MCNC: 11.1 G/DL — LOW (ref 13–17)
INR BLD: 1.53 — HIGH (ref 0.88–1.16)
KETONES UR-MCNC: NEGATIVE — SIGNIFICANT CHANGE UP
LACTATE SERPL-SCNC: 1.7 MMOL/L — SIGNIFICANT CHANGE UP (ref 0.5–2)
LACTATE SERPL-SCNC: 3.4 MMOL/L — HIGH (ref 0.5–2)
LEUKOCYTE ESTERASE UR-ACNC: NEGATIVE — SIGNIFICANT CHANGE UP
LYMPHOCYTES # BLD AUTO: 0.45 K/UL — LOW (ref 1–3.3)
LYMPHOCYTES # BLD AUTO: 2.6 % — LOW (ref 13–44)
MCHC RBC-ENTMCNC: 30.2 PG — SIGNIFICANT CHANGE UP (ref 27–34)
MCHC RBC-ENTMCNC: 32.3 GM/DL — SIGNIFICANT CHANGE UP (ref 32–36)
MCV RBC AUTO: 93.5 FL — SIGNIFICANT CHANGE UP (ref 80–100)
MONOCYTES # BLD AUTO: 0.59 K/UL — SIGNIFICANT CHANGE UP (ref 0–0.9)
MONOCYTES NFR BLD AUTO: 3.4 % — SIGNIFICANT CHANGE UP (ref 2–14)
NEUTROPHILS # BLD AUTO: 15.62 K/UL — HIGH (ref 1.8–7.4)
NEUTROPHILS NFR BLD AUTO: 76.7 % — SIGNIFICANT CHANGE UP (ref 43–77)
NITRITE UR-MCNC: NEGATIVE — SIGNIFICANT CHANGE UP
PH UR: 6 — SIGNIFICANT CHANGE UP (ref 5–8)
PLATELET # BLD AUTO: 264 K/UL — SIGNIFICANT CHANGE UP (ref 150–400)
POTASSIUM SERPL-MCNC: 4.5 MMOL/L — SIGNIFICANT CHANGE UP (ref 3.5–5.3)
POTASSIUM SERPL-SCNC: 4.5 MMOL/L — SIGNIFICANT CHANGE UP (ref 3.5–5.3)
PROT SERPL-MCNC: 7.2 G/DL — SIGNIFICANT CHANGE UP (ref 6–8.3)
PROT UR-MCNC: 30 MG/DL
PROTHROM AB SERPL-ACNC: 17.7 SEC — HIGH (ref 10–12.9)
RAPID RVP RESULT: SIGNIFICANT CHANGE UP
RBC # BLD: 3.68 M/UL — LOW (ref 4.2–5.8)
RBC # FLD: 13.2 % — SIGNIFICANT CHANGE UP (ref 10.3–14.5)
SODIUM SERPL-SCNC: 137 MMOL/L — SIGNIFICANT CHANGE UP (ref 135–145)
SP GR SPEC: 1.02 — SIGNIFICANT CHANGE UP (ref 1–1.03)
UROBILINOGEN FLD QL: 0.2 E.U./DL — SIGNIFICANT CHANGE UP
WBC # BLD: 17.26 K/UL — HIGH (ref 3.8–10.5)
WBC # FLD AUTO: 17.26 K/UL — HIGH (ref 3.8–10.5)

## 2020-03-11 PROCEDURE — 71045 X-RAY EXAM CHEST 1 VIEW: CPT | Mod: 26

## 2020-03-11 PROCEDURE — 93010 ELECTROCARDIOGRAM REPORT: CPT

## 2020-03-11 PROCEDURE — 99291 CRITICAL CARE FIRST HOUR: CPT

## 2020-03-11 PROCEDURE — 71275 CT ANGIOGRAPHY CHEST: CPT | Mod: 26

## 2020-03-11 RX ORDER — SODIUM CHLORIDE 9 MG/ML
1000 INJECTION INTRAMUSCULAR; INTRAVENOUS; SUBCUTANEOUS ONCE
Refills: 0 | Status: COMPLETED | OUTPATIENT
Start: 2020-03-11 | End: 2020-03-11

## 2020-03-11 RX ORDER — CEFTRIAXONE 500 MG/1
1000 INJECTION, POWDER, FOR SOLUTION INTRAMUSCULAR; INTRAVENOUS ONCE
Refills: 0 | Status: COMPLETED | OUTPATIENT
Start: 2020-03-11 | End: 2020-03-11

## 2020-03-11 RX ORDER — AZITHROMYCIN 500 MG/1
500 TABLET, FILM COATED ORAL ONCE
Refills: 0 | Status: DISCONTINUED | OUTPATIENT
Start: 2020-03-11 | End: 2020-03-12

## 2020-03-11 RX ORDER — SODIUM CHLORIDE 9 MG/ML
2000 INJECTION INTRAMUSCULAR; INTRAVENOUS; SUBCUTANEOUS ONCE
Refills: 0 | Status: COMPLETED | OUTPATIENT
Start: 2020-03-11 | End: 2020-03-11

## 2020-03-11 RX ORDER — ACETAMINOPHEN 500 MG
975 TABLET ORAL ONCE
Refills: 0 | Status: COMPLETED | OUTPATIENT
Start: 2020-03-11 | End: 2020-03-11

## 2020-03-11 RX ADMIN — SODIUM CHLORIDE 2000 MILLILITER(S): 9 INJECTION INTRAMUSCULAR; INTRAVENOUS; SUBCUTANEOUS at 18:13

## 2020-03-11 RX ADMIN — SODIUM CHLORIDE 2000 MILLILITER(S): 9 INJECTION INTRAMUSCULAR; INTRAVENOUS; SUBCUTANEOUS at 21:53

## 2020-03-11 RX ADMIN — Medication 975 MILLIGRAM(S): at 18:43

## 2020-03-11 RX ADMIN — Medication 975 MILLIGRAM(S): at 21:53

## 2020-03-11 RX ADMIN — CEFTRIAXONE 100 MILLIGRAM(S): 500 INJECTION, POWDER, FOR SOLUTION INTRAMUSCULAR; INTRAVENOUS at 18:22

## 2020-03-11 NOTE — ED PROVIDER NOTE - PHYSICAL EXAMINATION
CONSTITUTIONAL: , awake, alert and in no apparent distress.  HEENT: Head is atraumatic. Eyes clear bilaterally, normal EOMI. Airway patent.  CARDIAC: Normal rate, regular rhythm.  Heart sounds S1, S2.   RESPIRATORY: Breath sounds clear and equal bilaterally. no tachypnea, respiratory distress.   GASTROINTESTINAL: Abdomen soft, non-tender, no guarding, distension.  MUSCULOSKELETAL: Spine appears normal, no midline spinal tenderness, range of motion is not limited, no muscle or joint tenderness. no bony tenderness. no JVD, peripheral edema.   NEUROLOGICAL: Alert and oriented,  SKIN: Skin normal color for race, warm, dry and intact. No evidence of rash.  PSYCHIATRIC:  no apparent risk to self or others.

## 2020-03-11 NOTE — ED ADULT NURSE REASSESSMENT NOTE - NS ED NURSE REASSESS COMMENT FT1
Received pt in no acute distress, a&ox2, to name and place, pt on cardiac monitor, O2 sat and bp cuff, v/s wnl. Pt denies any chest pain, sob, palpitations and dizziness. Asymptomatic at this time. Pt with 18g heplock to LAC, intact, no edema or redness noted. Pt pending Ct scan and MD dispo. Pt otherwise comfortable and assisted with all needs. Denies any c/o at this time. Will monitor.

## 2020-03-11 NOTE — ED PROVIDER NOTE - CRITICAL CARE PROVIDED
conducted a detailed discussion of DNR status/additional history taking/consult w/ pt's family directly relating to pts condition/direct patient care (not related to procedure)/documentation

## 2020-03-11 NOTE — ED PROVIDER NOTE - OBJECTIVE STATEMENT
hx of DM, seizures presenting to ED after wife found him lethargic, pale, sob for one day. Pt poor historian per EMS, pt in ED on triage, hypotensive, hypoxic. Recent admission for seizure, pna. Wife, Constance Sigala  states no recent travel abroad, sick contacts, has been at home since discharge. Denies f/c, NVD, black/bloody stool, urinary complaints, focal weakness/numbness, lightheadedness, back pain, testicular/penile pain, rashes, recent travel, recent antibiotics, sick contacts, SOB/CP,

## 2020-03-11 NOTE — ED PROVIDER NOTE - CLINICAL SUMMARY MEDICAL DECISION MAKING FREE TEXT BOX
Pt made sepsis code for above findings, consider pna/URI/PE, other, will obtain labs, CTA. Pt w no recent travel, sick contact covid exposure. Empiric abx, ivf, reassess.

## 2020-03-12 DIAGNOSIS — A41.9 SEPSIS, UNSPECIFIED ORGANISM: ICD-10-CM

## 2020-03-12 DIAGNOSIS — I48.91 UNSPECIFIED ATRIAL FIBRILLATION: ICD-10-CM

## 2020-03-12 DIAGNOSIS — G40.909 EPILEPSY, UNSPECIFIED, NOT INTRACTABLE, WITHOUT STATUS EPILEPTICUS: ICD-10-CM

## 2020-03-12 DIAGNOSIS — E11.69 TYPE 2 DIABETES MELLITUS WITH OTHER SPECIFIED COMPLICATION: ICD-10-CM

## 2020-03-12 DIAGNOSIS — Z29.9 ENCOUNTER FOR PROPHYLACTIC MEASURES, UNSPECIFIED: ICD-10-CM

## 2020-03-12 DIAGNOSIS — I95.1 ORTHOSTATIC HYPOTENSION: ICD-10-CM

## 2020-03-12 DIAGNOSIS — R63.8 OTHER SYMPTOMS AND SIGNS CONCERNING FOOD AND FLUID INTAKE: ICD-10-CM

## 2020-03-12 DIAGNOSIS — D64.9 ANEMIA, UNSPECIFIED: ICD-10-CM

## 2020-03-12 DIAGNOSIS — J15.6 PNEUMONIA DUE TO OTHER GRAM-NEGATIVE BACTERIA: ICD-10-CM

## 2020-03-12 LAB
ANION GAP SERPL CALC-SCNC: 12 MMOL/L — SIGNIFICANT CHANGE UP (ref 5–17)
BASOPHILS # BLD AUTO: 0.06 K/UL — SIGNIFICANT CHANGE UP (ref 0–0.2)
BASOPHILS NFR BLD AUTO: 0.3 % — SIGNIFICANT CHANGE UP (ref 0–2)
BUN SERPL-MCNC: 31 MG/DL — HIGH (ref 7–23)
CALCIUM SERPL-MCNC: 9.9 MG/DL — SIGNIFICANT CHANGE UP (ref 8.4–10.5)
CHLORIDE SERPL-SCNC: 104 MMOL/L — SIGNIFICANT CHANGE UP (ref 96–108)
CO2 SERPL-SCNC: 23 MMOL/L — SIGNIFICANT CHANGE UP (ref 22–31)
CREAT SERPL-MCNC: 0.71 MG/DL — SIGNIFICANT CHANGE UP (ref 0.5–1.3)
EOSINOPHIL # BLD AUTO: 0.55 K/UL — HIGH (ref 0–0.5)
EOSINOPHIL NFR BLD AUTO: 2.8 % — SIGNIFICANT CHANGE UP (ref 0–6)
GLUCOSE BLDC GLUCOMTR-MCNC: 138 MG/DL — HIGH (ref 70–99)
GLUCOSE BLDC GLUCOMTR-MCNC: 145 MG/DL — HIGH (ref 70–99)
GLUCOSE BLDC GLUCOMTR-MCNC: 165 MG/DL — HIGH (ref 70–99)
GLUCOSE BLDC GLUCOMTR-MCNC: 200 MG/DL — HIGH (ref 70–99)
GLUCOSE SERPL-MCNC: 137 MG/DL — HIGH (ref 70–99)
HCT VFR BLD CALC: 31.1 % — LOW (ref 39–50)
HGB BLD-MCNC: 10.1 G/DL — LOW (ref 13–17)
IMM GRANULOCYTES NFR BLD AUTO: 0.6 % — SIGNIFICANT CHANGE UP (ref 0–1.5)
LYMPHOCYTES # BLD AUTO: 10.6 % — LOW (ref 13–44)
LYMPHOCYTES # BLD AUTO: 2.09 K/UL — SIGNIFICANT CHANGE UP (ref 1–3.3)
MAGNESIUM SERPL-MCNC: 1.6 MG/DL — SIGNIFICANT CHANGE UP (ref 1.6–2.6)
MCHC RBC-ENTMCNC: 30.3 PG — SIGNIFICANT CHANGE UP (ref 27–34)
MCHC RBC-ENTMCNC: 32.5 GM/DL — SIGNIFICANT CHANGE UP (ref 32–36)
MCV RBC AUTO: 93.4 FL — SIGNIFICANT CHANGE UP (ref 80–100)
MONOCYTES # BLD AUTO: 1.24 K/UL — HIGH (ref 0–0.9)
MONOCYTES NFR BLD AUTO: 6.3 % — SIGNIFICANT CHANGE UP (ref 2–14)
MRSA PCR RESULT.: NEGATIVE — SIGNIFICANT CHANGE UP
MRSA PCR RESULT.: NEGATIVE — SIGNIFICANT CHANGE UP
NEUTROPHILS # BLD AUTO: 15.66 K/UL — HIGH (ref 1.8–7.4)
NEUTROPHILS NFR BLD AUTO: 79.4 % — HIGH (ref 43–77)
NRBC # BLD: 0 /100 WBCS — SIGNIFICANT CHANGE UP (ref 0–0)
PHOSPHATE SERPL-MCNC: 3.9 MG/DL — SIGNIFICANT CHANGE UP (ref 2.5–4.5)
PLATELET # BLD AUTO: 245 K/UL — SIGNIFICANT CHANGE UP (ref 150–400)
POTASSIUM SERPL-MCNC: 4.5 MMOL/L — SIGNIFICANT CHANGE UP (ref 3.5–5.3)
POTASSIUM SERPL-SCNC: 4.5 MMOL/L — SIGNIFICANT CHANGE UP (ref 3.5–5.3)
PROCALCITONIN SERPL-MCNC: 0.49 NG/ML — HIGH (ref 0.02–0.1)
RBC # BLD: 3.33 M/UL — LOW (ref 4.2–5.8)
RBC # FLD: 13.6 % — SIGNIFICANT CHANGE UP (ref 10.3–14.5)
S AUREUS DNA NOSE QL NAA+PROBE: NEGATIVE — SIGNIFICANT CHANGE UP
S AUREUS DNA NOSE QL NAA+PROBE: NEGATIVE — SIGNIFICANT CHANGE UP
SARS-COV-2 RNA SPEC QL NAA+PROBE: SIGNIFICANT CHANGE UP
SODIUM SERPL-SCNC: 139 MMOL/L — SIGNIFICANT CHANGE UP (ref 135–145)
WBC # BLD: 19.71 K/UL — HIGH (ref 3.8–10.5)
WBC # FLD AUTO: 19.71 K/UL — HIGH (ref 3.8–10.5)

## 2020-03-12 PROCEDURE — 99223 1ST HOSP IP/OBS HIGH 75: CPT | Mod: GC

## 2020-03-12 RX ORDER — LEVETIRACETAM 250 MG/1
250 TABLET, FILM COATED ORAL EVERY 12 HOURS
Refills: 0 | Status: DISCONTINUED | OUTPATIENT
Start: 2020-03-12 | End: 2020-03-13

## 2020-03-12 RX ORDER — VANCOMYCIN HCL 1 G
1000 VIAL (EA) INTRAVENOUS EVERY 12 HOURS
Refills: 0 | Status: DISCONTINUED | OUTPATIENT
Start: 2020-03-12 | End: 2020-03-12

## 2020-03-12 RX ORDER — PIPERACILLIN AND TAZOBACTAM 4; .5 G/20ML; G/20ML
4.5 INJECTION, POWDER, LYOPHILIZED, FOR SOLUTION INTRAVENOUS EVERY 6 HOURS
Refills: 0 | Status: DISCONTINUED | OUTPATIENT
Start: 2020-03-12 | End: 2020-03-13

## 2020-03-12 RX ORDER — DEXTROSE 50 % IN WATER 50 %
15 SYRINGE (ML) INTRAVENOUS ONCE
Refills: 0 | Status: DISCONTINUED | OUTPATIENT
Start: 2020-03-12 | End: 2020-03-13

## 2020-03-12 RX ORDER — DEXTROSE 50 % IN WATER 50 %
12.5 SYRINGE (ML) INTRAVENOUS ONCE
Refills: 0 | Status: DISCONTINUED | OUTPATIENT
Start: 2020-03-12 | End: 2020-03-13

## 2020-03-12 RX ORDER — MIDODRINE HYDROCHLORIDE 2.5 MG/1
5 TABLET ORAL EVERY 8 HOURS
Refills: 0 | Status: DISCONTINUED | OUTPATIENT
Start: 2020-03-12 | End: 2020-03-13

## 2020-03-12 RX ORDER — PIPERACILLIN AND TAZOBACTAM 4; .5 G/20ML; G/20ML
3.38 INJECTION, POWDER, LYOPHILIZED, FOR SOLUTION INTRAVENOUS ONCE
Refills: 0 | Status: COMPLETED | OUTPATIENT
Start: 2020-03-12 | End: 2020-03-12

## 2020-03-12 RX ORDER — ATORVASTATIN CALCIUM 80 MG/1
10 TABLET, FILM COATED ORAL AT BEDTIME
Refills: 0 | Status: DISCONTINUED | OUTPATIENT
Start: 2020-03-12 | End: 2020-03-13

## 2020-03-12 RX ORDER — GLUCAGON INJECTION, SOLUTION 0.5 MG/.1ML
1 INJECTION, SOLUTION SUBCUTANEOUS ONCE
Refills: 0 | Status: DISCONTINUED | OUTPATIENT
Start: 2020-03-12 | End: 2020-03-13

## 2020-03-12 RX ORDER — VANCOMYCIN HCL 1 G
1000 VIAL (EA) INTRAVENOUS ONCE
Refills: 0 | Status: DISCONTINUED | OUTPATIENT
Start: 2020-03-12 | End: 2020-03-12

## 2020-03-12 RX ORDER — PREGABALIN 225 MG/1
1000 CAPSULE ORAL DAILY
Refills: 0 | Status: DISCONTINUED | OUTPATIENT
Start: 2020-03-12 | End: 2020-03-13

## 2020-03-12 RX ORDER — DEXTROSE 50 % IN WATER 50 %
25 SYRINGE (ML) INTRAVENOUS ONCE
Refills: 0 | Status: DISCONTINUED | OUTPATIENT
Start: 2020-03-12 | End: 2020-03-13

## 2020-03-12 RX ORDER — CHOLECALCIFEROL (VITAMIN D3) 125 MCG
1000 CAPSULE ORAL DAILY
Refills: 0 | Status: DISCONTINUED | OUTPATIENT
Start: 2020-03-12 | End: 2020-03-13

## 2020-03-12 RX ORDER — APIXABAN 2.5 MG/1
5 TABLET, FILM COATED ORAL
Refills: 0 | Status: DISCONTINUED | OUTPATIENT
Start: 2020-03-12 | End: 2020-03-13

## 2020-03-12 RX ORDER — MAGNESIUM SULFATE 500 MG/ML
2 VIAL (ML) INJECTION ONCE
Refills: 0 | Status: COMPLETED | OUTPATIENT
Start: 2020-03-12 | End: 2020-03-12

## 2020-03-12 RX ORDER — SODIUM CHLORIDE 9 MG/ML
1000 INJECTION, SOLUTION INTRAVENOUS
Refills: 0 | Status: DISCONTINUED | OUTPATIENT
Start: 2020-03-12 | End: 2020-03-13

## 2020-03-12 RX ORDER — FOLIC ACID 0.8 MG
1 TABLET ORAL DAILY
Refills: 0 | Status: DISCONTINUED | OUTPATIENT
Start: 2020-03-12 | End: 2020-03-13

## 2020-03-12 RX ORDER — INSULIN LISPRO 100/ML
VIAL (ML) SUBCUTANEOUS
Refills: 0 | Status: DISCONTINUED | OUTPATIENT
Start: 2020-03-12 | End: 2020-03-13

## 2020-03-12 RX ORDER — RISPERIDONE 4 MG/1
1 TABLET ORAL EVERY 8 HOURS
Refills: 0 | Status: DISCONTINUED | OUTPATIENT
Start: 2020-03-12 | End: 2020-03-13

## 2020-03-12 RX ADMIN — MIDODRINE HYDROCHLORIDE 5 MILLIGRAM(S): 2.5 TABLET ORAL at 22:01

## 2020-03-12 RX ADMIN — APIXABAN 5 MILLIGRAM(S): 2.5 TABLET, FILM COATED ORAL at 18:29

## 2020-03-12 RX ADMIN — ATORVASTATIN CALCIUM 10 MILLIGRAM(S): 80 TABLET, FILM COATED ORAL at 22:01

## 2020-03-12 RX ADMIN — RISPERIDONE 1 MILLIGRAM(S): 4 TABLET ORAL at 16:00

## 2020-03-12 RX ADMIN — Medication 2: at 12:00

## 2020-03-12 RX ADMIN — PIPERACILLIN AND TAZOBACTAM 25 GRAM(S): 4; .5 INJECTION, POWDER, LYOPHILIZED, FOR SOLUTION INTRAVENOUS at 18:29

## 2020-03-12 RX ADMIN — APIXABAN 5 MILLIGRAM(S): 2.5 TABLET, FILM COATED ORAL at 02:51

## 2020-03-12 RX ADMIN — PIPERACILLIN AND TAZOBACTAM 25 GRAM(S): 4; .5 INJECTION, POWDER, LYOPHILIZED, FOR SOLUTION INTRAVENOUS at 13:28

## 2020-03-12 RX ADMIN — Medication 1000 UNIT(S): at 09:48

## 2020-03-12 RX ADMIN — LEVETIRACETAM 250 MILLIGRAM(S): 250 TABLET, FILM COATED ORAL at 02:52

## 2020-03-12 RX ADMIN — PIPERACILLIN AND TAZOBACTAM 200 GRAM(S): 4; .5 INJECTION, POWDER, LYOPHILIZED, FOR SOLUTION INTRAVENOUS at 00:45

## 2020-03-12 RX ADMIN — Medication 50 GRAM(S): at 10:54

## 2020-03-12 RX ADMIN — RISPERIDONE 1 MILLIGRAM(S): 4 TABLET ORAL at 10:54

## 2020-03-12 RX ADMIN — Medication 2: at 22:01

## 2020-03-12 RX ADMIN — PIPERACILLIN AND TAZOBACTAM 25 GRAM(S): 4; .5 INJECTION, POWDER, LYOPHILIZED, FOR SOLUTION INTRAVENOUS at 07:55

## 2020-03-12 RX ADMIN — MIDODRINE HYDROCHLORIDE 5 MILLIGRAM(S): 2.5 TABLET ORAL at 13:27

## 2020-03-12 RX ADMIN — LEVETIRACETAM 250 MILLIGRAM(S): 250 TABLET, FILM COATED ORAL at 18:33

## 2020-03-12 RX ADMIN — Medication 1 MILLIGRAM(S): at 09:49

## 2020-03-12 RX ADMIN — RISPERIDONE 1 MILLIGRAM(S): 4 TABLET ORAL at 02:51

## 2020-03-12 RX ADMIN — PREGABALIN 1000 MICROGRAM(S): 225 CAPSULE ORAL at 09:49

## 2020-03-12 RX ADMIN — MIDODRINE HYDROCHLORIDE 5 MILLIGRAM(S): 2.5 TABLET ORAL at 02:52

## 2020-03-12 RX ADMIN — Medication 250 MILLIGRAM(S): at 06:32

## 2020-03-12 NOTE — PROGRESS NOTE ADULT - ASSESSMENT
69M with a history of DM2 with neuropathy (wheelchair bound), seizure disorder on Keppra, anemia, orthostatic hypotension, AF on Eliquis, recent admission 2/17-19 for pna (?aspiration), and depression admitted with severe sepsis 2/2 pneumonia. Etiology of pneumonia likely HAP in setting of recent hospitalization vs viral, currently undergoing CoVID-19 rule out on RMF.    On admission, febrile (T100.7) and met 2/4 SIRS criteria (WBC17 w/ 13.8% bands, ).  Lactate 3.4  - Fever, tachycardia, hypotension with lactate 3.4.   - WWP. Lactate resolved after 3L NS  - Source: pneumonia. Mgmt of pneumonia as below (#2)  - f/u cultures 69M with a history of DM2 with neuropathy (wheelchair bound), seizure disorder (on Keppra), anemia, orthostatic hypotension, ?AF (on Eliquis), recent admission 2/17-19 for pna (?aspiration), and depression, BIBEMS p/w generalized weakness, lethargy, and SOB and admitted for severe sepsis 2/2 pneumonia. Etiology of pneumonia likely HAP in setting of recent hospitalization vs viral, currently undergoing CoVID-19 rule out on RMF.    · Recommended Consistencies	Mechanical soft w/ thins	  · Recommended Feeding/Eating Techniques	allow for swallow between intakes; maintain upright posture during/after eating for 30 mins; no straws; oral hygiene; position upright (90 degrees); small sips/bites; ***Small bites/sips, one at a time	    Speech and swallow consulted during previous admission (2/18) after pt endorsed some difficulty with swallowing and cough. At that time speech and swallow recommended mechanical soft diet w/ thin liquids.

## 2020-03-12 NOTE — H&P ADULT - ASSESSMENT
69M with a history of DM2 with neuropathy (wheelchair bound), seizure disorder on Keppra, anemia, orthostatic hypotension, AF on Eliquis, recent admission 2/17-19 for possible seizure + pna, and depression admitted with severe sepsis 2/2 pneumonia

## 2020-03-12 NOTE — PROGRESS NOTE ADULT - PROBLEM SELECTOR PLAN 6
- C/w home midodrine 5 TID Known orthostatic hypotension, on midodrine 5mg TID at home  - C/w home midodrine 5 TID

## 2020-03-12 NOTE — H&P ADULT - HISTORY OF PRESENT ILLNESS
Pt is a 69M with a history of DM2 with neuropathy (wheelchair bound), seizure disorder on Keppra, anemia, orthostatic hypotension, AF on Eliquis, recent admission 2/17-19 for possible seizure + pna, and depression p/w generalized weakness, lethargy and sob.    In the ED, T 100.7, , 75/54, RR 16, 86% on room air. Labs notable for WBC 17k, BUN 28, Cr 0.7, lactate 3.4, albumin 3.2. LFTs unremarkable. UA bland. RVP neg. EKG sinus tachycardia. CXR with bilateral infiltrates. CT with bilateral GGO. He received tylenol, azithro, rocephin, zosyn, 3L NS. Pt is a 69M with a history of DM2 with neuropathy (wheelchair bound), seizure disorder on Keppra, anemia, orthostatic hypotension, AF on Eliquis, recent admission 2/17-19 for possible seizure + pna, and depression p/w generalized weakness, lethargy and sob. Patient reports feeling subjective fevers and chills. Intermittent cough. Denies lightheadedness, CP, palpitations, abd pain, N/V. No rash or recent travel. Denies sick contacts.     In the ED, T 100.7, , 75/54, RR 16, 86% on room air. Labs notable for WBC 17k, BUN 28, Cr 0.7, lactate 3.4, albumin 3.2. LFTs unremarkable. UA bland. RVP neg. EKG sinus tachycardia. CXR with bilateral infiltrates. CT with bilateral GGO. He received tylenol, azithro, rocephin, zosyn, 3L NS.

## 2020-03-12 NOTE — PROGRESS NOTE ADULT - PROBLEM SELECTOR PLAN 8
F: maintenance NS until PO picks up.   E: replete prn  N: DASH, consistent carb diet F: encourage PO  E: replete to K>4, Mg>2  N:   Ppx:    Code Status:    Dispo:

## 2020-03-12 NOTE — ED ADULT NURSE REASSESSMENT NOTE - NS ED NURSE REASSESS COMMENT FT1
Informed by MD Long from [address] [place] [address] that pt meets criteria for COVID testing due to + CTA results. Pt denies any sob or cough. Face mask placed on pt, COVID swab sent to lab. ANM made aware and pt pending transport to unit.

## 2020-03-12 NOTE — H&P ADULT - PROBLEM SELECTOR PLAN 1
- Fever, tachycardia, hypotension with lactate 3.4.   - WWP. Lactate resolved after 3L NS  - Source: pneumonia. Mgmt of pneumonia as below (#2)  - f/u cultures

## 2020-03-12 NOTE — PROGRESS NOTE ADULT - SUBJECTIVE AND OBJECTIVE BOX
*** INCOMPLETE ***    OVERNIGHT EVENTS: As per overnight staff, there were no acute events overnight.     INTERVAL EVENTS:    SUBJECTIVE HPI: Patient seen and examined at bedside. Patient resting comfortably in bed. Pt endorses nonproductive cough, otherwise feeling well. Denies subjective fevers or chills, headaches,       VITAL SIGNS:  Vital Signs Last 24 Hrs  T(C): 36.6 (12 Mar 2020 12:42), Max: 38.8 (11 Mar 2020 18:07)  T(F): 97.8 (12 Mar 2020 12:42), Max: 101.8 (11 Mar 2020 18:07)  HR: 77 (12 Mar 2020 12:42) (77 - 118)  BP: 111/68 (12 Mar 2020 12:42) (75/54 - 111/68)  BP(mean): --  RR: 16 (12 Mar 2020 12:42) (16 - 16)  SpO2: 99% (12 Mar 2020 12:42) (86% - 99%)      20 @ 07:01  -  20 @ 14:20  --------------------------------------------------------  IN: 120 mL / OUT: 400 mL / NET: -280 mL      PHYSICAL EXAM:  General: Comfortable, pleasant, thin, non-toxic appearing, NAD  Neurological: AAOx3  HEENT: NC/AT; MMM  Neck: supple  Cardiovascular: +S1/S2, no murmurs/rubs/gallops, RRR  Respiratory: CTA in anterior lung fields, diminished breath sounds in lateral lung fields, no increased work of breathing or accessory muscle use, bibasilar egophony  Gastrointestinal: soft, NT/ND; BSx4 quadrants  Genitourinary: no suprapubic tenderness  Extremities: WWP; no edema  Vascular: 2+ radial, DP/PT pulses B/L      MEDICATIONS:  MEDICATIONS  (STANDING):  apixaban 5 milliGRAM(s) Oral two times a day  atorvastatin 10 milliGRAM(s) Oral at bedtime  cholecalciferol 1000 Unit(s) Oral daily  cyanocobalamin 1000 MICROGram(s) Oral daily  dextrose 5%. 1000 milliLiter(s) (50 mL/Hr) IV Continuous <Continuous>  dextrose 50% Injectable 12.5 Gram(s) IV Push once  dextrose 50% Injectable 25 Gram(s) IV Push once  dextrose 50% Injectable 25 Gram(s) IV Push once  folic acid 1 milliGRAM(s) Oral daily  insulin lispro (HumaLOG) corrective regimen sliding scale   SubCutaneous Before meals and at bedtime  levETIRAcetam 250 milliGRAM(s) Oral every 12 hours  midodrine. 5 milliGRAM(s) Oral every 8 hours  piperacillin/tazobactam IVPB.. 4.5 Gram(s) IV Intermittent every 6 hours  risperiDONE   Tablet 1 milliGRAM(s) Oral every 8 hours    MEDICATIONS  (PRN):  dextrose 40% Gel 15 Gram(s) Oral once PRN Blood Glucose LESS THAN 70 milliGRAM(s)/deciliter  glucagon  Injectable 1 milliGRAM(s) IntraMuscular once PRN Glucose LESS THAN 70 milligrams/deciliter      ALLERGIES/INTOLERANCES:  Allergies: Allergy Status Unknown  Intolerances: codeine (Other)      LABS:                        10.1   19.71 )-----------( 245      ( 12 Mar 2020 06:52 )             31.1     03-12    139  |  104  |  31<H>  ----------------------------<  137<H>  4.5   |  23  |  0.71    Ca    9.9      12 Mar 2020 06:52  Phos  3.9     03-12  Mg     1.6     03-12    TPro  7.2  /  Alb  3.2<L>  /  TBili  0.6  /  DBili  x   /  AST  10  /  ALT  10  /  AlkPhos  84  03-11    PT/INR - ( 11 Mar 2020 18:08 )   PT: 17.7 sec;   INR: 1.53     PTT - ( 11 Mar 2020 18:08 )  PTT:30.3 sec    CAPILLARY BLOOD GLUCOSE  POCT Blood Glucose.: 200 mg/dL (12 Mar 2020 11:53)      Urinalysis Basic - ( 11 Mar 2020 18:16 )  Color: Yellow / Appearance: Clear / S.025 / pH: x  Gluc: x / Ketone: NEGATIVE  / Bili: Negative / Urobili: 0.2 E.U./dL   Blood: x / Protein: 30 mg/dL / Nitrite: NEGATIVE   Leuk Esterase: NEGATIVE / RBC: < 5 /HPF / WBC < 5 /HPF   Sq Epi: x / Non Sq Epi: 0-5 /HPF / Bacteria: Present /HPF      Microbiology:  Culture - Blood (collected 11 Mar 2020 20:50)  Source: .Blood Blood-Peripheral  Preliminary Report (12 Mar 2020 09:01):  No growth at 12 hours    Culture - Blood (collected 11 Mar 2020 20:49)  Source: .Blood Blood-Peripheral  Preliminary Report (12 Mar 2020 09:01):  No growth at 12 hours    Culture - Urine (collected 11 Mar 2020 20:28)  Source: .Urine Clean Catch (Midstream)  Preliminary Report (12 Mar 2020 11:51):  No growth to date.      RADIOLOGY, EKG AND ADDITIONAL TESTS: Reviewed. OVERNIGHT EVENTS: Patient admitted overnight    SUBJECTIVE HPI: Patient seen and examined at bedside. Patient resting comfortably in bed. Pt endorses nonproductive cough, otherwise feeling well. Denies subjective fevers or chills, headaches,       VITAL SIGNS:  Vital Signs Last 24 Hrs  T(C): 36.6 (12 Mar 2020 12:42), Max: 38.8 (11 Mar 2020 18:07)  T(F): 97.8 (12 Mar 2020 12:42), Max: 101.8 (11 Mar 2020 18:07)  HR: 77 (12 Mar 2020 12:42) (77 - 118)  BP: 111/68 (12 Mar 2020 12:42) (75/54 - 111/68)  BP(mean): --  RR: 16 (12 Mar 2020 12:42) (16 - 16)  SpO2: 99% (12 Mar 2020 12:42) (86% - 99%)      20 @ 07:01  -  20 @ 14:20  --------------------------------------------------------  IN: 120 mL / OUT: 400 mL / NET: -280 mL      PHYSICAL EXAM:  General: Comfortable, pleasant, thin, non-toxic appearing, NAD  Neurological: AAOx3  HEENT: NC/AT; MMM  Neck: supple  Cardiovascular: +S1/S2, no murmurs/rubs/gallops, RRR  Respiratory: CTA in anterior lung fields, diminished breath sounds in lateral lung fields, no increased work of breathing or accessory muscle use, bibasilar egophony  Gastrointestinal: soft, NT/ND; BSx4 quadrants  Genitourinary: no suprapubic tenderness  Extremities: WWP; no edema  Vascular: 2+ radial, DP/PT pulses B/L      MEDICATIONS:  MEDICATIONS  (STANDING):  apixaban 5 milliGRAM(s) Oral two times a day  atorvastatin 10 milliGRAM(s) Oral at bedtime  cholecalciferol 1000 Unit(s) Oral daily  cyanocobalamin 1000 MICROGram(s) Oral daily  dextrose 5%. 1000 milliLiter(s) (50 mL/Hr) IV Continuous <Continuous>  dextrose 50% Injectable 12.5 Gram(s) IV Push once  dextrose 50% Injectable 25 Gram(s) IV Push once  dextrose 50% Injectable 25 Gram(s) IV Push once  folic acid 1 milliGRAM(s) Oral daily  insulin lispro (HumaLOG) corrective regimen sliding scale   SubCutaneous Before meals and at bedtime  levETIRAcetam 250 milliGRAM(s) Oral every 12 hours  midodrine. 5 milliGRAM(s) Oral every 8 hours  piperacillin/tazobactam IVPB.. 4.5 Gram(s) IV Intermittent every 6 hours  risperiDONE   Tablet 1 milliGRAM(s) Oral every 8 hours    MEDICATIONS  (PRN):  dextrose 40% Gel 15 Gram(s) Oral once PRN Blood Glucose LESS THAN 70 milliGRAM(s)/deciliter  glucagon  Injectable 1 milliGRAM(s) IntraMuscular once PRN Glucose LESS THAN 70 milligrams/deciliter      ALLERGIES/INTOLERANCES:  Allergies: Allergy Status Unknown  Intolerances: codeine (Other)      LABS:                        10.1   19.71 )-----------( 245      ( 12 Mar 2020 06:52 )             31.1     03-12    139  |  104  |  31<H>  ----------------------------<  137<H>  4.5   |  23  |  0.71    Ca    9.9      12 Mar 2020 06:52  Phos  3.9     03-12  Mg     1.6     -12    TPro  7.2  /  Alb  3.2<L>  /  TBili  0.6  /  DBili  x   /  AST  10  /  ALT  10  /  AlkPhos  84  03-11    PT/INR - ( 11 Mar 2020 18:08 )   PT: 17.7 sec;   INR: 1.53     PTT - ( 11 Mar 2020 18:08 )  PTT:30.3 sec    CAPILLARY BLOOD GLUCOSE  POCT Blood Glucose.: 200 mg/dL (12 Mar 2020 11:53)      Urinalysis Basic - ( 11 Mar 2020 18:16 )  Color: Yellow / Appearance: Clear / S.025 / pH: x  Gluc: x / Ketone: NEGATIVE  / Bili: Negative / Urobili: 0.2 E.U./dL   Blood: x / Protein: 30 mg/dL / Nitrite: NEGATIVE   Leuk Esterase: NEGATIVE / RBC: < 5 /HPF / WBC < 5 /HPF   Sq Epi: x / Non Sq Epi: 0-5 /HPF / Bacteria: Present /HPF      Microbiology:  Culture - Blood (collected 11 Mar 2020 20:50)  Source: .Blood Blood-Peripheral  Preliminary Report (12 Mar 2020 09:01):  No growth at 12 hours    Culture - Blood (collected 11 Mar 2020 20:49)  Source: .Blood Blood-Peripheral  Preliminary Report (12 Mar 2020 09:01):  No growth at 12 hours    Culture - Urine (collected 11 Mar 2020 20:28)  Source: .Urine Clean Catch (Midstream)  Preliminary Report (12 Mar 2020 11:51):  No growth to date.      RADIOLOGY, EKG AND ADDITIONAL TESTS: Reviewed.  < from: CT Angio Chest PE Protocol w/ IV Cont (20 @ 23:36) >  IMPRESSION: Scattered nodular groundglass opacities within the left upper lobe, left lower lobe and right lower lobe with more consolidative process at the bilateral lung bases. Findings are concerning for an active multifocal infectious/inflammatory pulmonary process. Differential includes bacterial and viral pneumonia (including COVID-19). Recommend correlation with history and clinical testing. Follow-up chest CT would be helpful to assess resolution.  < end of copied text >

## 2020-03-12 NOTE — ED ADULT NURSE NOTE - PRO INTERPRETER NEED 2
Gabapentin dose for neuropathy increased to 300mg TID on 6.2.17. Writer spoke to patient on 6.6.17 and patient wanted to stay on gabapentin 100mg TID since she just got a cortisone injection in her heal.     Gabapentin 300mg TID prescription still at pharmacy for patient. Called and spoke to patient. Informed patient that it is okay to go ahead and start the gabapentin 300mg TID since she is still having pain. Patient just wanted to make sure that we are aware that she will be increasing the dose as previously recommended. Informed patient that I will let Dr. Rooney know. Patient verbalized understanding.    English

## 2020-03-12 NOTE — PROGRESS NOTE ADULT - PROBLEM SELECTOR PLAN 3
- Currently in NSR.   - C/w Eliquis Pt with known afib on eliquis 5mg QD at home. PMD contacted regarding atypical dosing regimen and was told this decision was made by another provider due to "bleeding gums when on BID dosing".  -EKG on admission with sinus tachycardia  -unknown if pt with paroxysmal a fib, will obtain collateral  -will start eliquis 5mg BID to ensure adequate AC and monitor for signs of bleeding.

## 2020-03-12 NOTE — PROGRESS NOTE ADULT - PROBLEM SELECTOR PLAN 1
- Fever, tachycardia, hypotension with lactate 3.4.   - WWP. Lactate resolved after 3L NS  - Source: pneumonia. Mgmt of pneumonia as below (#2)  - f/u cultures On admission, febrile (T100.7) and met 2/4 SIRS criteria (WBC17 w/ 13.8% bands, ). Lactate 3.4  - Source: pneumonia.  - No longer meeting sepsis criteria, lactate cleared with fluids  - Mgmt of pneumonia as below (#2)

## 2020-03-12 NOTE — H&P ADULT - ATTENDING COMMENTS
Pt. seen and examined this morning; I have read Dr. Long's H&P, I agree w/ his findings and plan of care as documented; cont. Zosyn for HAP and aspiration PNA; MRSA PCR negative, vanc d/c'ed; low suspicion for COVID-19, cont. airborne and contact isolation, pending results

## 2020-03-12 NOTE — PROGRESS NOTE ADULT - PROBLEM SELECTOR PLAN 4
- C/w home keppra 250mg bid Known seizure disorder, keppra dose changed from 500mg QD to 250mg BID on last admission  - C/w home keppra 250mg bid

## 2020-03-12 NOTE — H&P ADULT - NSHPLABSRESULTS_GEN_ALL_CORE
11.1   17.26 )-----------( 264      ( 11 Mar 2020 18:08 )             34.4       LIVER FUNCTIONS - ( 11 Mar 2020 18:08 )  Alb: 3.2 g/dL / Pro: 7.2 g/dL / ALK PHOS: 84 U/L / ALT: 10 U/L / AST: 10 U/L / GGT: x             Urinalysis Basic - ( 11 Mar 2020 18:16 )    Color: Yellow / Appearance: Clear / S.025 / pH: x  Gluc: x / Ketone: NEGATIVE  / Bili: Negative / Urobili: 0.2 E.U./dL   Blood: x / Protein: 30 mg/dL / Nitrite: NEGATIVE   Leuk Esterase: NEGATIVE / RBC: < 5 /HPF / WBC < 5 /HPF   Sq Epi: x / Non Sq Epi: 0-5 /HPF / Bacteria: Present /HPF      All imaging reviewed

## 2020-03-12 NOTE — H&P ADULT - PROBLEM SELECTOR PLAN 2
- Cover for hospital acquired pneumonia given recent admission for aspiration pneumonia  - vanc and zosyn  - MRSA nasal swab  - legionella  - RVP negative  - COVID R/O. Isolation  - F/u procal  - aspiration precautions  - F/u blood cultures

## 2020-03-12 NOTE — PROGRESS NOTE ADULT - PROBLEM SELECTOR PLAN 2
- Cover for hospital acquired pneumonia given recent admission for aspiration pneumonia  - vanc and zosyn  - MRSA nasal swab  - legionella  - RVP negative  - COVID R/O. Isolation  - F/u procal  - aspiration precautions  - F/u blood cultures P/w generalized weakness, lethargy, and SOB, found to be febrile, tachy, w/ leukocytosis and bandemia. Recent admission 2/17-19 for ?aspiration pna treated with unasyn -> augmentin. CXR w/ patchy b/l infiltrates, CT chest w/ ground-glass opacities. procal 0.45. RVP neg.  Etiology of pneumonia likely HAP in setting of recent hospitalization and CAP treatment vs viral pneumonia. Pt w/o risk factors for CoVID-19 (no recent travel, known sick/CoVID+ pts) however due to increased prevalence cannot r/o.  -s/p ceft, azithro, and zosyn in ED  -MRSA swab negative, vancomycin discontinued  -c/w zosyn for HAP coverage  -f/u urine legionella for atypical pna  - f/u CoVID-19 swab, maintain isolation/contact precautions during admission  -aspiration precautions due to past hx of aspiration pna  -f/u blood clx

## 2020-03-12 NOTE — PATIENT PROFILE ADULT - VISION (WITH CORRECTIVE LENSES IF THE PATIENT USUALLY WEARS THEM):
Partially impaired: cannot see medication labels or newsprint, but can see obstacles in path, and the surrounding layout; can count fingers at arm's length/Has cataracts b/l

## 2020-03-13 ENCOUNTER — TRANSCRIPTION ENCOUNTER (OUTPATIENT)
Age: 70
End: 2020-03-13

## 2020-03-13 VITALS
SYSTOLIC BLOOD PRESSURE: 94 MMHG | RESPIRATION RATE: 17 BRPM | DIASTOLIC BLOOD PRESSURE: 60 MMHG | OXYGEN SATURATION: 95 % | TEMPERATURE: 98 F | HEART RATE: 77 BPM

## 2020-03-13 DIAGNOSIS — Z99.3 DEPENDENCE ON WHEELCHAIR: ICD-10-CM

## 2020-03-13 DIAGNOSIS — R91.8 OTHER NONSPECIFIC ABNORMAL FINDING OF LUNG FIELD: ICD-10-CM

## 2020-03-13 DIAGNOSIS — E11.9 TYPE 2 DIABETES MELLITUS WITHOUT COMPLICATIONS: ICD-10-CM

## 2020-03-13 DIAGNOSIS — J15.6 PNEUMONIA DUE TO OTHER GRAM-NEGATIVE BACTERIA: ICD-10-CM

## 2020-03-13 LAB
ALBUMIN SERPL ELPH-MCNC: 2.6 G/DL — LOW (ref 3.3–5)
ALP SERPL-CCNC: 70 U/L — SIGNIFICANT CHANGE UP (ref 40–120)
ALT FLD-CCNC: 9 U/L — LOW (ref 10–45)
ANION GAP SERPL CALC-SCNC: 10 MMOL/L — SIGNIFICANT CHANGE UP (ref 5–17)
AST SERPL-CCNC: 12 U/L — SIGNIFICANT CHANGE UP (ref 10–40)
BASOPHILS # BLD AUTO: 0.02 K/UL — SIGNIFICANT CHANGE UP (ref 0–0.2)
BASOPHILS NFR BLD AUTO: 0.2 % — SIGNIFICANT CHANGE UP (ref 0–2)
BILIRUB SERPL-MCNC: 0.3 MG/DL — SIGNIFICANT CHANGE UP (ref 0.2–1.2)
BUN SERPL-MCNC: 23 MG/DL — SIGNIFICANT CHANGE UP (ref 7–23)
CALCIUM SERPL-MCNC: 8.8 MG/DL — SIGNIFICANT CHANGE UP (ref 8.4–10.5)
CHLORIDE SERPL-SCNC: 106 MMOL/L — SIGNIFICANT CHANGE UP (ref 96–108)
CO2 SERPL-SCNC: 23 MMOL/L — SIGNIFICANT CHANGE UP (ref 22–31)
CREAT SERPL-MCNC: 0.68 MG/DL — SIGNIFICANT CHANGE UP (ref 0.5–1.3)
CULTURE RESULTS: NO GROWTH — SIGNIFICANT CHANGE UP
EOSINOPHIL # BLD AUTO: 0.75 K/UL — HIGH (ref 0–0.5)
EOSINOPHIL NFR BLD AUTO: 5.9 % — SIGNIFICANT CHANGE UP (ref 0–6)
GLUCOSE BLDC GLUCOMTR-MCNC: 128 MG/DL — HIGH (ref 70–99)
GLUCOSE BLDC GLUCOMTR-MCNC: 179 MG/DL — HIGH (ref 70–99)
GLUCOSE BLDC GLUCOMTR-MCNC: 227 MG/DL — HIGH (ref 70–99)
GLUCOSE SERPL-MCNC: 127 MG/DL — HIGH (ref 70–99)
HCT VFR BLD CALC: 29.7 % — LOW (ref 39–50)
HGB BLD-MCNC: 9.4 G/DL — LOW (ref 13–17)
IMM GRANULOCYTES NFR BLD AUTO: 0.3 % — SIGNIFICANT CHANGE UP (ref 0–1.5)
LYMPHOCYTES # BLD AUTO: 1.93 K/UL — SIGNIFICANT CHANGE UP (ref 1–3.3)
LYMPHOCYTES # BLD AUTO: 15.2 % — SIGNIFICANT CHANGE UP (ref 13–44)
MAGNESIUM SERPL-MCNC: 1.6 MG/DL — SIGNIFICANT CHANGE UP (ref 1.6–2.6)
MCHC RBC-ENTMCNC: 29.7 PG — SIGNIFICANT CHANGE UP (ref 27–34)
MCHC RBC-ENTMCNC: 31.6 GM/DL — LOW (ref 32–36)
MCV RBC AUTO: 94 FL — SIGNIFICANT CHANGE UP (ref 80–100)
MONOCYTES # BLD AUTO: 0.9 K/UL — SIGNIFICANT CHANGE UP (ref 0–0.9)
MONOCYTES NFR BLD AUTO: 7.1 % — SIGNIFICANT CHANGE UP (ref 2–14)
NEUTROPHILS # BLD AUTO: 9.05 K/UL — HIGH (ref 1.8–7.4)
NEUTROPHILS NFR BLD AUTO: 71.3 % — SIGNIFICANT CHANGE UP (ref 43–77)
NRBC # BLD: 0 /100 WBCS — SIGNIFICANT CHANGE UP (ref 0–0)
PLATELET # BLD AUTO: 240 K/UL — SIGNIFICANT CHANGE UP (ref 150–400)
POTASSIUM SERPL-MCNC: 3.8 MMOL/L — SIGNIFICANT CHANGE UP (ref 3.5–5.3)
POTASSIUM SERPL-SCNC: 3.8 MMOL/L — SIGNIFICANT CHANGE UP (ref 3.5–5.3)
PROT SERPL-MCNC: 6.6 G/DL — SIGNIFICANT CHANGE UP (ref 6–8.3)
RBC # BLD: 3.16 M/UL — LOW (ref 4.2–5.8)
RBC # FLD: 13.3 % — SIGNIFICANT CHANGE UP (ref 10.3–14.5)
SODIUM SERPL-SCNC: 139 MMOL/L — SIGNIFICANT CHANGE UP (ref 135–145)
SPECIMEN SOURCE: SIGNIFICANT CHANGE UP
WBC # BLD: 12.69 K/UL — HIGH (ref 3.8–10.5)
WBC # FLD AUTO: 12.69 K/UL — HIGH (ref 3.8–10.5)

## 2020-03-13 PROCEDURE — 80048 BASIC METABOLIC PNL TOTAL CA: CPT

## 2020-03-13 PROCEDURE — 81001 URINALYSIS AUTO W/SCOPE: CPT

## 2020-03-13 PROCEDURE — 84100 ASSAY OF PHOSPHORUS: CPT

## 2020-03-13 PROCEDURE — 96361 HYDRATE IV INFUSION ADD-ON: CPT

## 2020-03-13 PROCEDURE — 82330 ASSAY OF CALCIUM: CPT

## 2020-03-13 PROCEDURE — 99239 HOSP IP/OBS DSCHRG MGMT >30: CPT | Mod: GC

## 2020-03-13 PROCEDURE — 82803 BLOOD GASES ANY COMBINATION: CPT

## 2020-03-13 PROCEDURE — 87486 CHLMYD PNEUM DNA AMP PROBE: CPT

## 2020-03-13 PROCEDURE — 87040 BLOOD CULTURE FOR BACTERIA: CPT

## 2020-03-13 PROCEDURE — 36415 COLL VENOUS BLD VENIPUNCTURE: CPT

## 2020-03-13 PROCEDURE — 85730 THROMBOPLASTIN TIME PARTIAL: CPT

## 2020-03-13 PROCEDURE — 87086 URINE CULTURE/COLONY COUNT: CPT

## 2020-03-13 PROCEDURE — 84295 ASSAY OF SERUM SODIUM: CPT

## 2020-03-13 PROCEDURE — 80053 COMPREHEN METABOLIC PANEL: CPT

## 2020-03-13 PROCEDURE — 93005 ELECTROCARDIOGRAM TRACING: CPT

## 2020-03-13 PROCEDURE — 99285 EMERGENCY DEPT VISIT HI MDM: CPT | Mod: 25

## 2020-03-13 PROCEDURE — 87633 RESP VIRUS 12-25 TARGETS: CPT

## 2020-03-13 PROCEDURE — 71275 CT ANGIOGRAPHY CHEST: CPT

## 2020-03-13 PROCEDURE — 97162 PT EVAL MOD COMPLEX 30 MIN: CPT

## 2020-03-13 PROCEDURE — 82962 GLUCOSE BLOOD TEST: CPT

## 2020-03-13 PROCEDURE — 96374 THER/PROPH/DIAG INJ IV PUSH: CPT

## 2020-03-13 PROCEDURE — 87581 M.PNEUMON DNA AMP PROBE: CPT

## 2020-03-13 PROCEDURE — 85025 COMPLETE CBC W/AUTO DIFF WBC: CPT

## 2020-03-13 PROCEDURE — 85610 PROTHROMBIN TIME: CPT

## 2020-03-13 PROCEDURE — 83605 ASSAY OF LACTIC ACID: CPT

## 2020-03-13 PROCEDURE — 84145 PROCALCITONIN (PCT): CPT

## 2020-03-13 PROCEDURE — 84132 ASSAY OF SERUM POTASSIUM: CPT

## 2020-03-13 PROCEDURE — 87798 DETECT AGENT NOS DNA AMP: CPT

## 2020-03-13 PROCEDURE — 83735 ASSAY OF MAGNESIUM: CPT

## 2020-03-13 PROCEDURE — 87641 MR-STAPH DNA AMP PROBE: CPT

## 2020-03-13 PROCEDURE — 71045 X-RAY EXAM CHEST 1 VIEW: CPT

## 2020-03-13 PROCEDURE — 87635 SARS-COV-2 COVID-19 AMP PRB: CPT

## 2020-03-13 RX ORDER — POTASSIUM CHLORIDE 20 MEQ
20 PACKET (EA) ORAL ONCE
Refills: 0 | Status: COMPLETED | OUTPATIENT
Start: 2020-03-13 | End: 2020-03-13

## 2020-03-13 RX ORDER — MAGNESIUM SULFATE 500 MG/ML
1 VIAL (ML) INJECTION ONCE
Refills: 0 | Status: COMPLETED | OUTPATIENT
Start: 2020-03-13 | End: 2020-03-13

## 2020-03-13 RX ORDER — APIXABAN 2.5 MG/1
1 TABLET, FILM COATED ORAL
Qty: 0 | Refills: 0 | DISCHARGE
Start: 2020-03-13

## 2020-03-13 RX ADMIN — RISPERIDONE 1 MILLIGRAM(S): 4 TABLET ORAL at 07:18

## 2020-03-13 RX ADMIN — MIDODRINE HYDROCHLORIDE 5 MILLIGRAM(S): 2.5 TABLET ORAL at 06:21

## 2020-03-13 RX ADMIN — PIPERACILLIN AND TAZOBACTAM 25 GRAM(S): 4; .5 INJECTION, POWDER, LYOPHILIZED, FOR SOLUTION INTRAVENOUS at 06:21

## 2020-03-13 RX ADMIN — LEVETIRACETAM 250 MILLIGRAM(S): 250 TABLET, FILM COATED ORAL at 17:59

## 2020-03-13 RX ADMIN — PREGABALIN 1000 MICROGRAM(S): 225 CAPSULE ORAL at 12:59

## 2020-03-13 RX ADMIN — APIXABAN 5 MILLIGRAM(S): 2.5 TABLET, FILM COATED ORAL at 06:21

## 2020-03-13 RX ADMIN — Medication 4: at 12:54

## 2020-03-13 RX ADMIN — PIPERACILLIN AND TAZOBACTAM 200 GRAM(S): 4; .5 INJECTION, POWDER, LYOPHILIZED, FOR SOLUTION INTRAVENOUS at 13:02

## 2020-03-13 RX ADMIN — LEVETIRACETAM 250 MILLIGRAM(S): 250 TABLET, FILM COATED ORAL at 06:21

## 2020-03-13 RX ADMIN — RISPERIDONE 1 MILLIGRAM(S): 4 TABLET ORAL at 00:07

## 2020-03-13 RX ADMIN — Medication 2: at 17:59

## 2020-03-13 RX ADMIN — RISPERIDONE 1 MILLIGRAM(S): 4 TABLET ORAL at 17:59

## 2020-03-13 RX ADMIN — APIXABAN 5 MILLIGRAM(S): 2.5 TABLET, FILM COATED ORAL at 17:59

## 2020-03-13 RX ADMIN — PIPERACILLIN AND TAZOBACTAM 25 GRAM(S): 4; .5 INJECTION, POWDER, LYOPHILIZED, FOR SOLUTION INTRAVENOUS at 00:07

## 2020-03-13 RX ADMIN — Medication 20 MILLIEQUIVALENT(S): at 12:57

## 2020-03-13 RX ADMIN — Medication 1000 UNIT(S): at 12:59

## 2020-03-13 RX ADMIN — Medication 1 MILLIGRAM(S): at 12:58

## 2020-03-13 RX ADMIN — MIDODRINE HYDROCHLORIDE 2.5 MILLIGRAM(S): 2.5 TABLET ORAL at 13:02

## 2020-03-13 RX ADMIN — Medication 100 GRAM(S): at 12:56

## 2020-03-13 NOTE — DISCHARGE NOTE NURSING/CASE MANAGEMENT/SOCIAL WORK - NSDCPEWEB_GEN_ALL_CORE
[facility] for Tobacco Control website --- [email or web address] website --- [email or web address]

## 2020-03-13 NOTE — DISCHARGE NOTE NURSING/CASE MANAGEMENT/SOCIAL WORK - PATIENT PORTAL LINK FT
You can access the FollowMyHealth Patient Portal offered by [facility] by registering at the following website: [email or web address]. By joining [employer]’s FollowMyHealth portal, you will also be able to view your health information using other applications (apps) compatible with our system.

## 2020-03-13 NOTE — PHYSICAL THERAPY INITIAL EVALUATION ADULT - MODALITIES TREATMENT COMMENTS
Pt desat to 90% on room air after bed<--> chair transfer. slight SOB, Dr Doyle is notified post PT session.

## 2020-03-13 NOTE — DISCHARGE NOTE PROVIDER - NSDCFUADDAPPT_GEN_ALL_CORE_FT
You should follow-up with your PCP Dr. Garcia within one week on Thursday 3/19/20, as a walk in 9am-4pm, to discuss any future changes to your medications and management.

## 2020-03-13 NOTE — PHYSICAL THERAPY INITIAL EVALUATION ADULT - ADDITIONAL COMMENTS
Pt lives with spouse in an apartment with elevator, few steps enter building, but pt usually have the door man and spouse carry him up with wheelchair. Pt was wheelchair bound prior to admission, able to perform wheelchair transfer independently. from medical chart, pt' spouse just had breast surgery recently, but willing to privately hire someone to assist pt with ADLs and functional mobility if needed.

## 2020-03-13 NOTE — DISCHARGE NOTE PROVIDER - NSDCMRMEDTOKEN_GEN_ALL_CORE_FT
apixaban 5 mg oral tablet: 1 tab(s) orally every 12 hours  atorvastatin 20 mg oral tablet: 0.5 tab(s) orally once a day (at bedtime)  folic acid 1 mg oral tablet: 1 tab(s) orally once a day  HumaLOG KwikPen 100 units/mL subcutaneous solution:  subcutaneous Before Meals and at Bedtime Correctional Scale   levETIRAcetam 250 mg oral tablet: 1 tab(s) orally every 12 hours  midodrine 5 mg oral tablet: 1 tab(s) orally 3 times a day  mirtazapine 15 mg oral tablet: 0.5 tab(s) orally once a day (at bedtime)  risperiDONE 1 mg oral tablet: 1 cap(s) orally 3 times a day (before meals)  Vitamin B12 50 mcg oral tablet: 1 tab(s) orally once a day  Vitamin D3 5000 intl units (125 mcg) oral capsule: 1 cap(s) orally once a day amoxicillin-clavulanate 875 mg-125 mg oral tablet: 1 tab(s) orally 2 times a day   apixaban 5 mg oral tablet: 1 tab(s) orally 2 times a day  atorvastatin 20 mg oral tablet: 0.5 tab(s) orally once a day (at bedtime)  folic acid 1 mg oral tablet: 1 tab(s) orally once a day  HumaLOG KwikPen 100 units/mL subcutaneous solution:  subcutaneous Before Meals and at Bedtime Correctional Scale   levETIRAcetam 250 mg oral tablet: 1 tab(s) orally every 12 hours  midodrine 5 mg oral tablet: 1 tab(s) orally 3 times a day  mirtazapine 15 mg oral tablet: 0.5 tab(s) orally once a day (at bedtime)  risperiDONE 1 mg oral tablet: 1 cap(s) orally 3 times a day (before meals)  Vitamin B12 50 mcg oral tablet: 1 tab(s) orally once a day  Vitamin D3 5000 intl units (125 mcg) oral capsule: 1 cap(s) orally once a day

## 2020-03-13 NOTE — DISCHARGE NOTE PROVIDER - NSDCCPCAREPLAN_GEN_ALL_CORE_FT
PRINCIPAL DISCHARGE DIAGNOSIS  Diagnosis: Pneumonia  Assessment and Plan of Treatment: You came in with a fever, cough, and shortness of breath which met the criteria for sepsis. This was likely due to pneumonia in your lungs. The bloodwork showed that you did not have any viral causes of your pneumonia and was likely related to your recent hospitalization. You were given IV fluids and broad spectrum antibiotics with improvement in your blood work and symptoms. After discharge you should, continue to take 5 more days of antibiotic called augmentin 875 mg twice a day (last day 3/18). You should follow-up with your PCP Dr. Garcia on Thursday 3/19/20, as a walk in 9am-4pm, to discuss any future chaneges to your medications and management.      SECONDARY DISCHARGE DIAGNOSES  Diagnosis: Atrial fibrillation, unspecified type  Assessment and Plan of Treatment: You have a history of atrial fibrillation. You were on a regimen of eliquis 5mg once a day that we have increased to the standard dosing of 5mg twice a day. You should continue taking eliquis 5mg twice a day. Eliquis is a blood thinner and increases your risk of bleeding. If you notice bleeding gums, blood in your urine, blood in your stool, or unable to stop bleeding after a cut, please seek immediate medical attention. You should follow-up with your PCP Dr. Garcia on Thursday 3/19/20, as a walk in 9am-4pm, to discuss any future chaneges to your medications and management.

## 2020-03-13 NOTE — DISCHARGE NOTE NURSING/CASE MANAGEMENT/SOCIAL WORK - NSDCPEEMAIL_GEN_ALL_CORE
[facility] for Tobacco Control email [email or web address]

## 2020-03-13 NOTE — PROGRESS NOTE ADULT - SUBJECTIVE AND OBJECTIVE BOX
Patient is a 69y old  Male who presents with a chief complaint of fever sob (13 Mar 2020 11:46)      INTERVAL HPI/OVERNIGHT EVENTS:    Pt. seen and examined this morning  Pt. feels well, reports increased energy  Denies F/C, CP, SOB, cough    Review of Systems: 12 point review of systems otherwise negative    MEDICATIONS  (STANDING):  apixaban 5 milliGRAM(s) Oral two times a day  atorvastatin 10 milliGRAM(s) Oral at bedtime  cholecalciferol 1000 Unit(s) Oral daily  cyanocobalamin 1000 MICROGram(s) Oral daily  dextrose 5%. 1000 milliLiter(s) (50 mL/Hr) IV Continuous <Continuous>  dextrose 50% Injectable 12.5 Gram(s) IV Push once  dextrose 50% Injectable 25 Gram(s) IV Push once  dextrose 50% Injectable 25 Gram(s) IV Push once  folic acid 1 milliGRAM(s) Oral daily  insulin lispro (HumaLOG) corrective regimen sliding scale   SubCutaneous Before meals and at bedtime  levETIRAcetam 250 milliGRAM(s) Oral every 12 hours  midodrine. 5 milliGRAM(s) Oral every 8 hours  piperacillin/tazobactam IVPB.. 4.5 Gram(s) IV Intermittent every 6 hours  risperiDONE   Tablet 1 milliGRAM(s) Oral every 8 hours    MEDICATIONS  (PRN):  dextrose 40% Gel 15 Gram(s) Oral once PRN Blood Glucose LESS THAN 70 milliGRAM(s)/deciliter  glucagon  Injectable 1 milliGRAM(s) IntraMuscular once PRN Glucose LESS THAN 70 milligrams/deciliter      Allergies    Allergy Status Unknown    Intolerances    codeine (Other)        Vital Signs Last 24 Hrs  T(C): 36.8 (13 Mar 2020 13:18), Max: 37.2 (12 Mar 2020 23:42)  T(F): 98.2 (13 Mar 2020 13:18), Max: 98.9 (12 Mar 2020 23:42)  HR: 77 (13 Mar 2020 13:18) (67 - 77)  BP: 94/60 (13 Mar 2020 13:18) (94/60 - 132/72)  BP(mean): --  RR: 17 (13 Mar 2020 13:18) (17 - 18)  SpO2: 95% (13 Mar 2020 13:18) (95% - 97%)  CAPILLARY BLOOD GLUCOSE      POCT Blood Glucose.: 179 mg/dL (13 Mar 2020 17:08)  POCT Blood Glucose.: 227 mg/dL (13 Mar 2020 12:00)  POCT Blood Glucose.: 128 mg/dL (13 Mar 2020 08:40)      03-12 @ 07:01  -  03-13 @ 07:00  --------------------------------------------------------  IN: 240 mL / OUT: 1075 mL / NET: -835 mL        Physical Exam:  (this morning)  Daily     Daily   General:  comfortable-appearing in NAD  HEENT:  MMM  CV:  RRR  Lungs:  CTA B/L, normal WOB on NC  Abdomen:  soft NT ND  Extremities:  no edema B/L LE  Skin:  WWP  Neuro:  AAOx3, forgetful    LABS:                        9.4    12.69 )-----------( 240      ( 13 Mar 2020 05:48 )             29.7     03-13    139  |  106  |  23  ----------------------------<  127<H>  3.8   |  23  |  0.68    Ca    8.8      13 Mar 2020 05:48  Phos  3.9     03-12  Mg     1.6     03-13    TPro  6.6  /  Alb  2.6<L>  /  TBili  0.3  /  DBili  x   /  AST  12  /  ALT  9<L>  /  AlkPhos  70  03-13            RADIOLOGY & ADDITIONAL TESTS:    ---------------------------------------------------------------------------  I personally reviewed: [  ]EKG   [  ]CXR    [  ] CT    [  ]Other  ---------------------------------------------------------------------------  PLEASE CHECK WHEN PRESENT:     [  ]Heart Failure     [  ] Acute     [  ] Acute on Chronic     [  ] Chronic  -------------------------------------------------------------------     [  ]Diastolic [HFpEF]     [  ]Systolic [HFrEF]     [  ]Combined [HFpEF & HFrEF]     [  ]Other:  -------------------------------------------------------------------  [  ]HAYDEE     [  ]ATN     [  ]Reneal Medullary Necrosis     [  ]Renal Cortical Necrosis     [  ]Other Pathological Lesions:    [  ]CKD 1  [  ]CKD 2  [  ]CKD 3  [  ]CKD 4  [  ]CKD 5  [  ]Other  -------------------------------------------------------------------  [  ]Other/Unspecified:    --------------------------------------------------------------------    Abdominal Nutritional Status  [  ]Malnutrition: See Nutrition Note  [  ]Cachexia  [  ]Other:   [  ]Supplement Ordered:  [  ]Morbid Obesity (BMI >=40]

## 2020-03-13 NOTE — PHYSICAL THERAPY INITIAL EVALUATION ADULT - GENERAL OBSERVATIONS, REHAB EVAL
Pt received supine in bed with +hep-lock, +NC~3L, NAD. Pt left as found, NAD, call bell in reach, RN awares.

## 2020-03-13 NOTE — DISCHARGE NOTE PROVIDER - CARE PROVIDER_API CALL
Danny Garcia)  Family Medicine  [address]  [place], [place] [address]  Phone: [phone]  Fax: [phone]  Established Patient  Follow Up Time: 1 week

## 2020-03-23 DIAGNOSIS — G40.909 EPILEPSY, UNSPECIFIED, NOT INTRACTABLE, WITHOUT STATUS EPILEPTICUS: ICD-10-CM

## 2020-03-23 DIAGNOSIS — D64.9 ANEMIA, UNSPECIFIED: ICD-10-CM

## 2020-03-23 DIAGNOSIS — R65.20 SEVERE SEPSIS WITHOUT SEPTIC SHOCK: ICD-10-CM

## 2020-03-23 DIAGNOSIS — Z79.01 LONG TERM (CURRENT) USE OF ANTICOAGULANTS: ICD-10-CM

## 2020-03-23 DIAGNOSIS — A41.9 SEPSIS, UNSPECIFIED ORGANISM: ICD-10-CM

## 2020-03-23 DIAGNOSIS — Z79.4 LONG TERM (CURRENT) USE OF INSULIN: ICD-10-CM

## 2020-03-23 DIAGNOSIS — J15.6 PNEUMONIA DUE TO OTHER GRAM-NEGATIVE BACTERIA: ICD-10-CM

## 2020-03-23 DIAGNOSIS — E78.5 HYPERLIPIDEMIA, UNSPECIFIED: ICD-10-CM

## 2020-03-23 DIAGNOSIS — I48.91 UNSPECIFIED ATRIAL FIBRILLATION: ICD-10-CM

## 2020-03-23 DIAGNOSIS — I95.1 ORTHOSTATIC HYPOTENSION: ICD-10-CM

## 2020-03-23 DIAGNOSIS — Z99.3 DEPENDENCE ON WHEELCHAIR: ICD-10-CM

## 2020-03-23 DIAGNOSIS — E11.40 TYPE 2 DIABETES MELLITUS WITH DIABETIC NEUROPATHY, UNSPECIFIED: ICD-10-CM

## 2020-03-23 DIAGNOSIS — F32.9 MAJOR DEPRESSIVE DISORDER, SINGLE EPISODE, UNSPECIFIED: ICD-10-CM

## 2020-08-13 NOTE — PATIENT PROFILE ADULT. - NUTRITION PROFILE
From: Sindhu Rizvi  To: Jasen Angel MD  Sent: 8/13/2020 9:50 AM EDT  Subject: Prescription Question    Hello again,  I just received a message saying that my sertraline prescription is being refilled. I did not actually request for it to be refilled and it looks like the [facility] to the wrong [facility]. Would it be possible to [facility] at [facility] [address] and put it in instead at the [facility] on [address]? Thank you!   Joselin Mar
no indicators present

## 2021-03-06 NOTE — PATIENT PROFILE ADULT - NSPROMEDSHERBAL_GEN_A_NUR
EMERGENCY DEPARTMENT HISTORY AND PHYSICAL EXAM    Date: 3/6/2021  Patient Name: Alyce Mercado    History of Presenting Illness     Chief Complaint   Patient presents with    Chest Pain    Arm Pain         History Provided By: Patient    HPI: Alyce Mercado is a 33 y.o. male with a PMH of myocardial infarction who presents with right arm pain and right-sided chest pain x2 to 3 days. Patient reports a history of MI in October of last year and became concerned as he was having similar symptoms for the past few days. Patient states pain is like a dull aching and exacerbated with deep breathing. Patient denies any cough, nausea, vomiting, shortness of breath, fever, chills. Patient does report recently having a Covid test about 3 weeks ago which was negative. Patient is no longer a smoker and only drinks occasionally. Patient rates pain 4 out of 10 and states that he did take 3 of his baby aspirin this morning. PCP: None    Current Outpatient Medications   Medication Sig Dispense Refill    clopidogreL (PLAVIX) 75 mg tab Take 75 mg by mouth.  aspirin 81 mg chewable tablet Take 81 mg by mouth daily.  dilTIAZem ER (DILACOR XR) 240 mg capsule Take 240 mg by mouth daily.  atorvastatin (LIPITOR) 40 mg tablet Take 40 mg by mouth daily.  HYDROcodone-acetaminophen (LORTAB ELIXIR) 0.67-20 mg/mL oral solution Take 15-30 mL by mouth three (3) times daily as needed. Max Daily Amount: 60 mg. 200 mL 0    aluminum-magnesium hydroxide 200-200 mg/5 mL susp 5 mL, diphenhydrAMINE 12.5 mg/5 mL liqd 12.5 mg, lidocaine 2 % soln 5 mL 5-10 mL by Swish and Spit route every six (6) hours as needed for Stomatitis. Magic mouth wash   Maalox  Lidocaine 2% viscous   Diphenhydramine oral solution     Pharmacy to mix equal portions of ingredients to a total volume as indicated in the dispense amount. 100 mL 1    amoxicillin (AMOXIL) 125 mg/5 mL suspension Take  by mouth three (3) times daily.       ibuprofen (MOTRIN) 600 mg tablet Take 1 Tab by mouth every six (6) hours as needed for Pain. 20 Tab 0    loratadine (CLARITIN) 10 mg tablet Take 1 Tab by mouth daily. 20 Tab 0    phenol throat spray (CHLORASEPTIC THROAT SPRAY) 1.4 % spray Take 1 Spray by mouth four (4) times daily as needed for Sore throat. 1 Bottle 0       Past History     Past Medical History:  Past Medical History:   Diagnosis Date    CAD (coronary artery disease)     MI  on 10/31/20       Past Surgical History:  History reviewed. No pertinent surgical history. Family History:  History reviewed. No pertinent family history. Social History:  Social History     Tobacco Use    Smoking status: Current Every Day Smoker    Smokeless tobacco: Never Used   Substance Use Topics    Alcohol use: No     Comment: occasional    Drug use: No       Allergies:  No Known Allergies      Review of Systems   Review of Systems   Constitutional: Negative for chills and fever. Cardiovascular: Positive for chest pain. Gastrointestinal: Negative for abdominal pain, nausea and vomiting. Musculoskeletal: Positive for myalgias. Allergic/Immunologic: Negative for immunocompromised state. Neurological: Negative for speech difficulty and weakness. All other systems reviewed and are negative. Physical Exam     Vitals:    03/06/21 1413 03/06/21 1416   BP: (!) 154/124 (!) 132/90   Pulse: 73 74   Resp: 15 14   Temp: 98 °F (36.7 °C)    SpO2: 95% 95%   Weight: 83.9 kg (185 lb)    Height: 5' 10\" (1.778 m)      Physical Exam  Vitals signs and nursing note reviewed. Constitutional:       General: He is not in acute distress. Appearance: He is well-developed. HENT:      Head: Normocephalic and atraumatic. Mouth/Throat:      Pharynx: No oropharyngeal exudate. Eyes:      Conjunctiva/sclera: Conjunctivae normal.   Cardiovascular:      Rate and Rhythm: Normal rate and regular rhythm.       Pulses:           Radial pulses are 2+ on the right side and 2+ on the left side. Heart sounds: Normal heart sounds. Pulmonary:      Effort: Pulmonary effort is normal. No respiratory distress. Breath sounds: Normal breath sounds. No wheezing or rales. Musculoskeletal: Normal range of motion. Skin:     General: Skin is warm and dry. Neurological:      Mental Status: He is alert and oriented to person, place, and time. Diagnostic Study Results     Labs -     Recent Results (from the past 12 hour(s))   EKG, 12 LEAD, INITIAL    Collection Time: 03/06/21  2:24 PM   Result Value Ref Range    Ventricular Rate 71 BPM    Atrial Rate 71 BPM    P-R Interval 158 ms    QRS Duration 94 ms    Q-T Interval 382 ms    QTC Calculation (Bezet) 415 ms    Calculated P Axis 64 degrees    Calculated R Axis 59 degrees    Calculated T Axis 18 degrees    Diagnosis       Normal sinus rhythm  Cannot rule out Inferior infarct , age undetermined  Abnormal ECG  No previous ECGs available     CBC WITH AUTOMATED DIFF    Collection Time: 03/06/21  2:33 PM   Result Value Ref Range    WBC 4.6 4.1 - 11.1 K/uL    RBC 4.19 4. 10 - 5.70 M/uL    HGB 13.3 12.1 - 17.0 g/dL    HCT 40.2 36.6 - 50.3 %    MCV 95.9 80.0 - 99.0 FL    MCH 31.7 26.0 - 34.0 PG    MCHC 33.1 30.0 - 36.5 g/dL    RDW 12.9 11.5 - 14.5 %    PLATELET 680 322 - 874 K/uL    MPV 11.0 8.9 - 12.9 FL    NRBC 0.0 0  WBC    ABSOLUTE NRBC 0.00 0.00 - 0.01 K/uL    NEUTROPHILS 57 32 - 75 %    LYMPHOCYTES 30 12 - 49 %    MONOCYTES 12 5 - 13 %    EOSINOPHILS 1 0 - 7 %    BASOPHILS 0 0 - 1 %    IMMATURE GRANULOCYTES 0 0.0 - 0.5 %    ABS. NEUTROPHILS 2.6 1.8 - 8.0 K/UL    ABS. LYMPHOCYTES 1.4 0.8 - 3.5 K/UL    ABS. MONOCYTES 0.6 0.0 - 1.0 K/UL    ABS. EOSINOPHILS 0.1 0.0 - 0.4 K/UL    ABS. BASOPHILS 0.0 0.0 - 0.1 K/UL    ABS. IMM.  GRANS. 0.0 0.00 - 0.04 K/UL    DF AUTOMATED     METABOLIC PANEL, COMPREHENSIVE    Collection Time: 03/06/21  2:33 PM   Result Value Ref Range    Sodium 142 136 - 145 mmol/L    Potassium 3.6 3.5 - 5.1 mmol/L    Chloride 107 97 - 108 mmol/L    CO2 27 21 - 32 mmol/L    Anion gap 8 5 - 15 mmol/L    Glucose 112 (H) 65 - 100 mg/dL    BUN 12 6 - 20 MG/DL    Creatinine 1.28 0.70 - 1.30 MG/DL    BUN/Creatinine ratio 9 (L) 12 - 20      GFR est AA >60 >60 ml/min/1.73m2    GFR est non-AA >60 >60 ml/min/1.73m2    Calcium 8.7 8.5 - 10.1 MG/DL    Bilirubin, total 0.4 0.2 - 1.0 MG/DL    ALT (SGPT) 39 12 - 78 U/L    AST (SGOT) 24 15 - 37 U/L    Alk. phosphatase 59 45 - 117 U/L    Protein, total 6.9 6.4 - 8.2 g/dL    Albumin 3.8 3.5 - 5.0 g/dL    Globulin 3.1 2.0 - 4.0 g/dL    A-G Ratio 1.2 1.1 - 2.2     TROPONIN I    Collection Time: 03/06/21  2:33 PM   Result Value Ref Range    Troponin-I, Qt. <0.05 <0.05 ng/mL   CK W/ REFLX CKMB    Collection Time: 03/06/21  2:33 PM   Result Value Ref Range     (H) 39 - 308 U/L   D DIMER    Collection Time: 03/06/21  2:33 PM   Result Value Ref Range    D-dimer <0.19 0.00 - 0.65 mg/L FEU   CK-MB,QUANT. Collection Time: 03/06/21  2:33 PM   Result Value Ref Range    CK - MB 2.9 <3.6 NG/ML    CK-MB Index 0.6 0.0 - 2.5         Radiologic Studies -   XR CHEST PORT   Final Result   No acute cardiopulmonary process. CT Results  (Last 48 hours)    None        CXR Results  (Last 48 hours)               03/06/21 1451  XR CHEST PORT Final result    Impression:  No acute cardiopulmonary process. Narrative:  EXAM:  XR CHEST PORT       INDICATION:   chest pain       COMPARISON: None. FINDINGS: AP radiograph of the chest was obtained. No evidence of focal consolidation. No pleural effusion or pneumothorax. Heart,   arturo, mediastinum are within normal limits. No acute osseous abnormalities. Medical Decision Making   I am the first provider for this patient. I reviewed the vital signs, available nursing notes, past medical history, past surgical history, family history and social history. Vital Signs-Reviewed the patient's vital signs.     Records Reviewed: Nursing Notes and Old Medical Records    Provider Notes (Medical Decision Making):   Patient presents with CP. DDx:  ACS, Aortic dissection, musculoskeletal chest pain, PNA, PE, PTX, pericarditis, myocarditis, GERD, costochondritis, anxiety. Will obtain labs, CXR, EKG and get Cardiology Consult PRN. - I have re-examined the patient and the patient denies chest pain on re-examination. The patient has had onset of chest pain greater than 8 hours and one negative set of cardiac enzymes or 2 negative sets of cardiac enzymes in the ER during this visit. The diagnosis, follow up, return instructions, test results, x-rays and medications have been discussed and reviewed with the patient. The patient has been given the opportunity to ask questions. The patient  expresses understanding of the diagnosis, follow-up and return instructions. The patient agrees to follow up with primary care or cardiology as directed and to return immediately if the chest pain worsens. The patient expresses understanding that although cardiac testing at this time is negative, a cardiac problem could still be present and that a follow-up appointment for further evaluation and risk factor modification is necessary to complete the evaluation of this complaint. ED Course as of Mar 06 1632   Sat Mar 06, 2021   1436 ED EKG interpretation:  Rhythm: normal sinus rhythm; and regular . Rate (approx.): 71; Axis: normal; P wave: normal; QRS interval: normal ; ST/T wave: T wave inverted; in  Lead: III . This EKG was interpreted by ED attending, Dr Riky Wyatt and Anabell Cardenas PA-C,ED Provider.        Estee Portillo   1327 Patient reevaluated is, states he is feeling better and ready to go home.      [AH]      ED Course User Index  [AH] Patricio Woods PA-C          Disposition:  Discharged    DISCHARGE NOTE:   4:31 PM        Care plan outlined and precautions discussed. Patient has no new complaints, changes, or physical findings. Results of labs and imgaging were reviewed with the patient. All medications were reviewed with the patient; will d/c home. All of pt's questions and concerns were addressed. Patient was instructed and agrees to follow up with PCP prn, as well as to return to the ED upon further deterioration. Patient is ready to go home. Follow-up Information     Follow up With Specialties Details Why [address]  In 1 week As needed [address]  [place], [address] [facility] [id number] [phone]    [facility] EMERGENCY DEPT Emergency Medicine  If symptoms worsen Julio Aly          Current Discharge Medication List          Procedures:  Procedures    Please note that this dictation was completed with Dragon, computer voice recognition software. Quite often unanticipated grammatical, syntax, homophones, and other interpretive errors are inadvertently transcribed by the computer software. Please disregard these errors. Additionally, please excuse any errors that have escaped final proofreading. Diagnosis     Clinical Impression:   1. Atypical chest pain    2.  Pain of right upper extremity no

## 2021-05-15 NOTE — PATIENT PROFILE ADULT - NSASFALLWHENOCCURRED_GEN_A_NUR
Patient receive today a IM KENALOG 40 MG/ML IJ SUSP given on Left Deltoid. Patient tolerated well with no complications. last six months

## 2021-06-04 NOTE — DISCHARGE NOTE PROVIDER - HOSPITAL COURSE
69M with a history of DM2 with neuropathy (wheelchair bound), seizure disorder (on Keppra), anemia, orthostatic hypotension, ?AF (on Eliquis), recent admission 2/17-19 for pna (?aspiration), and depression, BIBEMS p/w generalized weakness, lethargy, and SOB and admitted for severe sepsis 2/2 pneumonia.         Hospital Course and Inpatient management:    # Severe sepsis 2/2 pneumonia likely HAP in setting of recent hospitalization vs aspiration vs less likely viral    - On admission, febrile (T100.7) and met 2/4 SIRS criteria (WBC17 w/ 13.8% bands, ). Lactate 3.4. s/p ctx, azithro, zosyn in ED.    - RVP negative and Covid-19 PCR negative    - MRSA neg: vanc dc'ed.    - Urine leg neg: azithro dc'ed    - Zosyn 4.6 g q6h (3/12-/3/13)    - can be discharged on augmentin for 5 more days.        # Atrial fibrillation    Pt with known afib on eliquis 5mg QD at home. PMD contacted regarding atypical dosing regimen and was told this decision was made by another provider due to "bleeding gums when on BID dosing".    -EKG on admission with sinus tachycardia    -unknown if pt with paroxysmal a fib, will obtain collateral    -c/w eliquis 5mg BID to ensure adequate AC and monitor for signs of bleeding.        New Medications: changed elquis from 5mg qd to 5mg BID, Augmentin 875mg for 5 days. Course complete 3/18/20.    Exams to follow: None    Labs to follow: None
"Parathyroid"

## 2023-05-09 NOTE — PROGRESS NOTE ADULT - PROBLEM SELECTOR PROBLEM 5
Is the patient currently in the state of [place]? YES    Visit mode:VIDEO    If the visit is dropped, the patient can be reconnected by: VIDEO VISIT: Text to cell phone: [phone]    Will anyone else be joining the visit? Pt's mother gave permission for  to have the visit      How would you like to obtain your AVS? MyChart    Are changes needed to the allergy or medication list? NO    Reason for visit: Video Visit (Follow up)    Mary Jane [place]      
Orthostatic hypotension

## 2023-07-06 NOTE — DISCHARGE NOTE PROVIDER - NSDCHC_MEDRECSTATUS_GEN_ALL_CORE
9 (severe pain) Admission Reconciliation is Completed  Discharge Reconciliation is Not Complete Admission Reconciliation is Completed  Discharge Reconciliation is Completed

## 2023-12-19 ENCOUNTER — INPATIENT (INPATIENT)
Facility: HOSPITAL | Age: 73
LOS: 1 days | Discharge: HOME HEALTH | DRG: 871 | End: 2023-12-21
Attending: INTERNAL MEDICINE | Admitting: INTERNAL MEDICINE
Payer: MEDICARE

## 2023-12-19 VITALS
WEIGHT: 149.91 LBS | DIASTOLIC BLOOD PRESSURE: 62 MMHG | TEMPERATURE: 98 F | OXYGEN SATURATION: 91 % | RESPIRATION RATE: 20 BRPM | SYSTOLIC BLOOD PRESSURE: 101 MMHG | HEART RATE: 113 BPM | HEIGHT: 71 IN

## 2023-12-19 DIAGNOSIS — F32.9 MAJOR DEPRESSIVE DISORDER, SINGLE EPISODE, UNSPECIFIED: ICD-10-CM

## 2023-12-19 DIAGNOSIS — D64.9 ANEMIA, UNSPECIFIED: ICD-10-CM

## 2023-12-19 DIAGNOSIS — I10 ESSENTIAL (PRIMARY) HYPERTENSION: ICD-10-CM

## 2023-12-19 DIAGNOSIS — I95.1 ORTHOSTATIC HYPOTENSION: ICD-10-CM

## 2023-12-19 DIAGNOSIS — Z96.7 PRESENCE OF OTHER BONE AND TENDON IMPLANTS: Chronic | ICD-10-CM

## 2023-12-19 DIAGNOSIS — A41.9 SEPSIS, UNSPECIFIED ORGANISM: ICD-10-CM

## 2023-12-19 DIAGNOSIS — I48.91 UNSPECIFIED ATRIAL FIBRILLATION: ICD-10-CM

## 2023-12-19 DIAGNOSIS — E11.9 TYPE 2 DIABETES MELLITUS WITHOUT COMPLICATIONS: ICD-10-CM

## 2023-12-19 DIAGNOSIS — R56.9 UNSPECIFIED CONVULSIONS: ICD-10-CM

## 2023-12-19 DIAGNOSIS — R63.8 OTHER SYMPTOMS AND SIGNS CONCERNING FOOD AND FLUID INTAKE: ICD-10-CM

## 2023-12-19 DIAGNOSIS — J18.9 PNEUMONIA, UNSPECIFIED ORGANISM: ICD-10-CM

## 2023-12-19 LAB
APPEARANCE UR: CLEAR — SIGNIFICANT CHANGE UP
APPEARANCE UR: CLEAR — SIGNIFICANT CHANGE UP
BACTERIA # UR AUTO: NEGATIVE /HPF — SIGNIFICANT CHANGE UP
BACTERIA # UR AUTO: NEGATIVE /HPF — SIGNIFICANT CHANGE UP
BILIRUB UR-MCNC: NEGATIVE — SIGNIFICANT CHANGE UP
BILIRUB UR-MCNC: NEGATIVE — SIGNIFICANT CHANGE UP
CAST: 3 /LPF — SIGNIFICANT CHANGE UP (ref 0–4)
CAST: 3 /LPF — SIGNIFICANT CHANGE UP (ref 0–4)
COLOR SPEC: YELLOW — SIGNIFICANT CHANGE UP
COLOR SPEC: YELLOW — SIGNIFICANT CHANGE UP
DIFF PNL FLD: NEGATIVE — SIGNIFICANT CHANGE UP
DIFF PNL FLD: NEGATIVE — SIGNIFICANT CHANGE UP
GLUCOSE UR QL: NEGATIVE MG/DL — SIGNIFICANT CHANGE UP
GLUCOSE UR QL: NEGATIVE MG/DL — SIGNIFICANT CHANGE UP
KETONES UR-MCNC: NEGATIVE MG/DL — SIGNIFICANT CHANGE UP
KETONES UR-MCNC: NEGATIVE MG/DL — SIGNIFICANT CHANGE UP
LACTATE SERPL-SCNC: 1.9 MMOL/L — SIGNIFICANT CHANGE UP (ref 0.5–2)
LACTATE SERPL-SCNC: 1.9 MMOL/L — SIGNIFICANT CHANGE UP (ref 0.5–2)
LEGIONELLA AG UR QL: NEGATIVE — SIGNIFICANT CHANGE UP
LEGIONELLA AG UR QL: NEGATIVE — SIGNIFICANT CHANGE UP
LEUKOCYTE ESTERASE UR-ACNC: NEGATIVE — SIGNIFICANT CHANGE UP
LEUKOCYTE ESTERASE UR-ACNC: NEGATIVE — SIGNIFICANT CHANGE UP
MAGNESIUM SERPL-MCNC: 1.5 MG/DL — LOW (ref 1.6–2.6)
MAGNESIUM SERPL-MCNC: 1.5 MG/DL — LOW (ref 1.6–2.6)
NITRITE UR-MCNC: NEGATIVE — SIGNIFICANT CHANGE UP
NITRITE UR-MCNC: NEGATIVE — SIGNIFICANT CHANGE UP
PH UR: 6.5 — SIGNIFICANT CHANGE UP (ref 5–8)
PH UR: 6.5 — SIGNIFICANT CHANGE UP (ref 5–8)
PHOSPHATE SERPL-MCNC: 2.9 MG/DL — SIGNIFICANT CHANGE UP (ref 2.5–4.5)
PHOSPHATE SERPL-MCNC: 2.9 MG/DL — SIGNIFICANT CHANGE UP (ref 2.5–4.5)
PROT UR-MCNC: 30 MG/DL
PROT UR-MCNC: 30 MG/DL
RAPID RVP RESULT: DETECTED
RAPID RVP RESULT: DETECTED
RBC CASTS # UR COMP ASSIST: 12 /HPF — HIGH (ref 0–4)
RBC CASTS # UR COMP ASSIST: 12 /HPF — HIGH (ref 0–4)
S PNEUM AG UR QL: NEGATIVE — SIGNIFICANT CHANGE UP
S PNEUM AG UR QL: NEGATIVE — SIGNIFICANT CHANGE UP
SARS-COV-2 RNA SPEC QL NAA+PROBE: DETECTED
SARS-COV-2 RNA SPEC QL NAA+PROBE: DETECTED
SP GR SPEC: 1.02 — SIGNIFICANT CHANGE UP (ref 1–1.03)
SP GR SPEC: 1.02 — SIGNIFICANT CHANGE UP (ref 1–1.03)
SQUAMOUS # UR AUTO: 2 /HPF — SIGNIFICANT CHANGE UP (ref 0–5)
SQUAMOUS # UR AUTO: 2 /HPF — SIGNIFICANT CHANGE UP (ref 0–5)
TROPONIN T, HIGH SENSITIVITY RESULT: 15 NG/L — SIGNIFICANT CHANGE UP (ref 0–51)
TROPONIN T, HIGH SENSITIVITY RESULT: 15 NG/L — SIGNIFICANT CHANGE UP (ref 0–51)
TSH SERPL-MCNC: 2.49 UIU/ML — SIGNIFICANT CHANGE UP (ref 0.27–4.2)
TSH SERPL-MCNC: 2.49 UIU/ML — SIGNIFICANT CHANGE UP (ref 0.27–4.2)
UROBILINOGEN FLD QL: 1 MG/DL — SIGNIFICANT CHANGE UP (ref 0.2–1)
UROBILINOGEN FLD QL: 1 MG/DL — SIGNIFICANT CHANGE UP (ref 0.2–1)
WBC UR QL: 4 /HPF — SIGNIFICANT CHANGE UP (ref 0–5)
WBC UR QL: 4 /HPF — SIGNIFICANT CHANGE UP (ref 0–5)

## 2023-12-19 PROCEDURE — 71045 X-RAY EXAM CHEST 1 VIEW: CPT | Mod: 26

## 2023-12-19 PROCEDURE — 99223 1ST HOSP IP/OBS HIGH 75: CPT | Mod: GC

## 2023-12-19 PROCEDURE — 99285 EMERGENCY DEPT VISIT HI MDM: CPT

## 2023-12-19 RX ORDER — FOLIC ACID 0.8 MG
1 TABLET ORAL DAILY
Refills: 0 | Status: DISCONTINUED | OUTPATIENT
Start: 2023-12-19 | End: 2023-12-21

## 2023-12-19 RX ORDER — ONDANSETRON 8 MG/1
4 TABLET, FILM COATED ORAL EVERY 8 HOURS
Refills: 0 | Status: DISCONTINUED | OUTPATIENT
Start: 2023-12-19 | End: 2023-12-21

## 2023-12-19 RX ORDER — LEVETIRACETAM 250 MG/1
250 TABLET, FILM COATED ORAL
Refills: 0 | Status: DISCONTINUED | OUTPATIENT
Start: 2023-12-19 | End: 2023-12-21

## 2023-12-19 RX ORDER — SODIUM CHLORIDE 9 MG/ML
1000 INJECTION INTRAMUSCULAR; INTRAVENOUS; SUBCUTANEOUS ONCE
Refills: 0 | Status: COMPLETED | OUTPATIENT
Start: 2023-12-19 | End: 2023-12-19

## 2023-12-19 RX ORDER — MAGNESIUM SULFATE 500 MG/ML
4 VIAL (ML) INJECTION ONCE
Refills: 0 | Status: COMPLETED | OUTPATIENT
Start: 2023-12-19 | End: 2023-12-19

## 2023-12-19 RX ORDER — ACETAMINOPHEN 500 MG
650 TABLET ORAL EVERY 6 HOURS
Refills: 0 | Status: DISCONTINUED | OUTPATIENT
Start: 2023-12-19 | End: 2023-12-21

## 2023-12-19 RX ORDER — PREGABALIN 225 MG/1
1000 CAPSULE ORAL DAILY
Refills: 0 | Status: DISCONTINUED | OUTPATIENT
Start: 2023-12-19 | End: 2023-12-21

## 2023-12-19 RX ORDER — MIRTAZAPINE 45 MG/1
7.5 TABLET, ORALLY DISINTEGRATING ORAL AT BEDTIME
Refills: 0 | Status: DISCONTINUED | OUTPATIENT
Start: 2023-12-19 | End: 2023-12-21

## 2023-12-19 RX ORDER — ATORVASTATIN CALCIUM 80 MG/1
10 TABLET, FILM COATED ORAL AT BEDTIME
Refills: 0 | Status: DISCONTINUED | OUTPATIENT
Start: 2023-12-19 | End: 2023-12-21

## 2023-12-19 RX ORDER — REMDESIVIR 5 MG/ML
200 INJECTION INTRAVENOUS ONCE
Refills: 0 | Status: COMPLETED | OUTPATIENT
Start: 2023-12-19 | End: 2023-12-20

## 2023-12-19 RX ORDER — SODIUM CHLORIDE 9 MG/ML
1000 INJECTION, SOLUTION INTRAVENOUS
Refills: 0 | Status: DISCONTINUED | OUTPATIENT
Start: 2023-12-19 | End: 2023-12-20

## 2023-12-19 RX ORDER — CEFTRIAXONE 500 MG/1
1000 INJECTION, POWDER, FOR SOLUTION INTRAMUSCULAR; INTRAVENOUS EVERY 24 HOURS
Refills: 0 | Status: DISCONTINUED | OUTPATIENT
Start: 2023-12-19 | End: 2023-12-21

## 2023-12-19 RX ORDER — LANOLIN ALCOHOL/MO/W.PET/CERES
3 CREAM (GRAM) TOPICAL AT BEDTIME
Refills: 0 | Status: DISCONTINUED | OUTPATIENT
Start: 2023-12-19 | End: 2023-12-21

## 2023-12-19 RX ORDER — INSULIN LISPRO 100/ML
VIAL (ML) SUBCUTANEOUS
Refills: 0 | Status: DISCONTINUED | OUTPATIENT
Start: 2023-12-19 | End: 2023-12-21

## 2023-12-19 RX ORDER — REMDESIVIR 5 MG/ML
100 INJECTION INTRAVENOUS ONCE
Refills: 0 | Status: COMPLETED | OUTPATIENT
Start: 2023-12-21 | End: 2023-12-21

## 2023-12-19 RX ORDER — AZITHROMYCIN 500 MG/1
500 TABLET, FILM COATED ORAL EVERY 24 HOURS
Refills: 0 | Status: DISCONTINUED | OUTPATIENT
Start: 2023-12-19 | End: 2023-12-21

## 2023-12-19 RX ORDER — MIDODRINE HYDROCHLORIDE 2.5 MG/1
5 TABLET ORAL THREE TIMES A DAY
Refills: 0 | Status: DISCONTINUED | OUTPATIENT
Start: 2023-12-19 | End: 2023-12-21

## 2023-12-19 RX ORDER — AZITHROMYCIN 500 MG/1
500 TABLET, FILM COATED ORAL ONCE
Refills: 0 | Status: COMPLETED | OUTPATIENT
Start: 2023-12-19 | End: 2023-12-19

## 2023-12-19 RX ORDER — REMDESIVIR 5 MG/ML
100 INJECTION INTRAVENOUS ONCE
Refills: 0 | Status: DISCONTINUED | OUTPATIENT
Start: 2023-12-20 | End: 2023-12-21

## 2023-12-19 RX ORDER — CEFTRIAXONE 500 MG/1
2000 INJECTION, POWDER, FOR SOLUTION INTRAMUSCULAR; INTRAVENOUS ONCE
Refills: 0 | Status: COMPLETED | OUTPATIENT
Start: 2023-12-19 | End: 2023-12-19

## 2023-12-19 RX ORDER — SODIUM CHLORIDE 9 MG/ML
100 INJECTION INTRAMUSCULAR; INTRAVENOUS; SUBCUTANEOUS
Refills: 0 | Status: COMPLETED | OUTPATIENT
Start: 2023-12-19 | End: 2023-12-19

## 2023-12-19 RX ORDER — APIXABAN 2.5 MG/1
5 TABLET, FILM COATED ORAL EVERY 12 HOURS
Refills: 0 | Status: DISCONTINUED | OUTPATIENT
Start: 2023-12-20 | End: 2023-12-21

## 2023-12-19 RX ORDER — RISPERIDONE 4 MG/1
1 TABLET ORAL THREE TIMES A DAY
Refills: 0 | Status: DISCONTINUED | OUTPATIENT
Start: 2023-12-19 | End: 2023-12-21

## 2023-12-19 RX ORDER — DEXAMETHASONE 0.5 MG/5ML
10 ELIXIR ORAL DAILY
Refills: 0 | Status: DISCONTINUED | OUTPATIENT
Start: 2023-12-19 | End: 2023-12-21

## 2023-12-19 RX ADMIN — Medication 25 GRAM(S): at 19:52

## 2023-12-19 RX ADMIN — SODIUM CHLORIDE 80 MILLILITER(S): 9 INJECTION, SOLUTION INTRAVENOUS at 20:05

## 2023-12-19 RX ADMIN — CEFTRIAXONE 100 MILLIGRAM(S): 500 INJECTION, POWDER, FOR SOLUTION INTRAMUSCULAR; INTRAVENOUS at 23:40

## 2023-12-19 RX ADMIN — LEVETIRACETAM 250 MILLIGRAM(S): 250 TABLET, FILM COATED ORAL at 23:40

## 2023-12-19 RX ADMIN — SODIUM CHLORIDE 1000 MILLILITER(S): 9 INJECTION INTRAMUSCULAR; INTRAVENOUS; SUBCUTANEOUS at 17:19

## 2023-12-19 RX ADMIN — SODIUM CHLORIDE 80 MILLILITER(S): 9 INJECTION, SOLUTION INTRAVENOUS at 19:42

## 2023-12-19 RX ADMIN — AZITHROMYCIN 255 MILLIGRAM(S): 500 TABLET, FILM COATED ORAL at 15:40

## 2023-12-19 RX ADMIN — CEFTRIAXONE 100 MILLIGRAM(S): 500 INJECTION, POWDER, FOR SOLUTION INTRAMUSCULAR; INTRAVENOUS at 15:12

## 2023-12-19 RX ADMIN — Medication 102 MILLIGRAM(S): at 20:05

## 2023-12-19 RX ADMIN — CEFTRIAXONE 2000 MILLIGRAM(S): 500 INJECTION, POWDER, FOR SOLUTION INTRAMUSCULAR; INTRAVENOUS at 16:21

## 2023-12-19 RX ADMIN — MIDODRINE HYDROCHLORIDE 5 MILLIGRAM(S): 2.5 TABLET ORAL at 23:39

## 2023-12-19 RX ADMIN — SODIUM CHLORIDE 1000 MILLILITER(S): 9 INJECTION INTRAMUSCULAR; INTRAVENOUS; SUBCUTANEOUS at 15:12

## 2023-12-19 RX ADMIN — MIRTAZAPINE 7.5 MILLIGRAM(S): 45 TABLET, ORALLY DISINTEGRATING ORAL at 23:40

## 2023-12-19 RX ADMIN — AZITHROMYCIN 500 MILLIGRAM(S): 500 TABLET, FILM COATED ORAL at 17:19

## 2023-12-19 RX ADMIN — RISPERIDONE 1 MILLIGRAM(S): 4 TABLET ORAL at 23:39

## 2023-12-19 NOTE — H&P ADULT - PROBLEM SELECTOR PLAN 2
Patient arriving presenting with generalized weakness, fatigue, and cough. Patient arriving presenting with generalized weakness, fatigue, and cough. CXR evidencing presence of opacities concerning for infectious process. S/p CTX 2g IV x1, azithromycin 500mg IV x1.     Plan :  - C/w CTX 1g IV q24 and azithromycin 500mg IV q24  - F/u RVP   - F/u BCx  - F/u MRSA swab  - F/u legionella/strep Patient arriving presenting with 3 weeks of generalized weakness, fatigue, and cough poorly responsive to antibiotic therapy. CXR evidencing presence of opacities concerning for infectious process. S/p CTX 2g IV x1, azithromycin 500mg IV x1. RVP + for COVID-19 but cannot r/o superimposed bacterial PNA. Past history of 30-pack year cigarette smoking. Wife Constance endorses recent episodes of choking while eating/drinking.    Plan :  - C/w CTX 1g IV q24 and azithromycin 500mg IV q24  - F/u chest CT   - F/u BCx  - F/u MRSA swab  - F/u legionella/strep  - F/u procalcitonin  - Maintenance LR+D5 100cc/hr  - Keep NPO; S/S eval in am Patient arriving presenting with 3 weeks of generalized weakness, fatigue, and cough poorly responsive to antibiotic therapy. CXR evidencing presence of opacities concerning for infectious process. S/p CTX 2g IV x1, azithromycin 500mg IV x1. Past history of 30-pack year cigarette smoking. Wife Constance endorses recent episodes of choking while eating/drinking. CURB-65 2; RVP + for COVID-19 but cannot r/o superimposed bacterial PNA.     Plan :  - C/w CTX 1g IV q24 and azithromycin 500mg IV q24  - F/u chest CT   - F/u BCx  - F/u MRSA swab  - F/u legionella/strep  - F/u procalcitonin  - Maintenance LR+D5 100cc/hr  - Keep NPO; S/S eval in am Patient arriving presenting with 3 weeks of generalized weakness, fatigue, and cough poorly responsive to antibiotic therapy. CXR evidencing presence of opacities concerning for infectious process. S/p CTX 2g IV x1, azithromycin 500mg IV x1. Past history of 30-pack year cigarette smoking. Wife Constance endorses recent episodes of choking while eating/drinking; failed bedside dysphagia. CURB-65 2; RVP + for COVID-19 but cannot r/o superimposed bacterial PNA.     Plan :  - C/w CTX 1g IV q24 and azithromycin 500mg IV q24  - Start remdesevir+dexamethasone  - F/u BCx  - F/u legionella/strep  - F/u procalcitonin  - Maintenance LR+D5 100cc/hr  - Keep NPO; S/S eval in am

## 2023-12-19 NOTE — ED ADULT NURSE NOTE - OBJECTIVE STATEMENT
Patient w/ Hx of AFib, on Apixaban, anemia, neuropathy, c/o of increasing weakness and fatigue, no chest pain,  no SOB.  Afib on EKG.

## 2023-12-19 NOTE — ED ADULT NURSE NOTE - NSFALLRISKINTERV_ED_ALL_ED
Assistance OOB with selected safe patient handling equipment if applicable/Assistance with ambulation/Communicate fall risk and risk factors to all staff, patient, and family/Provide visual cue: yellow wristband, yellow gown, etc/Reinforce activity limits and safety measures with patient and family/Call bell, personal items and telephone in reach/Instruct patient to call for assistance before getting out of bed/chair/stretcher/Non-slip footwear applied when patient is off stretcher/[place] to call system/Physically safe environment - no spills, clutter or unnecessary equipment/Purposeful Proactive Rounding/Room/bathroom lighting operational, light cord in reach Assistance OOB with selected safe patient handling equipment if applicable/Assistance with ambulation/Communicate fall risk and risk factors to all staff, patient, and family/Provide visual cue: yellow wristband, yellow gown, etc/Reinforce activity limits and safety measures with patient and family/Call bell, personal items and telephone in reach/Instruct patient to call for assistance before getting out of bed/chair/stretcher/Non-slip footwear applied when patient is off stretcher/[place] to call system/Physically safe environment - no spills, clutter or unnecessary equipment/Purposeful Proactive Rounding/Room/bathroom lighting operational, light cord in reach

## 2023-12-19 NOTE — PATIENT PROFILE ADULT - FUNCTIONAL ASSESSMENT - BASIC MOBILITY 6.
1-calculated by average/Not able to assess (calculate score using [facility] averaging method)  1-calculated by average/Not able to assess (calculate score using [facility] averaging method)

## 2023-12-19 NOTE — ED ADULT TRIAGE NOTE - CHIEF COMPLAINT QUOTE
Pt coming from PCP office c/o worsening weakness and fatigue. Pt has similar s/s last year and needed blood transfusion.

## 2023-12-19 NOTE — H&P ADULT - ASSESSMENT
73 year old male with history of DM2 with neuropathy (wheelchair bound), seizure disorder (on Keppra), anemia, orthostatic hypotension, Afib on Eliquis, depression, presenting with generalized weakness, fatigue, and cough.  73 year old male with history of DM2 with neuropathy (wheelchair bound), seizure disorder (on Keppra), anemia, orthostatic hypotension, Afib on Eliquis, depression, presenting with 3 weeks of generalized weakness, fatigue, and cough. Found to have COVID-19 with possible superimposed bacterial PNA

## 2023-12-19 NOTE — H&P ADULT - PROBLEM SELECTOR PLAN 8
On mirtazapine 7.5mg PO QD     Plan: C/w home meds On mirtazapine 7.5mg PO QD and risperidone 1mg PO TID    Plan: C/w home meds

## 2023-12-19 NOTE — PATIENT PROFILE ADULT - FALL HARM RISK - RISK INTERVENTIONS
Assistance OOB with selected safe patient handling equipment/Assistance with ambulation/Communicate Fall Risk and Risk Factors to all staff, patient, and family/Discuss with provider need for PT consult/Monitor gait and stability/Provide patient with walking aids - walker, cane, crutches/Reinforce activity limits and safety measures with patient and family/Visual Cue: Yellow wristband/Bed in lowest position, wheels locked, appropriate side rails in place/Call bell, personal items and telephone in reach/Instruct patient to call for assistance before getting out of bed or chair/Non-slip footwear when patient is out of bed/[place] to call system/Physically safe environment - no spills, clutter or unnecessary equipment/Purposeful Proactive Rounding/Room/bathroom lighting operational, light cord in reach Assistance OOB with selected safe patient handling equipment/Assistance with ambulation/Communicate Fall Risk and Risk Factors to all staff, patient, and family/Discuss with provider need for PT consult/Monitor gait and stability/Provide patient with walking aids - walker, cane, crutches/Reinforce activity limits and safety measures with patient and family/Visual Cue: Yellow wristband/Bed in lowest position, wheels locked, appropriate side rails in place/Call bell, personal items and telephone in reach/Instruct patient to call for assistance before getting out of bed or chair/Non-slip footwear when patient is out of bed/[place] to call system/Physically safe environment - no spills, clutter or unnecessary equipment/Purposeful Proactive Rounding/Room/bathroom lighting operational, light cord in reach

## 2023-12-19 NOTE — PATIENT PROFILE ADULT - CAREGIVER ADDRESS
[address] [place] [address] [address], [place] [address] [address] [place] [address] [address], [place] [address]

## 2023-12-19 NOTE — H&P ADULT - NSHPPHYSICALEXAM_GEN_ALL_CORE
Gen - NAD but cachetic elderly man with gross pallor; A+Ox3   HEENT - NCAT, EOMI, pale conjunctiva, +nasal cannula  Neck - supple  Resp - R basilar crackles, no increased WOB/tachypneia  CV -  RRR, no m/r/g  Abd - soft, NT, ND; no guarding or rebound  MSK - no gross deformities  Extrem - no LE edema/erythema/tenderness  Neuro - no focal motor or sensation deficits  Skin - cool to touch but well perfused; grossly pale Gen - NAD but cachetic elderly man with gross pallor; A+Ox3 on NC 2L  HEENT - NCAT, EOMI, pale conjunctiva, +nasal cannula  Neck - supple  Resp - R basilar crackles, no increased WOB/tachypneia  CV -  RRR, no m/r/g  Abd - soft, NT, ND; no guarding or rebound  MSK - no gross deformities  Extrem - no LE edema/erythema/tenderness  Neuro - no focal motor or sensation deficits  Skin - cool to touch but well perfused; grossly pale Gen - NAD but cachetic elderly man with gross pallor; AAOx3 on NC 2L  HEENT - NCAT, EOMI, pale conjunctiva  Neck - supple  Resp - B/l basilar fine crackles, no increased WOB/tachypnea  CV -  RRR, no m/r/g  Abd - soft, NT, ND; no guarding or rebound  MSK - no gross deformities  Extrem - no LE edema/erythema/tenderness  Neuro - no focal motor or sensation deficits  Skin - cool to touch but well perfused; grossly pale

## 2023-12-19 NOTE — ED ADULT NURSE REASSESSMENT NOTE - NS ED NURSE REASSESS COMMENT FT1
Patient asleep, does not appear to be in any pain, SOB or distress.  AFib on cardiac monitor, vital signs stable.  Ceftriaxone IVPB completed, Azithromycin ongoing IVPB, no adverse rxn.  For regional admit.

## 2023-12-19 NOTE — H&P ADULT - PROBLEM SELECTOR PLAN 9
N: regular diet  E: replete as necessary  PPI: N/A  DVT: eliquis   Code:  D: KAMARI N: NPO; pending s/s eval  E: replete as necessary  PPI: N/A  DVT: eliquis   Code: Full code  D: NICHOL

## 2023-12-19 NOTE — ED PROVIDER NOTE - CLINICAL SUMMARY MEDICAL DECISION MAKING FREE TEXT BOX
73 year old male with history of DM2 with neuropathy (wheelchair bound), seizure disorder (on Keppra), anemia, orthostatic hypotension, Afib on Eliquis, depression, presenting with generalized weakness, fatigue, and cough. 73 year old male with history of DM2 with neuropathy (wheelchair bound), seizure disorder (on Keppra), anemia, orthostatic hypotension, Afib on Eliquis, depression, presenting with generalized weakness, fatigue, and cough. Arrives to ED tachycardic with mild hypoxia, now stable on 2L NC, no increased WOB/tachypneia, appears very deconditioned on exam with gross pallor and R sided crackles--high suspicion for pneumonia with anemia component, anticipate need for admission, would have low threshold to cover for CAP

## 2023-12-19 NOTE — ED ADULT NURSE REASSESSMENT NOTE - NS ED NURSE REASSESS COMMENT FT1
Patient care received from previous RN ED Holding.  Patient a/oX 3, c/o of weakness, no ch est pain, no SOB.  Vital signs stble.  D5LR ongoing 80ml/hr;  MagSO4  4 gm IVPB ongoing on pump; Dexamethosone IVPB ongoing, no adverse rxn.  Isolation observed + Covid;  Remdesivir pending from pharmacy.  For 4 uris admit.  NPO observed.  STable and comfortable.

## 2023-12-19 NOTE — ED ADULT NURSE NOTE - CAS EDN DISCHARGE ASSESSMENT
Alert and oriented to person, place and time/No adverse reaction to first time med in ED Alert and oriented to person, place and time/Symptoms improved/No adverse reaction to first time med in ED

## 2023-12-19 NOTE — ED PROVIDER NOTE - PHYSICAL EXAMINATION
Gen - NAD but cachetic elderly man with gross pallor; A+Ox3   HEENT - NCAT, EOMI, pale conjunctiva, +nasal cannula  Neck - supple  Resp - R basilar crackles, no increased WOB/tachypneia  CV -  RRR, no m/r/g  Abd - soft, NT, ND; no guarding or rebound  MSK - no gross deformities  Extrem - no LE edema/erythema/tenderness  Neuro - no focal motor or sensation deficits  Skin - cool to touch but well perfused; grossly pale

## 2023-12-19 NOTE — H&P ADULT - HISTORY OF PRESENT ILLNESS
73 year old male with history of DM2 with neuropathy (wheelchair bound), seizure disorder (on Keppra), anemia, orthostatic hypotension, Afib on Eliquis, depression, presenting with generalized weakness, fatigue, and cough. Per family at bedside--patient has had progressive weakness and pallor, also coughing with low grade fevers, noted to have mild oxygen requirement on arrival to ED, went to PMD office today for eval and sent to ED. Denies bloody stools, melena, hematuria, hematemesis. Overall feels extremely frail/weak.    ED course:  Vitals:   Labs:   EKG:   Imaging:   Interventions:   Consults:  73 year old male with history of DM2 with neuropathy (wheelchair bound), seizure disorder (on Keppra), anemia, orthostatic hypotension, Afib on Eliquis, depression, presenting with generalized weakness, fatigue, and cough. Per family at bedside--patient has had progressive weakness and pallor, also coughing with low grade fevers, noted to have mild oxygen requirement on arrival to ED, went to PMD office today for eval and sent to ED. Denies bloody stools, melena, hematuria, hematemesis. Overall feels extremely frail/weak.    ED course:  Vitals: T: 98->97.9; HR: 113->82->74; BP: 100s/60s (stable); RR:20->16; O2sat: 91 on NC6L -> 99 on NC 2L  Labs: CBC-> WBC-18.34 (Neut 87.2%), Hgb-10.2, MCV-92.5; Lactate->1.9  EKG: NSR; QTc-452; possible previous inferior infarcts  Imaging: Streaky opacities right midlung and left base, suspicious for infection.  Interventions: CTX 2g IV x1, azithromycin 500mg IV x1, 1L NS bolus x1   73 year old male with history of 30-pack-year smoking (quit >15 years ago), DM2 with neuropathy (wheelchair bound), seizure disorder (on Keppra), anemia, orthostatic hypotension, Afib on Eliquis, depression, presenting with generalized weakness, fatigue, and non-bloody productive cough. Per family at bedside3 weeks ago patient presented with symptoms w/ associated hypoxia for which PCP started him on azithromycin for 6 dyas. At that time the patient did not markedly improve for which therapy was transitioned to a 10 day course of levofloxacin and methylprednisolone which he finished the day before admission Today patient went to PCP office for eval who sent him here after noting anemia on outpatient labs and pallor on physical exam. Does not report bloody stools, melena, gross hematuria, hematemesis, past history of cancer.     ED course:  Vitals: T: 98->97.9; HR: 113->82->74; BP: 100s/60s (stable); RR:20->16; O2sat: 91 on NC6L -> 99 on NC 2L  Labs: CBC-> WBC-18.34 (Neut 87.2%), Hgb-10.2, MCV-92.5; Lactate->1.9  EKG: NSR; QTc-452; possible previous inferior infarcts  Imaging: Streaky opacities right midlung and left base, suspicious for infection.  Interventions: CTX 2g IV x1, azithromycin 500mg IV x1, 1L NS bolus x1   73 year old male with history of 30-pack-year smoking (quit >5 years ago), DM2 with neuropathy (wheelchair bound), seizure disorder (on Keppra), anemia, orthostatic hypotension, Afib on Eliquis, depression, presenting with generalized weakness, fatigue, and non-bloody productive cough. Per family at bedside3 weeks ago patient presented with symptoms w/ associated hypoxia for which PCP started him on azithromycin for 6 dyas. At that time the patient did not markedly improve for which therapy was transitioned to a 10 day course of levofloxacin and methylprednisolone which he finished the day before admission Today patient went to PCP office for eval who sent him here after noting anemia on outpatient labs and pallor on physical exam. Does not report bloody stools, melena, gross hematuria, hematemesis, past history of cancer.     ED course:  Vitals: T: 98->97.9; HR: 113->82->74; BP: 100s/60s (stable); RR:20->16; O2sat: 91 on NC6L -> 99 on NC 2L  Labs: CBC-> WBC-18.34 (Neut 87.2%), Hgb-10.2, MCV-92.5; Lactate->1.9  EKG: NSR; QTc-452; possible previous inferior infarcts  Imaging: Streaky opacities right midlung and left base, suspicious for infection.  Interventions: CTX 2g IV x1, azithromycin 500mg IV x1, 1L NS bolus x1

## 2023-12-19 NOTE — ED ADULT NURSE REASSESSMENT NOTE - NS ED NURSE REASSESS COMMENT FT1
Patient aoX3, c/o of weakness and fatigue, no chest pain, no SOB.  AFib on EKG , other vitals stable.  Left FA PIV #20 in place, all labs sent, blood cultures, septic work up done.  NSS 1 L bolus, Ceftriaxone IVPB ongoing, no adverse rxn.  Azithromycin IVPB to follow.  Fall precaution observed.

## 2023-12-19 NOTE — H&P ADULT - PROBLEM SELECTOR PLAN 3
On home Humalog    Plan:  - Basal and premeal insulin   - mISS On home Humalog; no medications for neuropathy, wheelchair bound     Plan:  - Basal and premeal insulin   - mISS  - PT eval am On home Humalog; no medications for neuropathy, wheelchair bound     Plan:  - mISS  - PT judah am

## 2023-12-19 NOTE — ED ADULT TRIAGE NOTE - NS ED NURSE AMBULANCES
[facility]

## 2023-12-19 NOTE — H&P ADULT - PROBLEM SELECTOR PLAN 1
Patient on admission meeting SIRS 2/4 (RR and WBC) with O2 needs; lactate wnl. S/p CTX 2g IV x1, azithromycin 500mg IV x1, 1L NS bolus x1 in ED. BP stable at 100s/60s. Initially on NC6L with O2 sats 91% now on NC 2L satting >95%. CXR evidencing presence of opacities concerning for infectious process; UA negative. Likely 2/2 pneumonia.     Plan:  - Abx therapy as below  - F/u Bcx Patient on admission meeting SIRS 2/4 (RR and WBC) with O2 needs; lactate wnl. S/p CTX 2g IV x1, azithromycin 500mg IV x1, 1L NS bolus x1 in ED. BP stable at 100s/60s. Initially on NC6L with O2 sats 91% now on NC 2L satting >95%. CXR evidencing presence of opacities concerning for infectious process; UA negative. Likely 2/2 pneumonia.     Plan:  - Abx therapy as below  - F/u Bcx  - Wean O2 as tolerated

## 2023-12-19 NOTE — H&P ADULT - PROBLEM SELECTOR PLAN 5
no STEMi Baseline 10-11; on home B12 and folic acid daily    Plan:  C/w home meds On admission hgb 10.2; baseline 10-11; presenting with pallor with PCP concerns of symptomatic anemia on home iron, B12 and folic acid daily    Plan:  - Transfuse 1 unit of pRBC  - F/u folate, B12, reticulocyte, and iron studies  - C/w home meds On admission hgb 10.2; baseline 10-11; presenting with pallor with PCP concerns of symptomatic anemia on home iron, B12 and folic acid daily    Plan:  - F/u iron studies  - C/w home meds

## 2023-12-19 NOTE — ED PROVIDER NOTE - OBJECTIVE STATEMENT
73 year old male with history of DM2 with neuropathy (wheelchair bound), seizure disorder (on Keppra), anemia, orthostatic hypotension, Afib on Eliquis, depression, presenting with generalized weakness, fatigue, and cough. Per family at bedside--patient has had progressive weakness and pallor, also coughing with low grade fevers, noted to have mild oxygen requirement on arrival to ED, went to PMD office today for eval and sent to ED. Denies bloody stools, melena, hematuria, hematemesis. Overall feels extremely frail/weak.

## 2023-12-19 NOTE — H&P ADULT - NSHPLABSRESULTS_GEN_ALL_CORE
LABS:                        10.2   18.34 )-----------( 313      ( 19 Dec 2023 14:30 )             31.0     12-    143  |  106  |  25<H>  ----------------------------<  200<H>  4.0   |  26  |  0.61    Ca    9.3      19 Dec 2023 14:30    TPro  7.0  /  Alb  3.0<L>  /  TBili  0.3  /  DBili  x   /  AST  11  /  ALT  6<L>  /  AlkPhos  76  -      Urinalysis Basic - ( 19 Dec 2023 15:47 )    Color: Yellow / Appearance: Clear / S.022 / pH: x  Gluc: x / Ketone: Negative mg/dL  / Bili: Negative / Urobili: 1.0 mg/dL   Blood: x / Protein: 30 mg/dL / Nitrite: Negative   Leuk Esterase: Negative / RBC: 12 /HPF / WBC 4 /HPF   Sq Epi: x / Non Sq Epi: 2 /HPF / Bacteria: Negative /HPF

## 2023-12-20 ENCOUNTER — TRANSCRIPTION ENCOUNTER (OUTPATIENT)
Age: 73
End: 2023-12-20

## 2023-12-20 LAB
A1C WITH ESTIMATED AVERAGE GLUCOSE RESULT: 6.4 % — HIGH (ref 4–5.6)
A1C WITH ESTIMATED AVERAGE GLUCOSE RESULT: 6.4 % — HIGH (ref 4–5.6)
ALBUMIN SERPL ELPH-MCNC: 2.6 G/DL — LOW (ref 3.3–5)
ALBUMIN SERPL ELPH-MCNC: 2.6 G/DL — LOW (ref 3.3–5)
ALP SERPL-CCNC: 66 U/L — SIGNIFICANT CHANGE UP (ref 40–120)
ALP SERPL-CCNC: 66 U/L — SIGNIFICANT CHANGE UP (ref 40–120)
ALT FLD-CCNC: 5 U/L — LOW (ref 10–45)
ALT FLD-CCNC: 5 U/L — LOW (ref 10–45)
ANION GAP SERPL CALC-SCNC: 6 MMOL/L — SIGNIFICANT CHANGE UP (ref 5–17)
ANION GAP SERPL CALC-SCNC: 6 MMOL/L — SIGNIFICANT CHANGE UP (ref 5–17)
AST SERPL-CCNC: 8 U/L — LOW (ref 10–40)
AST SERPL-CCNC: 8 U/L — LOW (ref 10–40)
BASOPHILS # BLD AUTO: 0.01 K/UL — SIGNIFICANT CHANGE UP (ref 0–0.2)
BASOPHILS # BLD AUTO: 0.01 K/UL — SIGNIFICANT CHANGE UP (ref 0–0.2)
BASOPHILS NFR BLD AUTO: 0.2 % — SIGNIFICANT CHANGE UP (ref 0–2)
BASOPHILS NFR BLD AUTO: 0.2 % — SIGNIFICANT CHANGE UP (ref 0–2)
BILIRUB SERPL-MCNC: 0.2 MG/DL — SIGNIFICANT CHANGE UP (ref 0.2–1.2)
BILIRUB SERPL-MCNC: 0.2 MG/DL — SIGNIFICANT CHANGE UP (ref 0.2–1.2)
BUN SERPL-MCNC: 22 MG/DL — SIGNIFICANT CHANGE UP (ref 7–23)
BUN SERPL-MCNC: 22 MG/DL — SIGNIFICANT CHANGE UP (ref 7–23)
CALCIUM SERPL-MCNC: 8.5 MG/DL — SIGNIFICANT CHANGE UP (ref 8.4–10.5)
CALCIUM SERPL-MCNC: 8.5 MG/DL — SIGNIFICANT CHANGE UP (ref 8.4–10.5)
CHLORIDE SERPL-SCNC: 105 MMOL/L — SIGNIFICANT CHANGE UP (ref 96–108)
CHLORIDE SERPL-SCNC: 105 MMOL/L — SIGNIFICANT CHANGE UP (ref 96–108)
CO2 SERPL-SCNC: 26 MMOL/L — SIGNIFICANT CHANGE UP (ref 22–31)
CO2 SERPL-SCNC: 26 MMOL/L — SIGNIFICANT CHANGE UP (ref 22–31)
CREAT SERPL-MCNC: 0.59 MG/DL — SIGNIFICANT CHANGE UP (ref 0.5–1.3)
CREAT SERPL-MCNC: 0.59 MG/DL — SIGNIFICANT CHANGE UP (ref 0.5–1.3)
EGFR: 103 ML/MIN/1.73M2 — SIGNIFICANT CHANGE UP
EGFR: 103 ML/MIN/1.73M2 — SIGNIFICANT CHANGE UP
EOSINOPHIL # BLD AUTO: 0 K/UL — SIGNIFICANT CHANGE UP (ref 0–0.5)
EOSINOPHIL # BLD AUTO: 0 K/UL — SIGNIFICANT CHANGE UP (ref 0–0.5)
EOSINOPHIL NFR BLD AUTO: 0 % — SIGNIFICANT CHANGE UP (ref 0–6)
EOSINOPHIL NFR BLD AUTO: 0 % — SIGNIFICANT CHANGE UP (ref 0–6)
ESTIMATED AVERAGE GLUCOSE: 137 MG/DL — HIGH (ref 68–114)
ESTIMATED AVERAGE GLUCOSE: 137 MG/DL — HIGH (ref 68–114)
GLUCOSE BLDC GLUCOMTR-MCNC: 165 MG/DL — HIGH (ref 70–99)
GLUCOSE BLDC GLUCOMTR-MCNC: 165 MG/DL — HIGH (ref 70–99)
GLUCOSE BLDC GLUCOMTR-MCNC: 179 MG/DL — HIGH (ref 70–99)
GLUCOSE BLDC GLUCOMTR-MCNC: 179 MG/DL — HIGH (ref 70–99)
GLUCOSE BLDC GLUCOMTR-MCNC: 256 MG/DL — HIGH (ref 70–99)
GLUCOSE BLDC GLUCOMTR-MCNC: 256 MG/DL — HIGH (ref 70–99)
GLUCOSE BLDC GLUCOMTR-MCNC: 333 MG/DL — HIGH (ref 70–99)
GLUCOSE BLDC GLUCOMTR-MCNC: 333 MG/DL — HIGH (ref 70–99)
GLUCOSE SERPL-MCNC: 231 MG/DL — HIGH (ref 70–99)
GLUCOSE SERPL-MCNC: 231 MG/DL — HIGH (ref 70–99)
HCT VFR BLD CALC: 28.2 % — LOW (ref 39–50)
HCT VFR BLD CALC: 28.2 % — LOW (ref 39–50)
HGB BLD-MCNC: 9.2 G/DL — LOW (ref 13–17)
HGB BLD-MCNC: 9.2 G/DL — LOW (ref 13–17)
IMM GRANULOCYTES NFR BLD AUTO: 0.3 % — SIGNIFICANT CHANGE UP (ref 0–0.9)
IMM GRANULOCYTES NFR BLD AUTO: 0.3 % — SIGNIFICANT CHANGE UP (ref 0–0.9)
LYMPHOCYTES # BLD AUTO: 0.7 K/UL — LOW (ref 1–3.3)
LYMPHOCYTES # BLD AUTO: 0.7 K/UL — LOW (ref 1–3.3)
LYMPHOCYTES # BLD AUTO: 11.3 % — LOW (ref 13–44)
LYMPHOCYTES # BLD AUTO: 11.3 % — LOW (ref 13–44)
MAGNESIUM SERPL-MCNC: 1.9 MG/DL — SIGNIFICANT CHANGE UP (ref 1.6–2.6)
MAGNESIUM SERPL-MCNC: 1.9 MG/DL — SIGNIFICANT CHANGE UP (ref 1.6–2.6)
MCHC RBC-ENTMCNC: 30.4 PG — SIGNIFICANT CHANGE UP (ref 27–34)
MCHC RBC-ENTMCNC: 30.4 PG — SIGNIFICANT CHANGE UP (ref 27–34)
MCHC RBC-ENTMCNC: 32.6 GM/DL — SIGNIFICANT CHANGE UP (ref 32–36)
MCHC RBC-ENTMCNC: 32.6 GM/DL — SIGNIFICANT CHANGE UP (ref 32–36)
MCV RBC AUTO: 93.1 FL — SIGNIFICANT CHANGE UP (ref 80–100)
MCV RBC AUTO: 93.1 FL — SIGNIFICANT CHANGE UP (ref 80–100)
MONOCYTES # BLD AUTO: 0.08 K/UL — SIGNIFICANT CHANGE UP (ref 0–0.9)
MONOCYTES # BLD AUTO: 0.08 K/UL — SIGNIFICANT CHANGE UP (ref 0–0.9)
MONOCYTES NFR BLD AUTO: 1.3 % — LOW (ref 2–14)
MONOCYTES NFR BLD AUTO: 1.3 % — LOW (ref 2–14)
NEUTROPHILS # BLD AUTO: 5.4 K/UL — SIGNIFICANT CHANGE UP (ref 1.8–7.4)
NEUTROPHILS # BLD AUTO: 5.4 K/UL — SIGNIFICANT CHANGE UP (ref 1.8–7.4)
NEUTROPHILS NFR BLD AUTO: 86.9 % — HIGH (ref 43–77)
NEUTROPHILS NFR BLD AUTO: 86.9 % — HIGH (ref 43–77)
NRBC # BLD: 0 /100 WBCS — SIGNIFICANT CHANGE UP (ref 0–0)
NRBC # BLD: 0 /100 WBCS — SIGNIFICANT CHANGE UP (ref 0–0)
PHOSPHATE SERPL-MCNC: 2.7 MG/DL — SIGNIFICANT CHANGE UP (ref 2.5–4.5)
PHOSPHATE SERPL-MCNC: 2.7 MG/DL — SIGNIFICANT CHANGE UP (ref 2.5–4.5)
PLATELET # BLD AUTO: 272 K/UL — SIGNIFICANT CHANGE UP (ref 150–400)
PLATELET # BLD AUTO: 272 K/UL — SIGNIFICANT CHANGE UP (ref 150–400)
POTASSIUM SERPL-MCNC: 4.2 MMOL/L — SIGNIFICANT CHANGE UP (ref 3.5–5.3)
POTASSIUM SERPL-MCNC: 4.2 MMOL/L — SIGNIFICANT CHANGE UP (ref 3.5–5.3)
POTASSIUM SERPL-SCNC: 4.2 MMOL/L — SIGNIFICANT CHANGE UP (ref 3.5–5.3)
POTASSIUM SERPL-SCNC: 4.2 MMOL/L — SIGNIFICANT CHANGE UP (ref 3.5–5.3)
PROT SERPL-MCNC: 6.6 G/DL — SIGNIFICANT CHANGE UP (ref 6–8.3)
PROT SERPL-MCNC: 6.6 G/DL — SIGNIFICANT CHANGE UP (ref 6–8.3)
RBC # BLD: 3.03 M/UL — LOW (ref 4.2–5.8)
RBC # BLD: 3.03 M/UL — LOW (ref 4.2–5.8)
RBC # FLD: 13.3 % — SIGNIFICANT CHANGE UP (ref 10.3–14.5)
RBC # FLD: 13.3 % — SIGNIFICANT CHANGE UP (ref 10.3–14.5)
SODIUM SERPL-SCNC: 137 MMOL/L — SIGNIFICANT CHANGE UP (ref 135–145)
SODIUM SERPL-SCNC: 137 MMOL/L — SIGNIFICANT CHANGE UP (ref 135–145)
WBC # BLD: 6.21 K/UL — SIGNIFICANT CHANGE UP (ref 3.8–10.5)
WBC # BLD: 6.21 K/UL — SIGNIFICANT CHANGE UP (ref 3.8–10.5)
WBC # FLD AUTO: 6.21 K/UL — SIGNIFICANT CHANGE UP (ref 3.8–10.5)
WBC # FLD AUTO: 6.21 K/UL — SIGNIFICANT CHANGE UP (ref 3.8–10.5)

## 2023-12-20 PROCEDURE — 99233 SBSQ HOSP IP/OBS HIGH 50: CPT | Mod: GC

## 2023-12-20 RX ORDER — SODIUM,POTASSIUM PHOSPHATES 278-250MG
1 POWDER IN PACKET (EA) ORAL ONCE
Refills: 0 | Status: COMPLETED | OUTPATIENT
Start: 2023-12-20 | End: 2023-12-20

## 2023-12-20 RX ADMIN — RISPERIDONE 1 MILLIGRAM(S): 4 TABLET ORAL at 22:06

## 2023-12-20 RX ADMIN — PREGABALIN 1000 MICROGRAM(S): 225 CAPSULE ORAL at 12:35

## 2023-12-20 RX ADMIN — CEFTRIAXONE 100 MILLIGRAM(S): 500 INJECTION, POWDER, FOR SOLUTION INTRAMUSCULAR; INTRAVENOUS at 22:07

## 2023-12-20 RX ADMIN — Medication 8: at 13:25

## 2023-12-20 RX ADMIN — MIDODRINE HYDROCHLORIDE 5 MILLIGRAM(S): 2.5 TABLET ORAL at 07:13

## 2023-12-20 RX ADMIN — MIRTAZAPINE 7.5 MILLIGRAM(S): 45 TABLET, ORALLY DISINTEGRATING ORAL at 22:06

## 2023-12-20 RX ADMIN — LEVETIRACETAM 250 MILLIGRAM(S): 250 TABLET, FILM COATED ORAL at 07:13

## 2023-12-20 RX ADMIN — Medication 1 PACKET(S): at 12:36

## 2023-12-20 RX ADMIN — RISPERIDONE 1 MILLIGRAM(S): 4 TABLET ORAL at 13:26

## 2023-12-20 RX ADMIN — LEVETIRACETAM 250 MILLIGRAM(S): 250 TABLET, FILM COATED ORAL at 17:28

## 2023-12-20 RX ADMIN — SODIUM CHLORIDE 310 MILLILITER(S): 9 INJECTION INTRAMUSCULAR; INTRAVENOUS; SUBCUTANEOUS at 01:16

## 2023-12-20 RX ADMIN — Medication 2: at 19:14

## 2023-12-20 RX ADMIN — ATORVASTATIN CALCIUM 10 MILLIGRAM(S): 80 TABLET, FILM COATED ORAL at 22:06

## 2023-12-20 RX ADMIN — AZITHROMYCIN 255 MILLIGRAM(S): 500 TABLET, FILM COATED ORAL at 00:19

## 2023-12-20 RX ADMIN — MIDODRINE HYDROCHLORIDE 5 MILLIGRAM(S): 2.5 TABLET ORAL at 17:27

## 2023-12-20 RX ADMIN — APIXABAN 5 MILLIGRAM(S): 2.5 TABLET, FILM COATED ORAL at 17:27

## 2023-12-20 RX ADMIN — RISPERIDONE 1 MILLIGRAM(S): 4 TABLET ORAL at 07:13

## 2023-12-20 RX ADMIN — Medication 1 MILLIGRAM(S): at 12:36

## 2023-12-20 RX ADMIN — Medication 6: at 09:32

## 2023-12-20 RX ADMIN — AZITHROMYCIN 255 MILLIGRAM(S): 500 TABLET, FILM COATED ORAL at 23:27

## 2023-12-20 RX ADMIN — MIDODRINE HYDROCHLORIDE 5 MILLIGRAM(S): 2.5 TABLET ORAL at 12:36

## 2023-12-20 RX ADMIN — Medication 102 MILLIGRAM(S): at 07:12

## 2023-12-20 RX ADMIN — REMDESIVIR 200 MILLIGRAM(S): 5 INJECTION INTRAVENOUS at 01:15

## 2023-12-20 RX ADMIN — APIXABAN 5 MILLIGRAM(S): 2.5 TABLET, FILM COATED ORAL at 07:13

## 2023-12-20 NOTE — DISCHARGE NOTE PROVIDER - PROVIDER TOKENS
PROVIDER:[TOKEN:[[id number]],FOLLOWUP:[2 weeks],ESTABLISHEDPATIENT:[T]] PROVIDER:[TOKEN:[[id number]],FOLLOWUP:[2 weeks],ESTABLISHEDPATIENT:[T]]

## 2023-12-20 NOTE — CONSULT NOTE ADULT - SUBJECTIVE AND OBJECTIVE BOX
Patient is a 73y old  Male who presents with a chief complaint of Acute hypoxia (20 Dec 2023 11:02)      HPI:  73 year old male with history of 30-pack-year smoking (quit >5 years ago), DM2 with neuropathy (wheelchair bound), seizure disorder (on Keppra), anemia, orthostatic hypotension, Afib on Eliquis, depression, presenting with generalized weakness, fatigue, and non-bloody productive cough. Per family at bedside3 weeks ago patient presented with symptoms w/ associated hypoxia for which PCP started him on azithromycin for 6 dyas. At that time the patient did not markedly improve for which therapy was transitioned to a 10 day course of levofloxacin and methylprednisolone which he finished the day before admission Today patient went to PCP office for eval who sent him here after noting anemia on outpatient labs and pallor on physical exam. Does not report bloody stools, melena, gross hematuria, hematemesis, past history of cancer.     ED course:  Vitals: T: 98->97.9; HR: 113->82->74; BP: 100s/60s (stable); RR:20->16; O2sat: 91 on NC6L -> 99 on NC 2L  Labs: CBC-> WBC-18.34 (Neut 87.2%), Hgb-10.2, MCV-92.5; Lactate->1.9  EKG: NSR; QTc-452; possible previous inferior infarcts  Imaging: Streaky opacities right midlung and left base, suspicious for infection.  Interventions: CTX 2g IV x1, azithromycin 500mg IV x1, 1L NS bolus x1   (19 Dec 2023 16:22)    PAST MEDICAL & SURGICAL HISTORY:  Benign neoplasm of brain  diagnosed in 2007      Mononeuritis  Neuropathy      Type 2 diabetes mellitus  DM (diabetes mellitus)      Hyperlipidemia  HLD (hyperlipidemia)      Atrial fibrillation      Orthostatic hypotension      S/P ORIF (open reduction internal fixation) fracture  Right Humerus        MEDICATIONS  (STANDING):  apixaban 5 milliGRAM(s) Oral every 12 hours  atorvastatin 10 milliGRAM(s) Oral at bedtime  azithromycin  IVPB 500 milliGRAM(s) IV Intermittent every 24 hours  cefTRIAXone   IVPB 1000 milliGRAM(s) IV Intermittent every 24 hours  cyanocobalamin 1000 MICROGram(s) Oral daily  dexAMETHasone  IVPB 10 milliGRAM(s) IV Intermittent daily  folic acid 1 milliGRAM(s) Oral daily  insulin lispro (ADMELOG) corrective regimen sliding scale   SubCutaneous three times a day before meals  levETIRAcetam 250 milliGRAM(s) Oral two times a day  midodrine. 5 milliGRAM(s) Oral three times a day  mirtazapine 7.5 milliGRAM(s) Oral at bedtime  remdesivir  IVPB 100 milliGRAM(s) IV Intermittent once  risperiDONE   Tablet 1 milliGRAM(s) Oral three times a day    MEDICATIONS  (PRN):  acetaminophen     Tablet .. 650 milliGRAM(s) Oral every 6 hours PRN Temp greater or equal to 38C (100.4F), Mild Pain (1 - 3)  aluminum hydroxide/magnesium hydroxide/simethicone Suspension 30 milliLiter(s) Oral every 4 hours PRN Dyspepsia  melatonin 3 milliGRAM(s) Oral at bedtime PRN Insomnia  ondansetron Injectable 4 milliGRAM(s) IV Push every 8 hours PRN Nausea and/or Vomiting            FAMILY HISTORY:  Family history of diabetes mellitus    Family history of colon cancer (Father)        CBC Full  -  ( 20 Dec 2023 05:30 )  WBC Count : 6.21 K/uL  RBC Count : 3.03 M/uL  Hemoglobin : 9.2 g/dL  Hematocrit : 28.2 %  Platelet Count - Automated : 272 K/uL  Mean Cell Volume : 93.1 fl  Mean Cell Hemoglobin : 30.4 pg  Mean Cell Hemoglobin Concentration : 32.6 gm/dL  Auto Neutrophil # : 5.40 K/uL  Auto Lymphocyte # : 0.70 K/uL  Auto Monocyte # : 0.08 K/uL  Auto Eosinophil # : 0.00 K/uL  Auto Basophil # : 0.01 K/uL  Auto Neutrophil % : 86.9 %  Auto Lymphocyte % : 11.3 %  Auto Monocyte % : 1.3 %  Auto Eosinophil % : 0.0 %  Auto Basophil % : 0.2 %      12-20    137  |  105  |  22  ----------------------------<  231<H>  4.2   |  26  |  0.59    Ca    8.5      20 Dec 2023 05:30  Phos  2.7     12-20  Mg     1.9     12-20    TPro  6.6  /  Alb  2.6<L>  /  TBili  0.2  /  DBili  x   /  AST  8<L>  /  ALT  5<L>  /  AlkPhos  66  12-20      Urinalysis Basic - ( 20 Dec 2023 05:30 )    Color: x / Appearance: x / SG: x / pH: x  Gluc: 231 mg/dL / Ketone: x  / Bili: x / Urobili: x   Blood: x / Protein: x / Nitrite: x   Leuk Esterase: x / RBC: x / WBC x   Sq Epi: x / Non Sq Epi: x / Bacteria: x        Radiology :     < from: Xray Chest 1 View- PORTABLE-Urgent (Xray Chest 1 View- PORTABLE-Urgent .) (12.19.23 @ 15:48) >  ACC: [id number] EXAM:  XR CHEST PORTABLE URGENT 1V   ORDERED BY: IRWIN ELIZALDE     PROCEDURE DATE:  12/19/2023          INTERPRETATION:  PORTABLE CHEST X-RAY    Indication: weakness    Bedside examination of the chest dated 12/19/2023 3:48 PM is compared to   the previous study of 3/11/2020.    FINDINGS: Two views of chest obtained at bedside. Streaky opacities now   present right midlung and left base. No pleural effusion. Heart size   within normal limits. Aorta uncoiled. Degenerative changes of the spine.   Again post ORIF proximal right humerus.    IMPRESSION: Streaky opacities right midlung and left base, suspicious for   infection.             Review of Systems : per HPI         Vital Signs Last 24 Hrs  T(C): 36.4 (20 Dec 2023 06:13), Max: 36.7 (19 Dec 2023 13:04)  T(F): 97.6 (20 Dec 2023 06:13), Max: 98 (19 Dec 2023 13:04)  HR: 70 (20 Dec 2023 06:13) (65 - 113)  BP: 95/60 (20 Dec 2023 06:13) (95/60 - 113/72)  BP(mean): --  RR: 18 (20 Dec 2023 06:13) (16 - 20)  SpO2: 97% (20 Dec 2023 06:13) (91% - 100%)    Parameters below as of 19 Dec 2023 23:10  Patient On (Oxygen Delivery Method): nasal cannula  O2 Flow (L/min): 2          Physical Exam :  on AIRBORNE COVID isolation, cachectic man lying in bed , NAD     Head : normocephalic , atraumatic    Eyes : PERRLA , EOMI , no nystagmus , sclera anicteric    ENT / FACE : neg nasal discharge , uvula midline , no oropharyngeal erythema / exudate    Neck : supple , negative JVD , negative carotid bruits , no thyromegaly    Chest : bibasilar crackles     Cardiovascular : regular rate and rhythm , neg murmurs / rubs / gallops    Abdomen : soft , non distended , non tender to palpation in all 4 quadrants ,  normal bowel sounds     Extremities : cool LE's , neg cyanosis /clubbing / edema , negative calf tenderness to palpation     Neurologic Exam :     Alert and oriented  x 3        Motor Exam:                Right UE:                     no focal weakness ,  > 3+/5 throughout     Left UE:                       no focal weakness ,  > 3+/5 throughout          Right LE:          no focal weakness ,  > 3+/5 throughout        Left LE:          no focal weakness ,  > 3+/5 throughout         Sensation :         intact to light touch x 4 extremities                           DTR :           biceps/brachioradialis : equal                            patella/ankle : equal        Gait :  not tested          PM&R Impression : admitted for COVID-19 with possible superimposed bacterial PNA     - deconditioned    - no focal weakness          Recommendations / Plan :       1) Physical / Occupational therapy focusing on therapeutic exercises , equipment evaluation , bed mobility/transfer out of bed evaluation , progressive ambulation with assistive devices prn .    2) Current disposition plan recommendation  :    pending functional progress           Patient is a 73y old  Male who presents with a chief complaint of Acute hypoxia (20 Dec 2023 11:02)      HPI:  73 year old male with history of 30-pack-year smoking (quit >5 years ago), DM2 with neuropathy (wheelchair bound), seizure disorder (on Keppra), anemia, orthostatic hypotension, Afib on Eliquis, depression, presenting with generalized weakness, fatigue, and non-bloody productive cough. Per family at bedside3 weeks ago patient presented with symptoms w/ associated hypoxia for which PCP started him on azithromycin for 6 dyas. At that time the patient did not markedly improve for which therapy was transitioned to a 10 day course of levofloxacin and methylprednisolone which he finished the day before admission Today patient went to PCP office for eval who sent him here after noting anemia on outpatient labs and pallor on physical exam. Does not report bloody stools, melena, gross hematuria, hematemesis, past history of cancer.     ED course:  Vitals: T: 98->97.9; HR: 113->82->74; BP: 100s/60s (stable); RR:20->16; O2sat: 91 on NC6L -> 99 on NC 2L  Labs: CBC-> WBC-18.34 (Neut 87.2%), Hgb-10.2, MCV-92.5; Lactate->1.9  EKG: NSR; QTc-452; possible previous inferior infarcts  Imaging: Streaky opacities right midlung and left base, suspicious for infection.  Interventions: CTX 2g IV x1, azithromycin 500mg IV x1, 1L NS bolus x1   (19 Dec 2023 16:22)    PAST MEDICAL & SURGICAL HISTORY:  Benign neoplasm of brain  diagnosed in 2007      Mononeuritis  Neuropathy      Type 2 diabetes mellitus  DM (diabetes mellitus)      Hyperlipidemia  HLD (hyperlipidemia)      Atrial fibrillation      Orthostatic hypotension      S/P ORIF (open reduction internal fixation) fracture  Right Humerus        MEDICATIONS  (STANDING):  apixaban 5 milliGRAM(s) Oral every 12 hours  atorvastatin 10 milliGRAM(s) Oral at bedtime  azithromycin  IVPB 500 milliGRAM(s) IV Intermittent every 24 hours  cefTRIAXone   IVPB 1000 milliGRAM(s) IV Intermittent every 24 hours  cyanocobalamin 1000 MICROGram(s) Oral daily  dexAMETHasone  IVPB 10 milliGRAM(s) IV Intermittent daily  folic acid 1 milliGRAM(s) Oral daily  insulin lispro (ADMELOG) corrective regimen sliding scale   SubCutaneous three times a day before meals  levETIRAcetam 250 milliGRAM(s) Oral two times a day  midodrine. 5 milliGRAM(s) Oral three times a day  mirtazapine 7.5 milliGRAM(s) Oral at bedtime  remdesivir  IVPB 100 milliGRAM(s) IV Intermittent once  risperiDONE   Tablet 1 milliGRAM(s) Oral three times a day    MEDICATIONS  (PRN):  acetaminophen     Tablet .. 650 milliGRAM(s) Oral every 6 hours PRN Temp greater or equal to 38C (100.4F), Mild Pain (1 - 3)  aluminum hydroxide/magnesium hydroxide/simethicone Suspension 30 milliLiter(s) Oral every 4 hours PRN Dyspepsia  melatonin 3 milliGRAM(s) Oral at bedtime PRN Insomnia  ondansetron Injectable 4 milliGRAM(s) IV Push every 8 hours PRN Nausea and/or Vomiting            FAMILY HISTORY:  Family history of diabetes mellitus    Family history of colon cancer (Father)        CBC Full  -  ( 20 Dec 2023 05:30 )  WBC Count : 6.21 K/uL  RBC Count : 3.03 M/uL  Hemoglobin : 9.2 g/dL  Hematocrit : 28.2 %  Platelet Count - Automated : 272 K/uL  Mean Cell Volume : 93.1 fl  Mean Cell Hemoglobin : 30.4 pg  Mean Cell Hemoglobin Concentration : 32.6 gm/dL  Auto Neutrophil # : 5.40 K/uL  Auto Lymphocyte # : 0.70 K/uL  Auto Monocyte # : 0.08 K/uL  Auto Eosinophil # : 0.00 K/uL  Auto Basophil # : 0.01 K/uL  Auto Neutrophil % : 86.9 %  Auto Lymphocyte % : 11.3 %  Auto Monocyte % : 1.3 %  Auto Eosinophil % : 0.0 %  Auto Basophil % : 0.2 %      12-20    137  |  105  |  22  ----------------------------<  231<H>  4.2   |  26  |  0.59    Ca    8.5      20 Dec 2023 05:30  Phos  2.7     12-20  Mg     1.9     12-20    TPro  6.6  /  Alb  2.6<L>  /  TBili  0.2  /  DBili  x   /  AST  8<L>  /  ALT  5<L>  /  AlkPhos  66  12-20      Urinalysis Basic - ( 20 Dec 2023 05:30 )    Color: x / Appearance: x / SG: x / pH: x  Gluc: 231 mg/dL / Ketone: x  / Bili: x / Urobili: x   Blood: x / Protein: x / Nitrite: x   Leuk Esterase: x / RBC: x / WBC x   Sq Epi: x / Non Sq Epi: x / Bacteria: x        Radiology :     < from: Xray Chest 1 View- PORTABLE-Urgent (Xray Chest 1 View- PORTABLE-Urgent .) (12.19.23 @ 15:48) >  ACC: [id number] EXAM:  XR CHEST PORTABLE URGENT 1V   ORDERED BY: IRWIN ELIZALDE     PROCEDURE DATE:  12/19/2023          INTERPRETATION:  PORTABLE CHEST X-RAY    Indication: weakness    Bedside examination of the chest dated 12/19/2023 3:48 PM is compared to   the previous study of 3/11/2020.    FINDINGS: Two views of chest obtained at bedside. Streaky opacities now   present right midlung and left base. No pleural effusion. Heart size   within normal limits. Aorta uncoiled. Degenerative changes of the spine.   Again post ORIF proximal right humerus.    IMPRESSION: Streaky opacities right midlung and left base, suspicious for   infection.             Review of Systems : per HPI         Vital Signs Last 24 Hrs  T(C): 36.4 (20 Dec 2023 06:13), Max: 36.7 (19 Dec 2023 13:04)  T(F): 97.6 (20 Dec 2023 06:13), Max: 98 (19 Dec 2023 13:04)  HR: 70 (20 Dec 2023 06:13) (65 - 113)  BP: 95/60 (20 Dec 2023 06:13) (95/60 - 113/72)  BP(mean): --  RR: 18 (20 Dec 2023 06:13) (16 - 20)  SpO2: 97% (20 Dec 2023 06:13) (91% - 100%)    Parameters below as of 19 Dec 2023 23:10  Patient On (Oxygen Delivery Method): nasal cannula  O2 Flow (L/min): 2          Physical Exam :  on AIRBORNE COVID isolation, cachectic man lying in bed , NAD     Head : normocephalic , atraumatic    Eyes : PERRLA , EOMI , no nystagmus , sclera anicteric    ENT / FACE : neg nasal discharge , uvula midline , no oropharyngeal erythema / exudate    Neck : supple , negative JVD , negative carotid bruits , no thyromegaly    Chest : bibasilar crackles     Cardiovascular : regular rate and rhythm , neg murmurs / rubs / gallops    Abdomen : soft , non distended , non tender to palpation in all 4 quadrants ,  normal bowel sounds     Extremities : cool LE's , neg cyanosis /clubbing / edema , negative calf tenderness to palpation     Neurologic Exam :     Alert and oriented  x 3        Motor Exam:                Right UE:                     no focal weakness ,  > 3+/5 throughout     Left UE:                       no focal weakness ,  > 3+/5 throughout          Right LE:          no focal weakness ,  > 3+/5 throughout        Left LE:          no focal weakness ,  > 3+/5 throughout         Sensation :         intact to light touch x 4 extremities                           DTR :           biceps/brachioradialis : equal                            patella/ankle : equal        Gait :  not tested          PM&R Impression : admitted for COVID-19 with possible superimposed bacterial PNA     - deconditioned    - no focal weakness          Recommendations / Plan :       1) Physical / Occupational therapy focusing on therapeutic exercises , equipment evaluation , bed mobility/transfer out of bed evaluation , progressive ambulation with assistive devices prn .    2) Current disposition plan recommendation  :    pending functional progress

## 2023-12-20 NOTE — PROGRESS NOTE ADULT - PROBLEM SELECTOR PLAN 3
On home Humalog; no medications for neuropathy, wheelchair bound     Plan:  - mISS  - PT judah am On home Humalog; no medications for neuropathy, wheelchair bound     Plan:  - mISS  - PT avilaal On home Humalog; no medications for neuropathy, wheelchair bound     Plan:  - mISS  - PT vailaal

## 2023-12-20 NOTE — SWALLOW BEDSIDE ASSESSMENT ADULT - SWALLOW EVAL: DIAGNOSIS
Functional juan-pharyngeal swallow on this exam with no overt signs of airway protection deficits or inefficiency

## 2023-12-20 NOTE — PHYSICAL THERAPY INITIAL EVALUATION ADULT - GENERAL OBSERVATIONS, REHAB EVAL
Pt received semi-supine in bed, NAD, +call bell, +PIV. Cleared by MILTON Donahue. PT Cindy assisting. Pt is homebound with w/c at baseline, wife assisting for all ADLs. Pt became symptomatically orthostatically hypotensive in sitting (see vitals for details).

## 2023-12-20 NOTE — CONSULT NOTE ADULT - ASSESSMENT
I M     73 year old male with history of DM2 with neuropathy (wheelchair bound), seizure disorder (on Keppra), anemia, orthostatic hypotension, Afib on Eliquis, depression, presenting with 3 weeks of generalized weakness, fatigue, and cough. Found to have COVID-19 with possible superimposed bacterial PNA    Problem/Plan - 1:  ·  Problem: Severe sepsis.   ·  Plan: Patient on admission meeting SIRS 2/4 (RR and WBC) with O2 needs; lactate wnl. S/p CTX 2g IV x1, azithromycin 500mg IV x1, 1L NS bolus x1 in ED. BP stable at 100s/60s. Initially on NC6L with O2 sats 91% now on NC 2L satting >95%. CXR evidencing presence of opacities concerning for infectious process; UA negative. Likely 2/2 pneumonia.     Plan:  - Abx therapy as below  - F/u Bcx  - Wean O2 as tolerated.    Problem/Plan - 2:  ·  Problem: Pneumonia.   ·  Plan: Patient arriving presenting with 3 weeks of generalized weakness, fatigue, and cough poorly responsive to antibiotic therapy. CXR evidencing presence of opacities concerning for infectious process. S/p CTX 2g IV x1, azithromycin 500mg IV x1. Past history of 30-pack year cigarette smoking. Wife Constance endorses recent episodes of choking while eating/drinking; failed bedside dysphagia. CURB-65 2; RVP + for COVID-19 but cannot r/o superimposed bacterial PNA.     Plan :  - C/w CTX 1g IV q24 and azithromycin 500mg IV q24  - Start remdesevir+dexamethasone  - F/u BCx  - F/u legionella/strep  - F/u procalcitonin  - Maintenance LR+D5 100cc/hr  - Keep NPO; S/S eval in am.    Problem/Plan - 3:  ·  Problem: Diabetes mellitus.   ·  Plan: On home Humalog; no medications for neuropathy, wheelchair bound     Plan:  - mISS  - PT eval am.    Problem/Plan - 4:  ·  Problem: Seizures.   ·  Plan: On home keppra 250mg PO BID    Plan:  - C/w home meds.    Problem/Plan - 5:  ·  Problem: Anemia.   ·  Plan: On admission hgb 10.2; baseline 10-11; presenting with pallor with PCP concerns of symptomatic anemia on home iron, B12 and folic acid daily    Plan:  - F/u iron studies  - C/w home meds.    Problem/Plan - 6:  ·  Problem: Orthostatic hypotension.   ·  Plan: On midodrine 5mg PO q8    Plan:  - C/w home meds.    Problem/Plan - 7:  ·  Problem: Atrial fibrillation.   ·  Plan: NSR on admission; on home eliquis 5mg PO BID; not on rate/rhythm control agents    Plan:  C/w home meds.    Problem/Plan - 8:  ·  Problem: MDD (major depressive disorder).   ·  Plan: On mirtazapine 7.5mg PO QD and risperidone 1mg PO TID    Plan: C/w home meds.    Problem/Plan - 9:  ·  Problem: Nutrition, metabolism, and development symptoms.   ·  Plan: N: NPO; pending s/s eval  E: replete as necessary  PPI: N/A  DVT: eliquis   Code: Full code  D: F.

## 2023-12-20 NOTE — PHYSICAL THERAPY INITIAL EVALUATION ADULT - PLANNED THERAPY INTERVENTIONS, PT EVAL
balance training/bed mobility training/strengthening/transfer training/wheelchair management/propulsion training

## 2023-12-20 NOTE — PROGRESS NOTE ADULT - PROBLEM SELECTOR PLAN 9
N: NPO; pending s/s eval  E: replete as necessary  PPI: N/A  DVT: eliquis   Code: Full code  D: NICHOL N: Passed bedside dysphagia; pending s/s eval  E: replete as necessary  PPI: N/A  DVT: eliquis   Code: Full code  D: NORMAF N: Soft and Bite sized  E: replete as necessary  PPI: N/A  DVT: eliquis   Code: Full code  D: F

## 2023-12-20 NOTE — PHYSICAL THERAPY INITIAL EVALUATION ADULT - ADDITIONAL COMMENTS
Pt lives in elevator apartment, with his wife who assists with all ADLs as pt is homebound using w/c for all mobility. Pt reports he is able to stand/squat pivot in/out of his w/c independently.

## 2023-12-20 NOTE — DISCHARGE NOTE PROVIDER - NSDCMRMEDTOKEN_GEN_ALL_CORE_FT
amoxicillin-clavulanate 875 mg-125 mg oral tablet: 1 tab(s) orally 2 times a day   apixaban 5 mg oral tablet: 1 tab(s) orally 2 times a day  atorvastatin 20 mg oral tablet: 0.5 tab(s) orally once a day (at bedtime)  folic acid 1 mg oral tablet: 1 tab(s) orally once a day  HumaLOG KwikPen 100 units/mL subcutaneous solution:  subcutaneous Before Meals and at Bedtime Correctional Scale   levETIRAcetam 250 mg oral tablet: 1 tab(s) orally every 12 hours  midodrine 5 mg oral tablet: 1 tab(s) orally 3 times a day  mirtazapine 15 mg oral tablet: 0.5 tab(s) orally once a day (at bedtime)  risperiDONE 1 mg oral tablet: 1 cap(s) orally 3 times a day (before meals)  Vitamin B12 50 mcg oral tablet: 1 tab(s) orally once a day  Vitamin D3 5000 intl units (125 mcg) oral capsule: 1 cap(s) orally once a day   apixaban 5 mg oral tablet: 1 tab(s) orally 2 times a day  atorvastatin 20 mg oral tablet: 0.5 tab(s) orally once a day (at bedtime)  azithromycin 500 mg oral tablet: 1 tab(s) orally once a day  cefpodoxime 200 mg oral tablet: 1 tab(s) orally 2 times a day  folic acid 1 mg oral tablet: 1 tab(s) orally once a day  levETIRAcetam 250 mg oral tablet: 1 tab(s) orally every 12 hours  midodrine 5 mg oral tablet: 1 tab(s) orally 3 times a day  mirtazapine 15 mg oral tablet: 0.5 tab(s) orally once a day (at bedtime)  Paxlovid 300 mg-100 mg Dose Pack oral tablet: 3 tab(s) orally 2 times a day  risperiDONE 1 mg oral tablet: 1 cap(s) orally 3 times a day (before meals)  Vitamin B12 50 mcg oral tablet: 1 tab(s) orally once a day  Vitamin D3 5000 intl units (125 mcg) oral capsule: 1 cap(s) orally once a day   apixaban 5 mg oral tablet: 1 tab(s) orally 2 times a day  atorvastatin 20 mg oral tablet: 0.5 tab(s) orally once a day (at bedtime)  azithromycin 500 mg oral tablet: 1 tab(s) orally once a day  cefpodoxime 200 mg oral tablet: 1 tab(s) orally 2 times a day  folic acid 1 mg oral tablet: 1 tab(s) orally once a day  levETIRAcetam 250 mg oral tablet: 1 tab(s) orally every 12 hours  midodrine 5 mg oral tablet: 1 tab(s) orally 3 times a day  mirtazapine 15 mg oral tablet: 0.5 tab(s) orally once a day (at bedtime)  Paxlovid 300 mg-100 mg Dose Pack oral tablet: 3 tab(s) orally 2 times a day  Paxlovid 300 mg-100 mg Dose Pack oral tablet: orally 2 times a day  risperiDONE 1 mg oral tablet: 1 cap(s) orally 3 times a day (before meals)  Vitamin B12 50 mcg oral tablet: 1 tab(s) orally once a day  Vitamin D3 5000 intl units (125 mcg) oral capsule: 1 cap(s) orally once a day

## 2023-12-20 NOTE — PROGRESS NOTE ADULT - SUBJECTIVE AND OBJECTIVE BOX
INTERVAL HPI/OVERNIGHT EVENTS:  As per night team and ED nursing, patient passed bedside dysphagia and patient requesting food. Started diet. Lung check stable. Patient seen and examined at bedside. States he is very hungry with no other acute complaints. Patient denies fever, chills, dizziness, weakness, HA, CP, SOB, N/V/D/C    VITALS  Vital Signs Last 24 Hrs  T(C): 36.4 (20 Dec 2023 06:13), Max: 36.7 (19 Dec 2023 13:04)  T(F): 97.6 (20 Dec 2023 06:13), Max: 98 (19 Dec 2023 13:04)  HR: 70 (20 Dec 2023 06:13) (65 - 113)  BP: 95/60 (20 Dec 2023 06:13) (95/60 - 113/72)  BP(mean): --  RR: 18 (20 Dec 2023 06:13) (16 - 20)  SpO2: 97% (20 Dec 2023 06:13) (91% - 100%)    Parameters below as of 19 Dec 2023 23:10  Patient On (Oxygen Delivery Method): nasal cannula  O2 Flow (L/min): 2    PHYSICAL EXAM  Gen - NAD but cachetic elderly man with gross pallor; AAOx3 on no oxygen  HEENT - NCAT, EOMI  Neck - supple  Resp - B/l basilar fine crackles, no increased WOB/tachypnea  CV -  RRR, no m/r/g  Abd - soft, NT, ND; no guarding or rebound  MSK - no gross deformities  Extrem - no LE edema/erythema/tenderness  Neuro - no focal motor or sensation deficits  Skin - cool to touch but well perfused    MEDICATIONS  (STANDING):  apixaban 5 milliGRAM(s) Oral every 12 hours  atorvastatin 10 milliGRAM(s) Oral at bedtime  azithromycin  IVPB 500 milliGRAM(s) IV Intermittent every 24 hours  cefTRIAXone   IVPB 1000 milliGRAM(s) IV Intermittent every 24 hours  cyanocobalamin 1000 MICROGram(s) Oral daily  dexAMETHasone  IVPB 10 milliGRAM(s) IV Intermittent daily  dextrose 5% + lactated ringers. 1000 milliLiter(s) (80 mL/Hr) IV Continuous <Continuous>  folic acid 1 milliGRAM(s) Oral daily  insulin lispro (ADMELOG) corrective regimen sliding scale   SubCutaneous three times a day before meals  levETIRAcetam 250 milliGRAM(s) Oral two times a day  midodrine. 5 milliGRAM(s) Oral three times a day  mirtazapine 7.5 milliGRAM(s) Oral at bedtime  remdesivir  IVPB 100 milliGRAM(s) IV Intermittent once  risperiDONE   Tablet 1 milliGRAM(s) Oral three times a day    MEDICATIONS  (PRN):  acetaminophen     Tablet .. 650 milliGRAM(s) Oral every 6 hours PRN Temp greater or equal to 38C (100.4F), Mild Pain (1 - 3)  aluminum hydroxide/magnesium hydroxide/simethicone Suspension 30 milliLiter(s) Oral every 4 hours PRN Dyspepsia  melatonin 3 milliGRAM(s) Oral at bedtime PRN Insomnia  ondansetron Injectable 4 milliGRAM(s) IV Push every 8 hours PRN Nausea and/or Vomiting    ALLERGIES:  codeine (Other)  penicillins (Unknown)    LABS                        9.2    6.21  )-----------( 272      ( 20 Dec 2023 05:30 )             28.2     12-20    137  |  105  |  22  ----------------------------<  231<H>  4.2   |  26  |  0.59    Ca    8.5      20 Dec 2023 05:30  Phos  2.7     12-20  Mg     1.9     12-20    TPro  6.6  /  Alb  2.6<L>  /  TBili  0.2  /  DBili  x   /  AST  8<L>  /  ALT  5<L>  /  AlkPhos  66  12-20      Urinalysis Basic - ( 20 Dec 2023 05:30 )    Color: x / Appearance: x / SG: x / pH: x  Gluc: 231 mg/dL / Ketone: x  / Bili: x / Urobili: x   Blood: x / Protein: x / Nitrite: x   Leuk Esterase: x / RBC: x / WBC x   Sq Epi: x / Non Sq Epi: x / Bacteria: x    RADIOLOGY & ADDITIONAL TESTS: Reviewed

## 2023-12-20 NOTE — PROGRESS NOTE ADULT - PROBLEM SELECTOR PLAN 1
Patient on admission meeting SIRS 2/4 (RR and WBC) with O2 needs; lactate wnl. S/p CTX 2g IV x1, azithromycin 500mg IV x1, 1L NS bolus x1 in ED. BP stable at 100s/60s. Initially on NC6L with O2 sats 91% now on NC 2L satting >95%. CXR evidencing presence of opacities concerning for infectious process; UA negative. Likely 2/2 pneumonia.     Plan:  - Abx therapy as below  - F/u Bcx  - Wean O2 as tolerated Likely 2/2 to Pneumonia. Patient on admission meeting SIRS 2/4 (RR and WBC) with O2 needs; lactate wnl. S/p CTX 2g IV x1, azithromycin 500mg IV x1, 1L NS bolus x1 in ED. BP stable at 100s/60s. Initially on NC6L with O2 sats 91% now on NC 2L satting >95%. CXR evidencing presence of opacities concerning for infectious process; UA negative.   legionella/strep - negative  Plan:  - C/w CTX 1g IV q24 and azithromycin 500mg IV q24  - C/w remdesevir+dexamethasone  - F/u BCx  - f/u Speech and Swallow Likely 2/2 to Pneumonia. Patient on admission meeting SIRS 2/4 (RR and WBC) with O2 needs; lactate wnl. S/p CTX 2g IV x1, azithromycin 500mg IV x1, 1L NS bolus x1 in ED. BP stable at 100s/60s. Initially on NC6L with O2 sats 91% now on NC 2L satting >95%. CXR evidencing presence of opacities concerning for infectious process; UA negative.   legionella/strep - negative  Plan:  - C/w CTX 1g IV q24 and azithromycin 500mg IV q24  - C/w remdesevir+dexamethasone  - F/u BCx  - f/u Speech and Swallow  - d/c fluids

## 2023-12-20 NOTE — SWALLOW BEDSIDE ASSESSMENT ADULT - NS SPL SWALLOW CLINIC TRIAL FT
Pt presents with functional juan-pharyngeal swallow on this exam with no overt signs of airway protection deficits or inefficiency. No further follow up warranted from this service. Please reconsult if any dysphagia related concerns arise

## 2023-12-20 NOTE — SWALLOW BEDSIDE ASSESSMENT ADULT - SLP PERTINENT HISTORY OF CURRENT PROBLEM
DM2 with neuropathy, former smoker, wheelchair bound, seizure disorder presents with COVID, hypoxia, fatigue, productive cough

## 2023-12-20 NOTE — PROGRESS NOTE ADULT - PROBLEM SELECTOR PLAN 5
On admission hgb 10.2; baseline 10-11; presenting with pallor with PCP concerns of symptomatic anemia on home iron, B12 and folic acid daily    Plan:  - F/u iron studies  - C/w home meds On admission hgb 10.2; baseline 10-11; presenting with pallor with PCP concerns of symptomatic anemia on home iron, B12 and folic acid daily    Plan:  - Trend Hgb  - C/w home meds

## 2023-12-20 NOTE — DISCHARGE NOTE PROVIDER - HOSPITAL COURSE
#Discharge: do not delete    Patient is 73 year old male with history of DM2 with neuropathy (wheelchair bound), seizure disorder (on Keppra), anemia, orthostatic hypotension, Afib on Eliquis, depression, presenting with 3 weeks of generalized weakness, fatigue, and cough, found to have COVID-19 with possible superimposed bacterial PNA.    Hospital course (by problem):   Severe sepsis secondary to Pneumonia.  Patient on admission meeting SIRS 2/4 (RR and WBC) with O2 needs; lactate wnl. S/p CTX 2g IV x1, azithromycin 500mg IV x1, 1L NS bolus x1 in ED. BP stable at 100s/60s. Initially on NC6L with O2 sats 91% now on room air. CXR evidencing presence of opacities concerning for infectious process; UA negative. Legionella/strep - negative. Received remdesivir and dexamethasone in patient. Continue Cefpodoxime 200mg BID and azithromycin 500mg for 2 more days. Continue Paxlovid.    Pneumonia.   Patient arriving presenting with 3 weeks of generalized weakness, fatigue, and cough poorly responsive to antibiotic therapy. CXR evidencing presence of opacities concerning for infectious process. S/p CTX 2g IV x1, azithromycin 500mg IV x1. Past history of 30-pack year cigarette smoking.  CURB-65 2; RVP + for COVID-19 but cannot r/o superimposed bacterial PNA. Continue antibiotics and Paxlovid.     Diabetes mellitus.   On home Humalog; no medications for neuropathy, wheelchair bound. A1C 6.4. Continue home medications and follow up with outpatient provider.     Seizures.   On home keppra 250mg PO BID. Continue with home medications.     Anemia.   ·  Plan: On admission hgb 10.2; baseline 10-11; presenting with pallor with PCP concerns of symptomatic anemia on home iron, B12 and folic acid daily. Hemoglobin stable with no active signs of bleeding. Follow up with outpatient provider. Continue with home iron, B12 and folic acid.     Orthostatic hypotension.   On midodrine 5mg PO q8. Continue with home medications.     Atrial fibrillation.   NSR on admission; on home eliquis 5mg PO BID; not on rate/rhythm control agents. Continue with home medications.     MDD (major depressive disorder).   On mirtazapine 7.5mg PO QD and risperidone 1mg PO TID. Continue home medications.       Patient was discharged to: home with home health PT    New medications: Cefpodoxime, Azithromycin, Paxlovid  Changes to old medications: None  Medications that were stopped: None    Items to follow up as outpatient: A1C    Physical exam at the time of discharge:  Gen - NAD but cachetic elderly man; AAOx3 on no oxygen  HEENT - NCAT, EOMI  Neck - supple  Resp - CLTA b/l, no increased WOB/tachypnea  CV -  RRR, no m/r/g  Abd - soft, NT, ND; no guarding or rebound  MSK - no gross deformities  Extrem - no LE edema/erythema/tenderness  Neuro - no focal motor or sensation deficits  Skin - warm and well perfused

## 2023-12-20 NOTE — PHYSICAL THERAPY INITIAL EVALUATION ADULT - PERTINENT HX OF CURRENT PROBLEM, REHAB EVAL
73 year old male with history of DM2 with neuropathy (wheelchair bound), seizure disorder (on Keppra), anemia, orthostatic hypotension, Afib on Eliquis, depression, presenting with 3 weeks of generalized weakness, fatigue, and cough. Found to have COVID-19 with possible superimposed bacterial PNA

## 2023-12-20 NOTE — PROGRESS NOTE ADULT - PROBLEM SELECTOR PLAN 2
Patient arriving presenting with 3 weeks of generalized weakness, fatigue, and cough poorly responsive to antibiotic therapy. CXR evidencing presence of opacities concerning for infectious process. S/p CTX 2g IV x1, azithromycin 500mg IV x1. Past history of 30-pack year cigarette smoking. Wife Constance endorses recent episodes of choking while eating/drinking; failed bedside dysphagia. CURB-65 2; RVP + for COVID-19 but cannot r/o superimposed bacterial PNA.     Plan :  - C/w CTX 1g IV q24 and azithromycin 500mg IV q24  - Start remdesevir+dexamethasone  - F/u BCx  - F/u legionella/strep  - F/u procalcitonin  - Maintenance LR+D5 100cc/hr  - Keep NPO; S/S eval in am Patient arriving presenting with 3 weeks of generalized weakness, fatigue, and cough poorly responsive to antibiotic therapy. CXR evidencing presence of opacities concerning for infectious process. S/p CTX 2g IV x1, azithromycin 500mg IV x1. Past history of 30-pack year cigarette smoking. Wife Constance endorses recent episodes of choking while eating/drinking; failed bedside dysphagia. CURB-65 2; RVP + for COVID-19 but cannot r/o superimposed bacterial PNA.     Plan :  - see above

## 2023-12-20 NOTE — DISCHARGE NOTE PROVIDER - NSDCCPCAREPLAN_GEN_ALL_CORE_FT
PRINCIPAL DISCHARGE DIAGNOSIS  Diagnosis: Severe sepsis  Assessment and Plan of Treatment: You were diagnosed with sepsis, a severe infection, likely caused by COVID 19 and Pneumonia. This infection was controlled with antibiotics. You will continue on antibiotics for an additional two days. You were prescribed Cefpodoxime 200mg which you will take twice a day for two more days starting tomorrow 12/22. Additionally you were prescribed an antibiotic, Azithromycin 500mg which you will take once a day for two more days starting 12/22. Finally you were prescribed Paxolvid which will be taken for five days. You will follow up with your primary care physician upon discharge for surveillance that the infection does not return. If you experience any new or worsening symptoms, please return to the emergency department.

## 2023-12-20 NOTE — DISCHARGE NOTE PROVIDER - CARE PROVIDER_API CALL
Danny Garcia  Family Medicine  [address]  [place] [address]  Phone: [phone]  Fax: [phone]  Established Patient  Follow Up Time: 2 weeks   Danny Garcia  Family Medicine  [address]  [place] [address]  Phone: [phone]  Fax: [phone]  Established Patient  Follow Up Time: 2 weeks

## 2023-12-21 ENCOUNTER — TRANSCRIPTION ENCOUNTER (OUTPATIENT)
Age: 73
End: 2023-12-21

## 2023-12-21 VITALS
OXYGEN SATURATION: 95 % | TEMPERATURE: 98 F | DIASTOLIC BLOOD PRESSURE: 75 MMHG | SYSTOLIC BLOOD PRESSURE: 110 MMHG | HEART RATE: 61 BPM | RESPIRATION RATE: 18 BRPM

## 2023-12-21 LAB
ALBUMIN SERPL ELPH-MCNC: 2.7 G/DL — LOW (ref 3.3–5)
ALBUMIN SERPL ELPH-MCNC: 2.7 G/DL — LOW (ref 3.3–5)
ALP SERPL-CCNC: 60 U/L — SIGNIFICANT CHANGE UP (ref 40–120)
ALP SERPL-CCNC: 60 U/L — SIGNIFICANT CHANGE UP (ref 40–120)
ALT FLD-CCNC: <5 U/L — LOW (ref 10–45)
ALT FLD-CCNC: <5 U/L — LOW (ref 10–45)
ANION GAP SERPL CALC-SCNC: 8 MMOL/L — SIGNIFICANT CHANGE UP (ref 5–17)
ANION GAP SERPL CALC-SCNC: 8 MMOL/L — SIGNIFICANT CHANGE UP (ref 5–17)
AST SERPL-CCNC: 8 U/L — LOW (ref 10–40)
AST SERPL-CCNC: 8 U/L — LOW (ref 10–40)
BASOPHILS # BLD AUTO: 0.02 K/UL — SIGNIFICANT CHANGE UP (ref 0–0.2)
BASOPHILS # BLD AUTO: 0.02 K/UL — SIGNIFICANT CHANGE UP (ref 0–0.2)
BASOPHILS NFR BLD AUTO: 0.2 % — SIGNIFICANT CHANGE UP (ref 0–2)
BASOPHILS NFR BLD AUTO: 0.2 % — SIGNIFICANT CHANGE UP (ref 0–2)
BILIRUB SERPL-MCNC: 0.2 MG/DL — SIGNIFICANT CHANGE UP (ref 0.2–1.2)
BILIRUB SERPL-MCNC: 0.2 MG/DL — SIGNIFICANT CHANGE UP (ref 0.2–1.2)
BLD GP AB SCN SERPL QL: NEGATIVE — SIGNIFICANT CHANGE UP
BLD GP AB SCN SERPL QL: NEGATIVE — SIGNIFICANT CHANGE UP
BUN SERPL-MCNC: 29 MG/DL — HIGH (ref 7–23)
BUN SERPL-MCNC: 29 MG/DL — HIGH (ref 7–23)
CALCIUM SERPL-MCNC: 8.6 MG/DL — SIGNIFICANT CHANGE UP (ref 8.4–10.5)
CALCIUM SERPL-MCNC: 8.6 MG/DL — SIGNIFICANT CHANGE UP (ref 8.4–10.5)
CHLORIDE SERPL-SCNC: 108 MMOL/L — SIGNIFICANT CHANGE UP (ref 96–108)
CHLORIDE SERPL-SCNC: 108 MMOL/L — SIGNIFICANT CHANGE UP (ref 96–108)
CO2 SERPL-SCNC: 23 MMOL/L — SIGNIFICANT CHANGE UP (ref 22–31)
CO2 SERPL-SCNC: 23 MMOL/L — SIGNIFICANT CHANGE UP (ref 22–31)
CREAT SERPL-MCNC: 0.68 MG/DL — SIGNIFICANT CHANGE UP (ref 0.5–1.3)
CREAT SERPL-MCNC: 0.68 MG/DL — SIGNIFICANT CHANGE UP (ref 0.5–1.3)
EGFR: 99 ML/MIN/1.73M2 — SIGNIFICANT CHANGE UP
EGFR: 99 ML/MIN/1.73M2 — SIGNIFICANT CHANGE UP
EOSINOPHIL # BLD AUTO: 0.02 K/UL — SIGNIFICANT CHANGE UP (ref 0–0.5)
EOSINOPHIL # BLD AUTO: 0.02 K/UL — SIGNIFICANT CHANGE UP (ref 0–0.5)
EOSINOPHIL NFR BLD AUTO: 0.2 % — SIGNIFICANT CHANGE UP (ref 0–6)
EOSINOPHIL NFR BLD AUTO: 0.2 % — SIGNIFICANT CHANGE UP (ref 0–6)
GLUCOSE BLDC GLUCOMTR-MCNC: 165 MG/DL — HIGH (ref 70–99)
GLUCOSE BLDC GLUCOMTR-MCNC: 165 MG/DL — HIGH (ref 70–99)
GLUCOSE BLDC GLUCOMTR-MCNC: 249 MG/DL — HIGH (ref 70–99)
GLUCOSE BLDC GLUCOMTR-MCNC: 249 MG/DL — HIGH (ref 70–99)
GLUCOSE BLDC GLUCOMTR-MCNC: 292 MG/DL — HIGH (ref 70–99)
GLUCOSE BLDC GLUCOMTR-MCNC: 292 MG/DL — HIGH (ref 70–99)
GLUCOSE SERPL-MCNC: 125 MG/DL — HIGH (ref 70–99)
GLUCOSE SERPL-MCNC: 125 MG/DL — HIGH (ref 70–99)
HCT VFR BLD CALC: 29.3 % — LOW (ref 39–50)
HCT VFR BLD CALC: 29.3 % — LOW (ref 39–50)
HGB BLD-MCNC: 9.4 G/DL — LOW (ref 13–17)
HGB BLD-MCNC: 9.4 G/DL — LOW (ref 13–17)
IMM GRANULOCYTES NFR BLD AUTO: 0.2 % — SIGNIFICANT CHANGE UP (ref 0–0.9)
IMM GRANULOCYTES NFR BLD AUTO: 0.2 % — SIGNIFICANT CHANGE UP (ref 0–0.9)
LYMPHOCYTES # BLD AUTO: 1.67 K/UL — SIGNIFICANT CHANGE UP (ref 1–3.3)
LYMPHOCYTES # BLD AUTO: 1.67 K/UL — SIGNIFICANT CHANGE UP (ref 1–3.3)
LYMPHOCYTES # BLD AUTO: 18 % — SIGNIFICANT CHANGE UP (ref 13–44)
LYMPHOCYTES # BLD AUTO: 18 % — SIGNIFICANT CHANGE UP (ref 13–44)
MAGNESIUM SERPL-MCNC: 1.8 MG/DL — SIGNIFICANT CHANGE UP (ref 1.6–2.6)
MAGNESIUM SERPL-MCNC: 1.8 MG/DL — SIGNIFICANT CHANGE UP (ref 1.6–2.6)
MCHC RBC-ENTMCNC: 30.2 PG — SIGNIFICANT CHANGE UP (ref 27–34)
MCHC RBC-ENTMCNC: 30.2 PG — SIGNIFICANT CHANGE UP (ref 27–34)
MCHC RBC-ENTMCNC: 32.1 GM/DL — SIGNIFICANT CHANGE UP (ref 32–36)
MCHC RBC-ENTMCNC: 32.1 GM/DL — SIGNIFICANT CHANGE UP (ref 32–36)
MCV RBC AUTO: 94.2 FL — SIGNIFICANT CHANGE UP (ref 80–100)
MCV RBC AUTO: 94.2 FL — SIGNIFICANT CHANGE UP (ref 80–100)
MONOCYTES # BLD AUTO: 0.69 K/UL — SIGNIFICANT CHANGE UP (ref 0–0.9)
MONOCYTES # BLD AUTO: 0.69 K/UL — SIGNIFICANT CHANGE UP (ref 0–0.9)
MONOCYTES NFR BLD AUTO: 7.4 % — SIGNIFICANT CHANGE UP (ref 2–14)
MONOCYTES NFR BLD AUTO: 7.4 % — SIGNIFICANT CHANGE UP (ref 2–14)
NEUTROPHILS # BLD AUTO: 6.86 K/UL — SIGNIFICANT CHANGE UP (ref 1.8–7.4)
NEUTROPHILS # BLD AUTO: 6.86 K/UL — SIGNIFICANT CHANGE UP (ref 1.8–7.4)
NEUTROPHILS NFR BLD AUTO: 74 % — SIGNIFICANT CHANGE UP (ref 43–77)
NEUTROPHILS NFR BLD AUTO: 74 % — SIGNIFICANT CHANGE UP (ref 43–77)
NRBC # BLD: 0 /100 WBCS — SIGNIFICANT CHANGE UP (ref 0–0)
NRBC # BLD: 0 /100 WBCS — SIGNIFICANT CHANGE UP (ref 0–0)
PHOSPHATE SERPL-MCNC: 2.2 MG/DL — LOW (ref 2.5–4.5)
PHOSPHATE SERPL-MCNC: 2.2 MG/DL — LOW (ref 2.5–4.5)
PLATELET # BLD AUTO: 273 K/UL — SIGNIFICANT CHANGE UP (ref 150–400)
PLATELET # BLD AUTO: 273 K/UL — SIGNIFICANT CHANGE UP (ref 150–400)
POTASSIUM SERPL-MCNC: 4 MMOL/L — SIGNIFICANT CHANGE UP (ref 3.5–5.3)
POTASSIUM SERPL-MCNC: 4 MMOL/L — SIGNIFICANT CHANGE UP (ref 3.5–5.3)
POTASSIUM SERPL-SCNC: 4 MMOL/L — SIGNIFICANT CHANGE UP (ref 3.5–5.3)
POTASSIUM SERPL-SCNC: 4 MMOL/L — SIGNIFICANT CHANGE UP (ref 3.5–5.3)
PROT SERPL-MCNC: 6.4 G/DL — SIGNIFICANT CHANGE UP (ref 6–8.3)
PROT SERPL-MCNC: 6.4 G/DL — SIGNIFICANT CHANGE UP (ref 6–8.3)
RBC # BLD: 3.11 M/UL — LOW (ref 4.2–5.8)
RBC # BLD: 3.11 M/UL — LOW (ref 4.2–5.8)
RBC # FLD: 13.3 % — SIGNIFICANT CHANGE UP (ref 10.3–14.5)
RBC # FLD: 13.3 % — SIGNIFICANT CHANGE UP (ref 10.3–14.5)
RH IG SCN BLD-IMP: POSITIVE — SIGNIFICANT CHANGE UP
RH IG SCN BLD-IMP: POSITIVE — SIGNIFICANT CHANGE UP
SODIUM SERPL-SCNC: 139 MMOL/L — SIGNIFICANT CHANGE UP (ref 135–145)
SODIUM SERPL-SCNC: 139 MMOL/L — SIGNIFICANT CHANGE UP (ref 135–145)
WBC # BLD: 9.28 K/UL — SIGNIFICANT CHANGE UP (ref 3.8–10.5)
WBC # BLD: 9.28 K/UL — SIGNIFICANT CHANGE UP (ref 3.8–10.5)
WBC # FLD AUTO: 9.28 K/UL — SIGNIFICANT CHANGE UP (ref 3.8–10.5)
WBC # FLD AUTO: 9.28 K/UL — SIGNIFICANT CHANGE UP (ref 3.8–10.5)

## 2023-12-21 PROCEDURE — 81001 URINALYSIS AUTO W/SCOPE: CPT

## 2023-12-21 PROCEDURE — 83735 ASSAY OF MAGNESIUM: CPT

## 2023-12-21 PROCEDURE — 87899 AGENT NOS ASSAY W/OPTIC: CPT

## 2023-12-21 PROCEDURE — 0225U NFCT DS DNA&RNA 21 SARSCOV2: CPT

## 2023-12-21 PROCEDURE — 80053 COMPREHEN METABOLIC PANEL: CPT

## 2023-12-21 PROCEDURE — 99239 HOSP IP/OBS DSCHRG MGMT >30: CPT | Mod: GC

## 2023-12-21 PROCEDURE — 86900 BLOOD TYPING SEROLOGIC ABO: CPT

## 2023-12-21 PROCEDURE — 85025 COMPLETE CBC W/AUTO DIFF WBC: CPT

## 2023-12-21 PROCEDURE — 86850 RBC ANTIBODY SCREEN: CPT

## 2023-12-21 PROCEDURE — 84100 ASSAY OF PHOSPHORUS: CPT

## 2023-12-21 PROCEDURE — 97530 THERAPEUTIC ACTIVITIES: CPT

## 2023-12-21 PROCEDURE — 83036 HEMOGLOBIN GLYCOSYLATED A1C: CPT

## 2023-12-21 PROCEDURE — 86901 BLOOD TYPING SEROLOGIC RH(D): CPT

## 2023-12-21 PROCEDURE — 96365 THER/PROPH/DIAG IV INF INIT: CPT

## 2023-12-21 PROCEDURE — 83605 ASSAY OF LACTIC ACID: CPT

## 2023-12-21 PROCEDURE — 93005 ELECTROCARDIOGRAM TRACING: CPT

## 2023-12-21 PROCEDURE — 97161 PT EVAL LOW COMPLEX 20 MIN: CPT

## 2023-12-21 PROCEDURE — 87040 BLOOD CULTURE FOR BACTERIA: CPT

## 2023-12-21 PROCEDURE — 87449 NOS EACH ORGANISM AG IA: CPT

## 2023-12-21 PROCEDURE — 99285 EMERGENCY DEPT VISIT HI MDM: CPT

## 2023-12-21 PROCEDURE — 36415 COLL VENOUS BLD VENIPUNCTURE: CPT

## 2023-12-21 PROCEDURE — 92610 EVALUATE SWALLOWING FUNCTION: CPT

## 2023-12-21 PROCEDURE — 96368 THER/DIAG CONCURRENT INF: CPT

## 2023-12-21 PROCEDURE — 82962 GLUCOSE BLOOD TEST: CPT

## 2023-12-21 PROCEDURE — 84484 ASSAY OF TROPONIN QUANT: CPT

## 2023-12-21 PROCEDURE — 84443 ASSAY THYROID STIM HORMONE: CPT

## 2023-12-21 PROCEDURE — 71045 X-RAY EXAM CHEST 1 VIEW: CPT

## 2023-12-21 PROCEDURE — 97110 THERAPEUTIC EXERCISES: CPT

## 2023-12-21 RX ORDER — AZITHROMYCIN 500 MG/1
1 TABLET, FILM COATED ORAL
Qty: 2 | Refills: 0
Start: 2023-12-21 | End: 2023-12-22

## 2023-12-21 RX ORDER — POTASSIUM PHOSPHATE, MONOBASIC POTASSIUM PHOSPHATE, DIBASIC 236; 224 MG/ML; MG/ML
30 INJECTION, SOLUTION INTRAVENOUS ONCE
Refills: 0 | Status: COMPLETED | OUTPATIENT
Start: 2023-12-21 | End: 2023-12-21

## 2023-12-21 RX ORDER — CEFPODOXIME PROXETIL 100 MG
1 TABLET ORAL
Qty: 4 | Refills: 0
Start: 2023-12-21 | End: 2023-12-22

## 2023-12-21 RX ORDER — NIRMATRELVIR AND RITONAVIR 150-100 MG
3 KIT ORAL
Qty: 1 | Refills: 0
Start: 2023-12-21 | End: 2023-12-25

## 2023-12-21 RX ADMIN — MIDODRINE HYDROCHLORIDE 5 MILLIGRAM(S): 2.5 TABLET ORAL at 13:20

## 2023-12-21 RX ADMIN — APIXABAN 5 MILLIGRAM(S): 2.5 TABLET, FILM COATED ORAL at 06:31

## 2023-12-21 RX ADMIN — PREGABALIN 1000 MICROGRAM(S): 225 CAPSULE ORAL at 13:20

## 2023-12-21 RX ADMIN — Medication 6: at 13:19

## 2023-12-21 RX ADMIN — Medication 2: at 09:42

## 2023-12-21 RX ADMIN — LEVETIRACETAM 250 MILLIGRAM(S): 250 TABLET, FILM COATED ORAL at 18:13

## 2023-12-21 RX ADMIN — POTASSIUM PHOSPHATE, MONOBASIC POTASSIUM PHOSPHATE, DIBASIC 83.33 MILLIMOLE(S): 236; 224 INJECTION, SOLUTION INTRAVENOUS at 10:37

## 2023-12-21 RX ADMIN — LEVETIRACETAM 250 MILLIGRAM(S): 250 TABLET, FILM COATED ORAL at 06:31

## 2023-12-21 RX ADMIN — Medication 102 MILLIGRAM(S): at 06:31

## 2023-12-21 RX ADMIN — Medication 4: at 18:12

## 2023-12-21 RX ADMIN — RISPERIDONE 1 MILLIGRAM(S): 4 TABLET ORAL at 06:31

## 2023-12-21 RX ADMIN — RISPERIDONE 1 MILLIGRAM(S): 4 TABLET ORAL at 13:20

## 2023-12-21 RX ADMIN — MIDODRINE HYDROCHLORIDE 5 MILLIGRAM(S): 2.5 TABLET ORAL at 18:13

## 2023-12-21 RX ADMIN — MIDODRINE HYDROCHLORIDE 5 MILLIGRAM(S): 2.5 TABLET ORAL at 06:31

## 2023-12-21 RX ADMIN — REMDESIVIR 200 MILLIGRAM(S): 5 INJECTION INTRAVENOUS at 01:14

## 2023-12-21 RX ADMIN — Medication 1 MILLIGRAM(S): at 13:20

## 2023-12-21 RX ADMIN — APIXABAN 5 MILLIGRAM(S): 2.5 TABLET, FILM COATED ORAL at 18:13

## 2023-12-21 NOTE — PROGRESS NOTE ADULT - PROBLEM SELECTOR PLAN 3
On home Humalog; no medications for neuropathy, wheelchair bound     Plan:  - mISS  - PT eval - [facility] vs Home PT

## 2023-12-21 NOTE — PROGRESS NOTE ADULT - PROBLEM SELECTOR PLAN 2
Patient arriving presenting with 3 weeks of generalized weakness, fatigue, and cough poorly responsive to antibiotic therapy. CXR evidencing presence of opacities concerning for infectious process. S/p CTX 2g IV x1, azithromycin 500mg IV x1. Past history of 30-pack year cigarette smoking. Wife Constance endorses recent episodes of choking while eating/drinking; failed bedside dysphagia. CURB-65 2; RVP + for COVID-19 but cannot r/o superimposed bacterial PNA.     Plan :  - see above

## 2023-12-21 NOTE — PROGRESS NOTE ADULT - PROBLEM SELECTOR PLAN 4
On home keppra 250mg PO BID    Plan:  - C/w home meds
On home keppra 250mg PO BID    Plan:  - C/w home meds

## 2023-12-21 NOTE — DISCHARGE NOTE NURSING/CASE MANAGEMENT/SOCIAL WORK - PATIENT PORTAL LINK FT
You can access the FollowMyHealth Patient Portal offered by [facility] by registering at the following website: [email or web address]. By joining [facility]’s FollowMyHealth portal, you will also be able to view your health information using other applications (apps) compatible with our system. You can access the FollowMyHealth Patient Portal offered by [facility] by registering at the following website: [email or web address]. By joining [facility]’s FollowMyHealth portal, you will also be able to view your health information using other applications (apps) compatible with our system.

## 2023-12-21 NOTE — PROGRESS NOTE ADULT - SUBJECTIVE AND OBJECTIVE BOX
Physical Medicine and Rehabilitation Progress Note :       Patient is a 73y old  Male who presents with a chief complaint of Acute hypoxia (21 Dec 2023 08:23)      HPI:  73 year old male with history of 30-pack-year smoking (quit >5 years ago), DM2 with neuropathy (wheelchair bound), seizure disorder (on Keppra), anemia, orthostatic hypotension, Afib on Eliquis, depression, presenting with generalized weakness, fatigue, and non-bloody productive cough. Per family at bedside3 weeks ago patient presented with symptoms w/ associated hypoxia for which PCP started him on azithromycin for 6 dyas. At that time the patient did not markedly improve for which therapy was transitioned to a 10 day course of levofloxacin and methylprednisolone which he finished the day before admission Today patient went to PCP office for eval who sent him here after noting anemia on outpatient labs and pallor on physical exam. Does not report bloody stools, melena, gross hematuria, hematemesis, past history of cancer.     ED course:  Vitals: T: 98->97.9; HR: 113->82->74; BP: 100s/60s (stable); RR:20->16; O2sat: 91 on NC6L -> 99 on NC 2L  Labs: CBC-> WBC-18.34 (Neut 87.2%), Hgb-10.2, MCV-92.5; Lactate->1.9  EKG: NSR; QTc-452; possible previous inferior infarcts  Imaging: Streaky opacities right midlung and left base, suspicious for infection.  Interventions: CTX 2g IV x1, azithromycin 500mg IV x1, 1L NS bolus x1   (19 Dec 2023 16:22)                            9.4    9.28  )-----------( 273      ( 21 Dec 2023 05:30 )             29.3       12-21    139  |  108  |  29<H>  ----------------------------<  125<H>  4.0   |  23  |  0.68    Ca    8.6      21 Dec 2023 05:30  Phos  2.2     12-21  Mg     1.8     12-21    TPro  6.4  /  Alb  2.7<L>  /  TBili  0.2  /  DBili  x   /  AST  8<L>  /  ALT  <5<L>  /  AlkPhos  60  12-21    Vital Signs Last 24 Hrs  T(C): 36.2 (21 Dec 2023 06:03), Max: 37.1 (20 Dec 2023 17:01)  T(F): 97.1 (21 Dec 2023 06:03), Max: 98.7 (20 Dec 2023 17:01)  HR: 59 (21 Dec 2023 06:03) (59 - 65)  BP: 109/73 (21 Dec 2023 06:03) (96/59 - 112/68)  BP(mean): --  RR: 18 (21 Dec 2023 06:03) (17 - 18)  SpO2: 95% (21 Dec 2023 06:03) (94% - 98%)    Parameters below as of 20 Dec 2023 21:14  Patient On (Oxygen Delivery Method): nasal cannula  O2 Flow (L/min): 2      MEDICATIONS  (STANDING):  apixaban 5 milliGRAM(s) Oral every 12 hours  atorvastatin 10 milliGRAM(s) Oral at bedtime  azithromycin  IVPB 500 milliGRAM(s) IV Intermittent every 24 hours  cefTRIAXone   IVPB 1000 milliGRAM(s) IV Intermittent every 24 hours  cyanocobalamin 1000 MICROGram(s) Oral daily  dexAMETHasone  IVPB 10 milliGRAM(s) IV Intermittent daily  folic acid 1 milliGRAM(s) Oral daily  insulin lispro (ADMELOG) corrective regimen sliding scale   SubCutaneous three times a day before meals  levETIRAcetam 250 milliGRAM(s) Oral two times a day  midodrine. 5 milliGRAM(s) Oral three times a day  mirtazapine 7.5 milliGRAM(s) Oral at bedtime  remdesivir  IVPB 100 milliGRAM(s) IV Intermittent once  risperiDONE   Tablet 1 milliGRAM(s) Oral three times a day    MEDICATIONS  (PRN):  acetaminophen     Tablet .. 650 milliGRAM(s) Oral every 6 hours PRN Temp greater or equal to 38C (100.4F), Mild Pain (1 - 3)  aluminum hydroxide/magnesium hydroxide/simethicone Suspension 30 milliLiter(s) Oral every 4 hours PRN Dyspepsia  melatonin 3 milliGRAM(s) Oral at bedtime PRN Insomnia  ondansetron Injectable 4 milliGRAM(s) IV Push every 8 hours PRN Nausea and/or Vomiting          Initial Functional Status Assessment :       Previous Level of Function:     · Ambulation Skills	needed assist  · Transfer Skills	needed assist  · ADL Skills	needed assist  · Additional Comments	Pt lives in elevator apartment, with his wife who assists with all ADLs as pt is homebound using w/c for all mobility. Pt reports he is able to stand/squat pivot in/out of his w/c independently.    Cognitive Status Examination:   · Orientation	oriented to person, place, time and situation  · Level of Consciousness	alert  · Follows Commands and Answers Questions	100% of the time  · Personal Safety and Judgment	intact  · Short Term Memory	intact    Range of Motion Exam:   · Range of Motion Examination	bilateral upper extremity ROM was WFL (within functional limits); bilateral lower extremity ROM was WFL (within functional limits)    Manual Muscle Testing:   · Manual Muscle Testing Results	grossly assessed due to  symptomatic orthostatic hypotension, BUE >/=3/5, BLE grossly 3-/5 to 4-/5    Bed Mobility: Rolling/Turning:     · Level of [place]	stand-by assist    Bed Mobility: Scooting/Bridging:     · Level of [place]	minimum assist (75% patients effort)  · Physical Assist/Nonphysical Assist	1 person assist; verbal cues  · Assistive Device	bed rails    Bed Mobility: Sit to Supine:     · Level of [place]	moderate assist (50% patients effort)  · Physical Assist/Nonphysical Assist	1 person assist; verbal cues  · Assistive Device	bed rails    Bed Mobility: Supine to Sit:     · Level of [place]	minimum assist (75% patients effort)  · Physical Assist/Nonphysical Assist	1 person assist; verbal cues  · Assistive Device	bed rails    Bed Mobility Analysis:     · Bed Mobility Limitations	decreased ability to use arms for pushing/pulling; decreased ability to use legs for bridging/pushing  · Impairments Contributing to Impaired Bed Mobility	impaired balance; decreased flexibility; decreased strength    Transfer: Sit to Stand:     · Level of [place]	not tested 2/2 [place]    Balance Skills Assessment:     · Sitting Balance: Static	good minus  · Sitting Balance: Dynamic	good minus    Sensory Examination:   Sensory Examination:    Grossly Intact:   · Gross Sensory Examination	Grossly Intact      Clinical Impressions:   · Criteria for Skilled Therapeutic Interventions	impairments found; functional limitations in following categories; risk reduction/prevention  · Impairments Found (describe specific impairments)	aerobic capacity/endurance; gait, locomotion, and balance; muscle strength  · Functional Limitations in Following Categories (describe specific limitations)	self-care; home management        PM&R Impression : as above    Current disposition plan recommendation :    subacute rehab placement            Physical Medicine and Rehabilitation Progress Note :       Patient is a 73y old  Male who presents with a chief complaint of Acute hypoxia (21 Dec 2023 08:23)      HPI:  73 year old male with history of 30-pack-year smoking (quit >5 years ago), DM2 with neuropathy (wheelchair bound), seizure disorder (on Keppra), anemia, orthostatic hypotension, Afib on Eliquis, depression, presenting with generalized weakness, fatigue, and non-bloody productive cough. Per family at bedside3 weeks ago patient presented with symptoms w/ associated hypoxia for which PCP started him on azithromycin for 6 dyas. At that time the patient did not markedly improve for which therapy was transitioned to a 10 day course of levofloxacin and methylprednisolone which he finished the day before admission Today patient went to PCP office for eval who sent him here after noting anemia on outpatient labs and pallor on physical exam. Does not report bloody stools, melena, gross hematuria, hematemesis, past history of cancer.     ED course:  Vitals: T: 98->97.9; HR: 113->82->74; BP: 100s/60s (stable); RR:20->16; O2sat: 91 on NC6L -> 99 on NC 2L  Labs: CBC-> WBC-18.34 (Neut 87.2%), Hgb-10.2, MCV-92.5; Lactate->1.9  EKG: NSR; QTc-452; possible previous inferior infarcts  Imaging: Streaky opacities right midlung and left base, suspicious for infection.  Interventions: CTX 2g IV x1, azithromycin 500mg IV x1, 1L NS bolus x1   (19 Dec 2023 16:22)                            9.4    9.28  )-----------( 273      ( 21 Dec 2023 05:30 )             29.3       12-21    139  |  108  |  29<H>  ----------------------------<  125<H>  4.0   |  23  |  0.68    Ca    8.6      21 Dec 2023 05:30  Phos  2.2     12-21  Mg     1.8     12-21    TPro  6.4  /  Alb  2.7<L>  /  TBili  0.2  /  DBili  x   /  AST  8<L>  /  ALT  <5<L>  /  AlkPhos  60  12-21    Vital Signs Last 24 Hrs  T(C): 36.2 (21 Dec 2023 06:03), Max: 37.1 (20 Dec 2023 17:01)  T(F): 97.1 (21 Dec 2023 06:03), Max: 98.7 (20 Dec 2023 17:01)  HR: 59 (21 Dec 2023 06:03) (59 - 65)  BP: 109/73 (21 Dec 2023 06:03) (96/59 - 112/68)  BP(mean): --  RR: 18 (21 Dec 2023 06:03) (17 - 18)  SpO2: 95% (21 Dec 2023 06:03) (94% - 98%)    Parameters below as of 20 Dec 2023 21:14  Patient On (Oxygen Delivery Method): nasal cannula  O2 Flow (L/min): 2      MEDICATIONS  (STANDING):  apixaban 5 milliGRAM(s) Oral every 12 hours  atorvastatin 10 milliGRAM(s) Oral at bedtime  azithromycin  IVPB 500 milliGRAM(s) IV Intermittent every 24 hours  cefTRIAXone   IVPB 1000 milliGRAM(s) IV Intermittent every 24 hours  cyanocobalamin 1000 MICROGram(s) Oral daily  dexAMETHasone  IVPB 10 milliGRAM(s) IV Intermittent daily  folic acid 1 milliGRAM(s) Oral daily  insulin lispro (ADMELOG) corrective regimen sliding scale   SubCutaneous three times a day before meals  levETIRAcetam 250 milliGRAM(s) Oral two times a day  midodrine. 5 milliGRAM(s) Oral three times a day  mirtazapine 7.5 milliGRAM(s) Oral at bedtime  remdesivir  IVPB 100 milliGRAM(s) IV Intermittent once  risperiDONE   Tablet 1 milliGRAM(s) Oral three times a day    MEDICATIONS  (PRN):  acetaminophen     Tablet .. 650 milliGRAM(s) Oral every 6 hours PRN Temp greater or equal to 38C (100.4F), Mild Pain (1 - 3)  aluminum hydroxide/magnesium hydroxide/simethicone Suspension 30 milliLiter(s) Oral every 4 hours PRN Dyspepsia  melatonin 3 milliGRAM(s) Oral at bedtime PRN Insomnia  ondansetron Injectable 4 milliGRAM(s) IV Push every 8 hours PRN Nausea and/or Vomiting          Initial Functional Status Assessment :       Previous Level of Function:     · Ambulation Skills	needed assist  · Transfer Skills	needed assist  · ADL Skills	needed assist  · Additional Comments	Pt lives in elevator apartment, with his wife who assists with all ADLs as pt is homebound using w/c for all mobility. Pt reports he is able to stand/squat pivot in/out of his w/c independently.    Cognitive Status Examination:   · Orientation	oriented to person, place, time and situation  · Level of Consciousness	alert  · Follows Commands and Answers Questions	100% of the time  · Personal Safety and Judgment	intact  · Short Term Memory	intact    Range of Motion Exam:   · Range of Motion Examination	bilateral upper extremity ROM was WFL (within functional limits); bilateral lower extremity ROM was WFL (within functional limits)    Manual Muscle Testing:   · Manual Muscle Testing Results	grossly assessed due to  symptomatic orthostatic hypotension, BUE >/=3/5, BLE grossly 3-/5 to 4-/5    Bed Mobility: Rolling/Turning:     · Level of [place]	stand-by assist    Bed Mobility: Scooting/Bridging:     · Level of [place]	minimum assist (75% patients effort)  · Physical Assist/Nonphysical Assist	1 person assist; verbal cues  · Assistive Device	bed rails    Bed Mobility: Sit to Supine:     · Level of [place]	moderate assist (50% patients effort)  · Physical Assist/Nonphysical Assist	1 person assist; verbal cues  · Assistive Device	bed rails    Bed Mobility: Supine to Sit:     · Level of [place]	minimum assist (75% patients effort)  · Physical Assist/Nonphysical Assist	1 person assist; verbal cues  · Assistive Device	bed rails    Bed Mobility Analysis:     · Bed Mobility Limitations	decreased ability to use arms for pushing/pulling; decreased ability to use legs for bridging/pushing  · Impairments Contributing to Impaired Bed Mobility	impaired balance; decreased flexibility; decreased strength    Transfer: Sit to Stand:     · Level of [place]	not tested 2/2 [place]    Balance Skills Assessment:     · Sitting Balance: Static	good minus  · Sitting Balance: Dynamic	good minus    Sensory Examination:   Sensory Examination:    Grossly Intact:   · Gross Sensory Examination	Grossly Intact      Clinical Impressions:   · Criteria for Skilled Therapeutic Interventions	impairments found; functional limitations in following categories; risk reduction/prevention  · Impairments Found (describe specific impairments)	aerobic capacity/endurance; gait, locomotion, and balance; muscle strength  · Functional Limitations in Following Categories (describe specific limitations)	self-care; home management        PM&R Impression : as above    Current disposition plan recommendation :    subacute rehab placement

## 2023-12-21 NOTE — PROGRESS NOTE ADULT - SUBJECTIVE AND OBJECTIVE BOX
INTERVAL HPI/OVERNIGHT EVENTS:  As per night team, no overnight events. Patient seen and examined at bedside. States he feels well. No acute complaints. Saturating well on room air. Patient denies fever, chills, dizziness, weakness, HA, CP, SOB, N/V/D/C    VITALS  Vital Signs Last 24 Hrs  T(C): 36.2 (21 Dec 2023 06:03), Max: 37.1 (20 Dec 2023 17:01)  T(F): 97.1 (21 Dec 2023 06:03), Max: 98.7 (20 Dec 2023 17:01)  HR: 59 (21 Dec 2023 06:03) (59 - 77)  BP: 109/73 (21 Dec 2023 06:03) (96/59 - 112/68)  BP(mean): --  RR: 18 (21 Dec 2023 06:03) (17 - 18)  SpO2: 95% (21 Dec 2023 06:03) (94% - 98%)    Parameters below as of 20 Dec 2023 21:14  Patient On (Oxygen Delivery Method): Room Air    CAPILLARY BLOOD GLUCOSE  POCT Blood Glucose.: 165 mg/dL (20 Dec 2023 22:10)  POCT Blood Glucose.: 179 mg/dL (20 Dec 2023 19:06)  POCT Blood Glucose.: 333 mg/dL (20 Dec 2023 13:11)  POCT Blood Glucose.: 256 mg/dL (20 Dec 2023 09:27)    PHYSICAL EXAM  General: NAD, sitting comfortably in bed, Not on oxygen  HEENT: PERRL/ EOMI, no scleral icterus, MMM  Neck: Supple, no JVD  Respiratory: lungs CTA b/l, no wheezes/crackles, no accessory muscle use  Cardiovascular: Regular rhythm/rate; +S1 +S2, no murmurs  Gastrointestinal: Soft, NTND, normoactive BS, no rebound, no guarding  Genitourinary: no suprapubic tenderness  Extremities: WWP, no cyanosis, no clubbing, no edema, pulses equal  Neurological: A&Ox3, no gross focal deficits, follows commands  Skin: Normal temperature, warm, dry    MEDICATIONS  (STANDING):  apixaban 5 milliGRAM(s) Oral every 12 hours  atorvastatin 10 milliGRAM(s) Oral at bedtime  azithromycin  IVPB 500 milliGRAM(s) IV Intermittent every 24 hours  cefTRIAXone   IVPB 1000 milliGRAM(s) IV Intermittent every 24 hours  cyanocobalamin 1000 MICROGram(s) Oral daily  dexAMETHasone  IVPB 10 milliGRAM(s) IV Intermittent daily  folic acid 1 milliGRAM(s) Oral daily  insulin lispro (ADMELOG) corrective regimen sliding scale   SubCutaneous three times a day before meals  levETIRAcetam 250 milliGRAM(s) Oral two times a day  midodrine. 5 milliGRAM(s) Oral three times a day  mirtazapine 7.5 milliGRAM(s) Oral at bedtime  remdesivir  IVPB 100 milliGRAM(s) IV Intermittent once  risperiDONE   Tablet 1 milliGRAM(s) Oral three times a day    MEDICATIONS  (PRN):  acetaminophen     Tablet .. 650 milliGRAM(s) Oral every 6 hours PRN Temp greater or equal to 38C (100.4F), Mild Pain (1 - 3)  aluminum hydroxide/magnesium hydroxide/simethicone Suspension 30 milliLiter(s) Oral every 4 hours PRN Dyspepsia  melatonin 3 milliGRAM(s) Oral at bedtime PRN Insomnia  ondansetron Injectable 4 milliGRAM(s) IV Push every 8 hours PRN Nausea and/or Vomiting    ALLERGIES:  codeine (Other)  penicillins (Unknown)      LABS                        9.4    9.28  )-----------( 273      ( 21 Dec 2023 05:30 )             29.3     12-21    139  |  108  |  x   ----------------------------<  x   4.0   |  x   |  x     Ca    8.5      20 Dec 2023 05:30  Phos  2.7     12-20  Mg     1.8     12-21    TPro  6.6  /  Alb  2.6<L>  /  TBili  0.2  /  DBili  x   /  AST  8<L>  /  ALT  5<L>  /  AlkPhos  66  12-20      Urinalysis Basic - ( 20 Dec 2023 05:30 )    Color: x / Appearance: x / SG: x / pH: x  Gluc: 231 mg/dL / Ketone: x  / Bili: x / Urobili: x   Blood: x / Protein: x / Nitrite: x   Leuk Esterase: x / RBC: x / WBC x   Sq Epi: x / Non Sq Epi: x / Bacteria: x      RADIOLOGY & ADDITIONAL TESTS: Reviewed

## 2023-12-21 NOTE — DISCHARGE NOTE NURSING/CASE MANAGEMENT/SOCIAL WORK - NSDCPEFALRISK_GEN_ALL_CORE
For information on Fall & Injury Prevention, visit: [email or web address] OR  [email or web address] OR  https://www.cdc.gov/steadi/patient.html For information on Fall & Injury Prevention, visit: [email or web address] OR  [email or web address] OR  https://www.cdc.gov/steadi/patient.html

## 2023-12-21 NOTE — PROGRESS NOTE ADULT - PROBLEM SELECTOR PLAN 1
Likely 2/2 to Pneumonia. Patient on admission meeting SIRS 2/4 (RR and WBC) with O2 needs; lactate wnl. S/p CTX 2g IV x1, azithromycin 500mg IV x1, 1L NS bolus x1 in ED. BP stable at 100s/60s. Initially on NC6L with O2 sats 91% now on NC 2L satting >95%. CXR evidencing presence of opacities concerning for infectious process; UA negative.   legionella/strep - negative  Plan:  - C/w CTX 1g IV q24 and azithromycin 500mg IV q24  - C/w remdesevir+dexamethasone  - BCx - NG@1 day  - Speech and Swallow - continue regular diet with thin liquids

## 2023-12-21 NOTE — DISCHARGE NOTE NURSING/CASE MANAGEMENT/SOCIAL WORK - CAREGIVER ADDRESS
[address] [place] [address] [address], [place] [address] [address] [place] [address] [address], [place] [address]

## 2023-12-21 NOTE — PROGRESS NOTE ADULT - PROBLEM SELECTOR PLAN 9
N: Regular with thin liquids  E: replete as necessary  PPI: N/A  DVT: eliquis   Code: Full code  D: F

## 2023-12-21 NOTE — PROGRESS NOTE ADULT - ATTENDING COMMENTS
Patient clinically improved, off NC, maintaining saturation on RA.  Labs reviewed    Patient with resolution of Leucocytosis, will continue on CAP coverage on discharge with Cefpodoxime and Azithromycin. Bcx with no growth to date, urine Legionella, urine strep negative.  Replete phos, will continue Paxlovid on DC.  Continue AC, patient tolerating  Patient refusing MARYCARMEN, requesting DC home. SW following.
Patient clinically improving, off NC. Passed dysphagia screen, no other complaints or events reported.  Labs, reviewed    Continue on Dexamethasone/Remdesivir, monitor FS, will adjust insulin to FS  Continue on Ceftriaxone/Azithromycin, leucocytosis resolved. Follow-up Bcx, legionella, strep Pneumonia  Continue AC

## 2023-12-21 NOTE — PROGRESS NOTE ADULT - PROBLEM SELECTOR PLAN 5
On admission hgb 10.2; baseline 10-11; presenting with pallor with PCP concerns of symptomatic anemia on home iron, B12 and folic acid daily    Plan:  - Trend Hgb  - C/w home meds

## 2023-12-21 NOTE — PROGRESS NOTE ADULT - ASSESSMENT
I M    73 year old male with history of DM2 with neuropathy (wheelchair bound), seizure disorder (on Keppra), anemia, orthostatic hypotension, Afib on Eliquis, depression, presenting with 3 weeks of generalized weakness, fatigue, and cough. Found to have COVID-19 with possible superimposed bacterial PNA      Problem/Plan - 1:  ·  Problem: Severe sepsis.   ·  Plan: Likely 2/2 to Pneumonia. Patient on admission meeting SIRS 2/4 (RR and WBC) with O2 needs; lactate wnl. S/p CTX 2g IV x1, azithromycin 500mg IV x1, 1L NS bolus x1 in ED. BP stable at 100s/60s. Initially on NC6L with O2 sats 91% now on NC 2L satting >95%. CXR evidencing presence of opacities concerning for infectious process; UA negative.   legionella/strep - negative  Plan:  - C/w CTX 1g IV q24 and azithromycin 500mg IV q24  - C/w remdesevir+dexamethasone  - F/u BCx  - f/u Speech and Swallow  - d/c fluids.    Problem/Plan - 2:  ·  Problem: Pneumonia.   ·  Plan: Patient arriving presenting with 3 weeks of generalized weakness, fatigue, and cough poorly responsive to antibiotic therapy. CXR evidencing presence of opacities concerning for infectious process. S/p CTX 2g IV x1, azithromycin 500mg IV x1. Past history of 30-pack year cigarette smoking. Wife Constance endorses recent episodes of choking while eating/drinking; failed bedside dysphagia. CURB-65 2; RVP + for COVID-19 but cannot r/o superimposed bacterial PNA.     Plan :  - see above.    Problem/Plan - 3:  ·  Problem: Diabetes mellitus.   ·  Plan: On home Humalog; no medications for neuropathy, wheelchair bound     Plan:  - mISS  - PT eval.    Problem/Plan - 4:  ·  Problem: Seizures.   ·  Plan: On home keppra 250mg PO BID    Plan:  - C/w home meds.    Problem/Plan - 5:  ·  Problem: Anemia.   ·  Plan: On admission hgb 10.2; baseline 10-11; presenting with pallor with PCP concerns of symptomatic anemia on home iron, B12 and folic acid daily    Plan:  - Trend Hgb  - C/w home meds.    Problem/Plan - 6:  ·  Problem: Orthostatic hypotension.   ·  Plan: On midodrine 5mg PO q8    Plan:  - C/w home meds.    Problem/Plan - 7:  ·  Problem: Atrial fibrillation.   ·  Plan: NSR on admission; on home eliquis 5mg PO BID; not on rate/rhythm control agents    Plan:  C/w home meds.    Problem/Plan - 8:  ·  Problem: MDD (major depressive disorder).   ·  Plan: On mirtazapine 7.5mg PO QD and risperidone 1mg PO TID    Plan: C/w home meds.    Problem/Plan - 9:  ·  Problem: Nutrition, metabolism, and development symptoms.   ·  Plan: N: Soft and Bite sized  E: replete as necessary  PPI: N/A  DVT: eliquis   Code: Full code  D: RMF.  
73 year old male with history of DM2 with neuropathy (wheelchair bound), seizure disorder (on Keppra), anemia, orthostatic hypotension, Afib on Eliquis, depression, presenting with 3 weeks of generalized weakness, fatigue, and cough. Found to have COVID-19 with possible superimposed bacterial PNA
73 year old male with history of DM2 with neuropathy (wheelchair bound), seizure disorder (on Keppra), anemia, orthostatic hypotension, Afib on Eliquis, depression, presenting with 3 weeks of generalized weakness, fatigue, and cough. Found to have COVID-19 with possible superimposed bacterial PNA

## 2023-12-21 NOTE — PROGRESS NOTE ADULT - PROBLEM SELECTOR PLAN 8
On mirtazapine 7.5mg PO QD and risperidone 1mg PO TID    Plan: C/w home meds
On mirtazapine 7.5mg PO QD and risperidone 1mg PO TID    Plan: C/w home meds

## 2023-12-21 NOTE — PROGRESS NOTE ADULT - PROBLEM SELECTOR PLAN 7
NSR on admission; on home eliquis 5mg PO BID; not on rate/rhythm control agents    Plan:  C/w home meds
NSR on admission; on home eliquis 5mg PO BID; not on rate/rhythm control agents    Plan:  C/w home meds
3 = A little assistance

## 2023-12-24 LAB
CULTURE RESULTS: SIGNIFICANT CHANGE UP
SPECIMEN SOURCE: SIGNIFICANT CHANGE UP

## 2023-12-28 DIAGNOSIS — I95.1 ORTHOSTATIC HYPOTENSION: ICD-10-CM

## 2023-12-28 DIAGNOSIS — E11.40 TYPE 2 DIABETES MELLITUS WITH DIABETIC NEUROPATHY, UNSPECIFIED: ICD-10-CM

## 2023-12-28 DIAGNOSIS — I48.91 UNSPECIFIED ATRIAL FIBRILLATION: ICD-10-CM

## 2023-12-28 DIAGNOSIS — D64.9 ANEMIA, UNSPECIFIED: ICD-10-CM

## 2023-12-28 DIAGNOSIS — Z88.0 ALLERGY STATUS TO PENICILLIN: ICD-10-CM

## 2023-12-28 DIAGNOSIS — J15.9 UNSPECIFIED BACTERIAL PNEUMONIA: ICD-10-CM

## 2023-12-28 DIAGNOSIS — Z79.01 LONG TERM (CURRENT) USE OF ANTICOAGULANTS: ICD-10-CM

## 2023-12-28 DIAGNOSIS — F32.9 MAJOR DEPRESSIVE DISORDER, SINGLE EPISODE, UNSPECIFIED: ICD-10-CM

## 2023-12-28 DIAGNOSIS — U07.1 COVID-19: ICD-10-CM

## 2023-12-28 DIAGNOSIS — Z88.6 ALLERGY STATUS TO ANALGESIC AGENT: ICD-10-CM

## 2023-12-28 DIAGNOSIS — A41.9 SEPSIS, UNSPECIFIED ORGANISM: ICD-10-CM

## 2023-12-28 DIAGNOSIS — E78.5 HYPERLIPIDEMIA, UNSPECIFIED: ICD-10-CM

## 2023-12-28 DIAGNOSIS — G40.909 EPILEPSY, UNSPECIFIED, NOT INTRACTABLE, WITHOUT STATUS EPILEPTICUS: ICD-10-CM

## 2023-12-28 DIAGNOSIS — Z87.891 PERSONAL HISTORY OF NICOTINE DEPENDENCE: ICD-10-CM

## 2023-12-28 DIAGNOSIS — Z99.3 DEPENDENCE ON WHEELCHAIR: ICD-10-CM

## 2023-12-28 DIAGNOSIS — R65.20 SEVERE SEPSIS WITHOUT SEPTIC SHOCK: ICD-10-CM

## 2023-12-28 DIAGNOSIS — R09.02 HYPOXEMIA: ICD-10-CM

## 2023-12-28 DIAGNOSIS — R53.1 WEAKNESS: ICD-10-CM

## 2024-02-25 NOTE — PATIENT PROFILE ADULT. - FUNCTIONAL SCREEN CURRENT LEVEL: SWALLOWING (IF SCORE 2 OR MORE FOR ANY ITEM, CONSULT REHAB SERVICES), MLM)
[facility]     Christina Scanlon Patient Status:  Emergency    1980 MRN [id number]   Location [facility] EMERGENCY DEPARTMENT Attending Charity Holbrook DO   Hosp Day # 0 PCP PHYSICIAN NONSTAFF     Chief Complaint: Allergic reaction    Subjective:    History of Present Illness:     Christina Scanlon is a 43 year old female with no significant PMH presented to ED following anaphylactic reaction to eggs. Patient with known allergy to eggs. Patient was using boxed egg whites to make bread with children when suddenly she began to wheeze. Throat then began to tighten and patient felt like tongue was swelling. Patient denies ingesting or touching eggs. Patient did eat out earlier in the day around 4PM. Patient reports last time she had this type of reaction was in high school. After ED treatment, patient is feeling back to normal without acute complaints. Denies CP/SOB/N/V/D.     History/Other:    Past Medical History:  No past medical history on file.  Past Surgical History:   No past surgical history on file.   Family History:   Family History   Problem Relation Age of Onset    Diabetes Mother     Hyperlipidemia Mother     Obesity Mother     Hypertension Mother     Cancer Father         Lymphoma    Arrhythmia Father         Afib     Social History:         Allergies:   Allergies   Allergen Reactions    Egg ANAPHYLAXIS     [facility]ner Allergy Text Annotation: Eggs         Medications:    No current facility-administered medications on file prior to encounter.     Current Outpatient Medications on File Prior to Encounter   Medication Sig Dispense Refill    Phentermine HCl 15 MG Oral Cap Take 1 capsule (15 mg total) by mouth every morning. 30 capsule 1    ergocalciferol 1.25 MG (73676 UT) Oral Cap Take 1 capsule (50,000 Units total) by mouth once a week. With food for 6 months total 24 capsule 0       Review of Systems:   A comprehensive review of systems was completed.    Pertinent  positives and negatives noted in the HPI.    Objective:   Physical Exam:    BP 98/70   Pulse 97   Resp 15   SpO2 98%   General: No acute distress, Alert  Respiratory: No rhonchi, no wheezes  Cardiovascular: S1, S2. Regular rate and rhythm  Abdomen: Soft, Non-tender, non-distended, positive bowel sounds  Neuro: No new focal deficits  Extremities: No edema    Results:    Labs:      Labs Last 24 Hours:    Recent Labs   Lab 02/24/24  1916   RBC 5.17   HGB 14.0   HCT 43.1   MCV 83.4   MCH 27.1   MCHC 32.5   RDW 13.1   NEPRELIM 6.83   WBC 13.0*   .0       No results for input(s): \"GLU\", \"BUN\", \"CREATSERUM\", \"GFRAA\", \"GFRNAA\", \"EGFRCR\", \"CA\", \"ALB\", \"NA\", \"K\", \"CL\", \"CO2\", \"ALKPHO\", \"AST\", \"ALT\", \"BILT\", \"TP\" in the last 168 hours.    No results found for: \"PT\", \"INR\"    No results for input(s): \"TROP\", \"TROPHS\", \"CK\" in the last 168 hours.    No results for input(s): \"TROP\", \"PBNP\" in the last 168 hours.    No results for input(s): \"PCT\" in the last 168 hours.    Imaging: Imaging data reviewed in Epic.    Assessment & Plan:      #Anaphylactic reaction to eggs  -Given subcutaneous EPI x 3, racemic epi, steroids, benadryl, and pepcid with improvement in ED with clinical resolution at this time   -Scheduled IV steroids, benadryl, pepcid for now  -Tele  -Continuous pulse-ox    Plan of care discussed with patient and ED physician.    Antonio Gomes DO    Supplementary Documentation:     The 21st Century Cures Act makes medical notes like these available to patients in the interest of transparency. Please be advised this is a medical document. Medical documents are intended to carry relevant information, facts as evident, and the clinical opinion of the practitioner. The medical note is intended as peer to peer communication and may appear blunt or direct. It is written in medical language and may contain abbreviations or verbiage that are unfamiliar.                                  (0) swallows foods/liquids without difficulty

## 2024-03-02 NOTE — PATIENT PROFILE ADULT - BRADEN FRICTION AND SHEAR
89F with hx of HTN, pAfib (on Eliquis), HFpEF (EF 55-60%, 6/2023), severe MR s/p Saulo transcathter MV repair ([facility], 7/03/19), OA, and recent hospitalization 2/21-2/28/24 for mechanical fall with course c/b non-displaced Lt great trochanter proximal femur fx s/p ORIF and was discharged to [facility], now presenting with SOB.    Pt seen in room for nutrition assessment. Pt was asleep this morning when RD visited, per nursing, pt confused, disoriented, overnight pt noted to be combative. RD to obtain additional subjective information upon follow up prn. Pt appears to have lower appetite currently, pt "swatted away" breakfast tray this morning. Pt drank some juice, no food this morning. Diet recall could not be obtained. Currently on minced and moist diet, per SLP recommendations, this texture is recommended. Pt tolerating fairly to poorly, noted with <50% PO intakes overall. No cultural, Anabaptist, or ethnic food preferences could be obtained, RD to follow up. No known food allergies documented. Per electronic medical records [facility] data, pt noted with no significant wt changes, overall wt stability noted at current wt. Dosing wt: 130 pounds, Ideal body weight: 130 pounds, per these weights, pt is 100% of ideal body weight. RD recommends that updated weights are obtained for this pt to ensure accuracy. No noted nausea, vomiting, diarrhea, constipation, last BM on 3/1/24. No edema. Skin: bruising (ecchymosis), left hip surgical incision, sacral stage III pressure ulcer. [place]wick: 14. Some issues chewing or swallowing noted per speech pathology, diet textures adjusted. Nonverbal indicators of pain absent. Labs reviewed: elevated BUN (25), serum Glucose (103), ALP (135), AST (72), low eGFR (45); RD to continue to monitor trends. Nutritionally pertinent medications/supplements: IV antibiotics, ergocalciferol, furosemide, thiamine, torsemide, senna, MIRALAX. RD observed pt with moderate orbital fat loss, RD unable to complete full nutrition focused physical exam, RD to follow up. Based on ASPEN guidelines, pt does not meet criteria for malnutrition at this time. Education deferred at this time, RD to follow up. No additional nutrition-related concerns. Additional nutrition recommendations below to follow.
(2) potential problem

## 2024-03-03 NOTE — PATIENT PROFILE ADULT - HCP AGENT'S NAME
Constance Zakiya
CT abdomen and pelvis does not show any acute abnormalities.  Urinalysis today shows 5 white blood cells with few bacteria.  Labs reviewed and white blood cell count slightly increased to 4.29 today.  H&H is stable and electrolytes are stable.  Lactate is noted to be 1.4 as well.  Patient's temperature improves down to 100.3 with improvement of her heart rate as well.  Based on labs, I suspect patient's fever could be due to a urine infection.  Will plan to discharge with cefpodoxime for treatment.  I discussed risk and benefits with patient, she is comfortable with going home at this time but she is given strict return precautions for any worsening symptoms or any persistence and fever.  She is instructed to return to the ER for any worsening symptoms.  Patient stable on discharge and leaves in no acute distress.

## 2024-10-17 NOTE — CONSULT NOTE ADULT - SUBJECTIVE AND OBJECTIVE BOX
Assumed care of patient. Patient brought to the floor via transport. Patient is drowsy upon arrival.  Seizure precautions initiated.   HPI  69M PMH DM2, diabetic neuropathy for which wheelchair bound, orthostatic hypotension, syncope, afib (on eliquis) presenting with seizure like activity- blank stare with shaking in arms. As per wife, patient has had hx of seizures for several years and takes keppra. Previously on 500mg BID and decreased to 500 mg qD. Last seizure about 2 weeks ago, no neurologist/epileptologist. Patient has not been complaining of fevers, chills. + Cough, nonproductive. + dysphagia, denies abdominal pain, denies chest pain/shortness of breath. Denies dysuria, + incontinence for which uses condom catheter at home.     AT [facility], VS /63, HR 84, RR 16, T 98.6F, O2 sat 94%, labs notable for leukocytosis WBC 25, Hgb 11, lactate 3.5 -> 0.9 s/p 2LNS. CXR noted RLL infiltrate s/p 1g ceftriaxone.     Epilepsy risk factors:  Head injury with subsequent LOC?:  Febrile seizures in infancy?:  Hx of CNS infection?:  Family hx of epilepsy?:  Known CNS pathology?:     Prior AEDs      Prior imaging     Prior EEGs     Other diagnostic work-up     Review of Systems:  CONSTITUTIONAL:  No weight loss, fever, chills, weakness or fatigue.  HEENT:  Eyes:  No visual loss, blurred vision, double vision or yellow sclerae. Ears, Nose, Throat:  No hearing loss, sneezing, congestion, runny nose or sore throat.  SKIN:  No rash or itching.  CARDIOVASCULAR:  No chest pain, chest pressure or chest discomfort. No palpitations or edema.  RESPIRATORY:  No shortness of breath, cough or sputum.  GASTROINTESTINAL:  No anorexia, nausea, vomiting or diarrhea. No abdominal pain or blood.  GENITOURINARY: No Burning on urination.   NEUROLOGICAL:  see HPI  MUSCULOSKELETAL:  No muscle, back pain, joint pain or stiffness.  HEMATOLOGIC:  No anemia, bleeding or bruising.  LYMPHATICS:  No enlarged nodes. No history of splenectomy.  PSYCHIATRIC:  No history of depression or anxiety.  ENDOCRINOLOGIC:  No reports of sweating, cold or heat intolerance. No polyuria or polydipsia.  ALLERGIES:  No history of asthma, hives, eczema or rhinitis.       PAST MEDICAL & SURGICAL HISTORY:  Orthostatic hypotension  Atrial fibrillation  Type 2 diabetes mellitus: DM (diabetes mellitus)  Hyperlipidemia: HLD (hyperlipidemia)  Benign neoplasm of brain: diagnosed in 2007  Mononeuritis: Neuropathy  S/P ORIF (open reduction internal fixation) fracture: Right Humerus       FAMILY HISTORY:  Family history of colon cancer (Father)  Family history of diabetes mellitus       Social History:  Alcohol, illicits, smoking?:  Driving?:  Occupation:     Allergies  Allergy Status Unknown  codeine (Other)       MEDICATIONS  (STANDING):  ampicillin/sulbactam  IVPB 3 Gram(s) IV Intermittent every 6 hours  apixaban 5 milliGRAM(s) Oral every 12 hours  atorvastatin 10 milliGRAM(s) Oral at bedtime  cholecalciferol 5000 Unit(s) Oral every 24 hours  dextrose 5%. 1000 milliLiter(s) (50 mL/Hr) IV Continuous <Continuous>  dextrose 50% Injectable 12.5 Gram(s) IV Push once  dextrose 50% Injectable 25 Gram(s) IV Push once  dextrose 50% Injectable 25 Gram(s) IV Push once  folic acid 1 milliGRAM(s) Oral daily  insulin lispro (HumaLOG) corrective regimen sliding scale   SubCutaneous four times a day before meals  levETIRAcetam 500 milliGRAM(s) Oral every 24 hours  midodrine. 5 milliGRAM(s) Oral three times a day  mirtazapine 7.5 milliGRAM(s) Oral at bedtime  risperiDONE   Tablet 1 milliGRAM(s) Oral every 8 hours    MEDICATIONS  (PRN):  dextrose 40% Gel 15 Gram(s) Oral once PRN Blood Glucose LESS THAN 70 milliGRAM(s)/deciliter  glucagon  Injectable 1 milliGRAM(s) IntraMuscular once PRN Glucose LESS THAN 70 milligrams/deciliter       T(C): 36.7 (02-18-20 @ 13:56), Max: 36.9 (02-17-20 @ 18:48)  HR: 73 (02-18-20 @ 13:56) (61 - 92)  BP: 108/62 (02-18-20 @ 13:56) (92/65 - 115/62)  RR: 16 (02-18-20 @ 13:56) (16 - 18)  SpO2: 95% (02-18-20 @ 13:56) (94% - 97%)  Wt(kg): --    General:  Constitutional:  Sitting comfortably in NAD.  Psychiatric: calm, normal affect, no overt anxiety or internal preoccupation  Ears, Nose, Throat: no abnormalities, mucus membranes moist  Neck: supple, no lymphadenopathy or nodules palpable  Cardiovascular: regular rate and rhythm, normal S1/S2, no murmurs   Chest: Clear to bases. 	  Abdomen: soft, non-tender, no hepatosplenomegaly   Extremities: no edema, clubbing or cyanosis  Skin: no rash or neurocutaneous signs     Cognitive:  Orientation, language, memory and knowledge screens intact.  Registration: 	4/4		Serial 7’s: normal		Recall 4/4    Cranial Nerves:  II: Full to confrontation. III/IV/VI: PERRL EOMF No nystagmus  V1V2V3: Symmetric, VII: Face appears symmetric VIII: Normal to screening, IX/X: Palate Elevates Symmetrical  XI: Trapezius Symmetric  XII: Tongue midline  Motor:  Power: 5/5 throughout, tone: normal x 4 limbs, no tremor   Sensation:  Intact to light touch. Intact to pinprick/temperature and vibration.  Coordination/Gait:  Finger-nose-finger intact, normal rapid-alternating movements.  Fine motor normal with normal rapid finger taps and heel-toe tapping   narrow based gait, tandem forward and back ok  hops well on both feet, heel and toe walking normal   Reflexes:  DTR: 2+ symmetric all 4 limbs, no clonus  Plantar responses: Down bilaterally         Labs  CBC Full  -  ( 18 Feb 2020 08:00 )  WBC Count : 16.28 K/uL  RBC Count : 3.03 M/uL  Hemoglobin : 9.3 g/dL  Hematocrit : 28.5 %  Platelet Count - Automated : 268 K/uL  Mean Cell Volume : 94.1 fl  Mean Cell Hemoglobin : 30.7 pg  Mean Cell Hemoglobin Concentration : 32.6 gm/dL  Auto Neutrophil # : 12.14 K/uL  Auto Lymphocyte # : 2.36 K/uL  Auto Monocyte # : 1.07 K/uL  Auto Eosinophil # : 0.55 K/uL  Auto Basophil # : 0.05 K/uL  Auto Neutrophil % : 74.8 %  Auto Lymphocyte % : 14.5 %  Auto Monocyte % : 6.6 %  Auto Eosinophil % : 3.4 %  Auto Basophil % : 0.3 %    02-18    141  |  107  |  36<H>  ----------------------------<  125<H>  4.0   |  24  |  0.80    Ca    9.6      18 Feb 2020 08:00  Mg     1.4     02-18    TPro  6.6  /  Alb  2.9<L>  /  TBili  0.4  /  DBili  x   /  AST  12  /  ALT  9<L>  /  AlkPhos  70  02-18    LIVER FUNCTIONS - ( 18 Feb 2020 08:00 )  Alb: 2.9 g/dL / Pro: 6.6 g/dL / ALK PHOS: 70 U/L / ALT: 9 U/L / AST: 12 U/L / GGT: x           PT/INR - ( 17 Feb 2020 18:43 )   PT: 18.2 sec;   INR: 1.57          PTT - ( 17 Feb 2020 18:43 )  PTT:27.4 sec      EEG: HPI  69M PMH DM2, diabetic neuropathy for which wheelchair bound, orthostatic hypotension, syncope, afib (on eliquis) who was admitted on 2/17/20 for a period of unresponsiveness. As per wife, patient has had hx of seizures for several years and takes keppra. Previously on 500mg BID and decreased to 500 mg qD. Last seizure about 2 weeks ago, no neurologist/epileptologist. Patient has not been complaining of fevers, chills. + Cough, nonproductive. + dysphagia, denies abdominal pain, denies chest pain/shortness of breath. Denies dysuria, + incontinence for which uses condom catheter at home.     AT [facility], VS /63, HR 84, RR 16, T 98.6F, O2 sat 94%, labs notable for leukocytosis WBC 25, Hgb 11, lactate 3.5 -> 0.9 s/p 2LNS. CXR noted RLL infiltrate s/p 1g ceftriaxone.     Wife reports that yesterday morning around 8am, she heard movement in the next room, and upon her arrival she noticed that her  had moved from his wheelchair to the bed. He appeared unresponsive for approximately 1hr, during that time she called EMS. She checked his glucose that was >300, and she gave him 4U humalog and attempted to feed him orange juice. Upon EMS arrival, he was reported to have a very low blood pressure, and he was escorted to the hospital once it was close to normal as per his wife. She noticed that he became more responsive after she administered insulin but he did not communicate before he was taken to [facility] ER. Patient states that he felt dizzy prior to the episode, and usually precede his seizure like episodes. Wife report Keppra was initiated in 2015 for episodes of staring at the ceiling for a few seconds. His dose was adjusted in 2018 to his current regimen 500mg daily. She report that he may have had 1 episode per year as shown by pictures on her phone since Keppra was started. No identifiable risk factors for seizures such as head trauma with LOC, febrile seizures, CNS inflammation or infection or family history of seizures.     Patient reports that he feels great today.     Epilepsy risk factors:  Head injury with subsequent LOC?: Denies  Febrile seizures in infancy?: Denies  Hx of CNS infection?: Denies  Family hx of epilepsy?: Denies  Known CNS pathology?: Denies     Review of Systems:  CONSTITUTIONAL:  No weight loss, fever, chills, weakness or fatigue.  HEENT:  Eyes:  No visual loss, blurred vision, double vision or yellow sclerae. Ears, Nose, Throat:  No hearing loss, sneezing, congestion, runny nose or sore throat.  SKIN:  No rash or itching.  CARDIOVASCULAR:  No chest pain, chest pressure or chest discomfort. No palpitations or edema.  RESPIRATORY:  No shortness of breath, cough or sputum.  GASTROINTESTINAL:  No anorexia, nausea, vomiting or diarrhea. No abdominal pain or blood.  GENITOURINARY: No Burning on urination.   NEUROLOGICAL:  see HPI  MUSCULOSKELETAL:  No muscle, back pain, joint pain or stiffness.  HEMATOLOGIC:  No anemia, bleeding or bruising.  LYMPHATICS:  No enlarged nodes. No history of splenectomy.  PSYCHIATRIC:  No history of depression or anxiety.  ENDOCRINOLOGIC:  No reports of sweating, cold or heat intolerance. No polyuria or polydipsia.  ALLERGIES:  No history of asthma, hives, eczema or rhinitis.       PAST MEDICAL & SURGICAL HISTORY:  Orthostatic hypotension  Atrial fibrillation  Type 2 diabetes mellitus: DM (diabetes mellitus)  Hyperlipidemia: HLD (hyperlipidemia)  Benign neoplasm of brain: diagnosed in 2007  Mononeuritis: Neuropathy  S/P ORIF (open reduction internal fixation) fracture: Right Humerus       FAMILY HISTORY:  Family history of colon cancer (Father)  Family history of diabetes mellitus       Allergies  Allergy Status Unknown  codeine (Other)       MEDICATIONS  (STANDING):  ampicillin/sulbactam  IVPB 3 Gram(s) IV Intermittent every 6 hours  apixaban 5 milliGRAM(s) Oral every 12 hours  atorvastatin 10 milliGRAM(s) Oral at bedtime  cholecalciferol 5000 Unit(s) Oral every 24 hours  dextrose 5%. 1000 milliLiter(s) (50 mL/Hr) IV Continuous <Continuous>  dextrose 50% Injectable 12.5 Gram(s) IV Push once  dextrose 50% Injectable 25 Gram(s) IV Push once  dextrose 50% Injectable 25 Gram(s) IV Push once  folic acid 1 milliGRAM(s) Oral daily  insulin lispro (HumaLOG) corrective regimen sliding scale   SubCutaneous four times a day before meals  levETIRAcetam 500 milliGRAM(s) Oral every 24 hours  midodrine. 5 milliGRAM(s) Oral three times a day  mirtazapine 7.5 milliGRAM(s) Oral at bedtime  risperiDONE   Tablet 1 milliGRAM(s) Oral every 8 hours    MEDICATIONS  (PRN):  dextrose 40% Gel 15 Gram(s) Oral once PRN Blood Glucose LESS THAN 70 milliGRAM(s)/deciliter  glucagon  Injectable 1 milliGRAM(s) IntraMuscular once PRN Glucose LESS THAN 70 milligrams/deciliter       T(C): 36.7 (02-18-20 @ 13:56), Max: 36.9 (02-17-20 @ 18:48)  HR: 73 (02-18-20 @ 13:56) (61 - 92)  BP: 108/62 (02-18-20 @ 13:56) (92/65 - 115/62)  RR: 16 (02-18-20 @ 13:56) (16 - 18)  SpO2: 95% (02-18-20 @ 13:56) (94% - 97%)  Wt(kg): --    General:  Constitutional:  Elderly appearing man who appears older than his stated age in bed in no apparent distress  Psychiatric: calm,  Ears, Nose, Throat: no abnormalities, mucus membranes moist  Neck: supple, no lymphadenopathy or nodules palpable  Extremities: no edema, clubbing or cyanosis  Skin: no rash or neurocutaneous signs     Cognitive:  Alert and Orientated to person, place and date. Language, memory and knowledge screens intact.    Cranial Nerves:  II: Full to confrontation. III/IV/VI: PERRLA 2mm brisk EOMF No nystagmus  V1V2V3: Symmetric, VII: Face appears symmetric VIII: Normal to screening, IX/X: Palate Elevates Symmetrical  XI: Trapezius Symmetric  XII: Tongue midline  Motor:  Power: 5/5 throughout, tone: normal x 4 limbs, no tremor   Sensation:  Intact to light touch.  Coordination/Gait:  Finger-nose-finger intact, gait deferred (wheelchair bound d/t neuropathy)  Reflexes:  DTR: 1+ symmetric all 4 limbs, no clonus  Plantar responses: Down bilaterally       Labs  CBC Full  -  ( 18 Feb 2020 08:00 )  WBC Count : 16.28 K/uL  RBC Count : 3.03 M/uL  Hemoglobin : 9.3 g/dL  Hematocrit : 28.5 %  Platelet Count - Automated : 268 K/uL  Mean Cell Volume : 94.1 fl  Mean Cell Hemoglobin : 30.7 pg  Mean Cell Hemoglobin Concentration : 32.6 gm/dL  Auto Neutrophil # : 12.14 K/uL  Auto Lymphocyte # : 2.36 K/uL  Auto Monocyte # : 1.07 K/uL  Auto Eosinophil # : 0.55 K/uL  Auto Basophil # : 0.05 K/uL  Auto Neutrophil % : 74.8 %  Auto Lymphocyte % : 14.5 %  Auto Monocyte % : 6.6 %  Auto Eosinophil % : 3.4 %  Auto Basophil % : 0.3 %    02-18    141  |  107  |  36<H>  ----------------------------<  125<H>  4.0   |  24  |  0.80    Ca    9.6      18 Feb 2020 08:00  Mg     1.4     02-18    TPro  6.6  /  Alb  2.9<L>  /  TBili  0.4  /  DBili  x   /  AST  12  /  ALT  9<L>  /  AlkPhos  70  02-18    LIVER FUNCTIONS - ( 18 Feb 2020 08:00 )  Alb: 2.9 g/dL / Pro: 6.6 g/dL / ALK PHOS: 70 U/L / ALT: 9 U/L / AST: 12 U/L / GGT: x           PT/INR - ( 17 Feb 2020 18:43 )   PT: 18.2 sec;   INR: 1.57          PTT - ( 17 Feb 2020 18:43 )  PTT:27.4 sec      EEG: Daily Summary:    1)	Mild to moderate generalized background slowing, indicating diffuse or multifocal cerebral dysfunction.  2)	No epileptiform activity and no significant clinical events occurred.    Read by: Cindy Salinas MD      Electronic Signatures:  Cindy Salinas)  (Signed 18-Feb-2020 11:24)  	Authored: EEG REPORT    < from: CT Head No Cont (02.17.20 @ 19:03) >  IMPRESSION: No acute intracranial hemorrhage or transcortical infarct. Parenchymal volume loss. Microvascular ischemic disease.     < end of copied text > HPI  69M PMH DM2, diabetic neuropathy for which wheelchair bound, orthostatic hypotension, syncope, afib (on eliquis) who was admitted on 2/17/20 for a period of unresponsiveness. As per wife, patient has had hx of seizures for several years and takes keppra. Previously on 500mg BID and decreased to 500 mg qD. Last seizure about 2 weeks ago, no neurologist/epileptologist. Patient has not been complaining of fevers, chills. + Cough, nonproductive. + dysphagia, denies abdominal pain, denies chest pain/shortness of breath. Denies dysuria, + incontinence for which uses condom catheter at home.     AT [facility], VS /63, HR 84, RR 16, T 98.6F, O2 sat 94%, labs notable for leukocytosis WBC 25, Hgb 11, lactate 3.5 -> 0.9 s/p 2LNS. CXR noted RLL infiltrate s/p 1g ceftriaxone.     Wife reports that yesterday morning around 8am, she heard movement in the next room, and upon her arrival she noticed that her  had moved from his wheelchair to the bed. He appeared unresponsive for approximately 1hr, during that time she called EMS. She checked his glucose that was >300, and she gave him 4U humalog and attempted to feed him orange juice. Upon EMS arrival, he was reported to have a very low blood pressure, and he was escorted to the hospital once it was close to normal as per his wife. She noticed that he became more responsive after she administered insulin but he did not communicate before he was taken to [facility] ER. Patient states that he felt dizzy prior to the episode, and usually precede his seizure like episodes. Wife report Keppra was initiated in 2015 for episodes of staring at the ceiling for a few seconds. His dose was adjusted in 2018 to his current regimen 500mg daily. She report that he may have had 1 episode per year as shown by pictures on her phone since Keppra was started. No identifiable risk factors for seizures such as head trauma with LOC, febrile seizures, CNS inflammation or infection or family history of seizures.     Patient reports that he feels great today.     Epilepsy risk factors:  Head injury with subsequent LOC?: Denies  Febrile seizures in infancy?: Denies  Hx of CNS infection?: Denies  Family hx of epilepsy?: Denies  Known CNS pathology?: Denies     Review of Systems:  CONSTITUTIONAL:  No weight loss, fever, chills, weakness or fatigue.  HEENT:  Eyes:  No visual loss, blurred vision, double vision or yellow sclerae. Ears, Nose, Throat:  No hearing loss, sneezing, congestion, runny nose or sore throat.  SKIN:  No rash or itching.  CARDIOVASCULAR:  No chest pain, chest pressure or chest discomfort. No palpitations or edema.  RESPIRATORY:  No shortness of breath, cough or sputum.  GASTROINTESTINAL:  No anorexia, nausea, vomiting or diarrhea. No abdominal pain or blood.  GENITOURINARY: No Burning on urination.   NEUROLOGICAL:  see HPI  MUSCULOSKELETAL:  No muscle, back pain, joint pain or stiffness.  HEMATOLOGIC:  No anemia, bleeding or bruising.  LYMPHATICS:  No enlarged nodes. No history of splenectomy.  PSYCHIATRIC:  No history of depression or anxiety.  ENDOCRINOLOGIC:  No reports of sweating, cold or heat intolerance. No polyuria or polydipsia.  ALLERGIES:  No history of asthma, hives, eczema or rhinitis.       PAST MEDICAL & SURGICAL HISTORY:  Orthostatic hypotension  Atrial fibrillation  Type 2 diabetes mellitus: DM (diabetes mellitus)  Hyperlipidemia: HLD (hyperlipidemia)  Benign neoplasm of brain: diagnosed in 2007  Mononeuritis: Neuropathy  S/P ORIF (open reduction internal fixation) fracture: Right Humerus       FAMILY HISTORY:  Family history of colon cancer (Father)  Family history of diabetes mellitus       Allergies  Allergy Status Unknown  codeine (Other)       MEDICATIONS  (STANDING):  ampicillin/sulbactam  IVPB 3 Gram(s) IV Intermittent every 6 hours  apixaban 5 milliGRAM(s) Oral every 12 hours  atorvastatin 10 milliGRAM(s) Oral at bedtime  cholecalciferol 5000 Unit(s) Oral every 24 hours  dextrose 5%. 1000 milliLiter(s) (50 mL/Hr) IV Continuous <Continuous>  dextrose 50% Injectable 12.5 Gram(s) IV Push once  dextrose 50% Injectable 25 Gram(s) IV Push once  dextrose 50% Injectable 25 Gram(s) IV Push once  folic acid 1 milliGRAM(s) Oral daily  insulin lispro (HumaLOG) corrective regimen sliding scale   SubCutaneous four times a day before meals  levETIRAcetam 500 milliGRAM(s) Oral every 24 hours  midodrine. 5 milliGRAM(s) Oral three times a day  mirtazapine 7.5 milliGRAM(s) Oral at bedtime  risperiDONE   Tablet 1 milliGRAM(s) Oral every 8 hours    MEDICATIONS  (PRN):  dextrose 40% Gel 15 Gram(s) Oral once PRN Blood Glucose LESS THAN 70 milliGRAM(s)/deciliter  glucagon  Injectable 1 milliGRAM(s) IntraMuscular once PRN Glucose LESS THAN 70 milligrams/deciliter       T(C): 36.7 (02-18-20 @ 13:56), Max: 36.9 (02-17-20 @ 18:48)  HR: 73 (02-18-20 @ 13:56) (61 - 92)  BP: 108/62 (02-18-20 @ 13:56) (92/65 - 115/62)  RR: 16 (02-18-20 @ 13:56) (16 - 18)  SpO2: 95% (02-18-20 @ 13:56) (94% - 97%)  Wt(kg): --    General:  Constitutional:  Elderly appearing man who appears older than his stated age in bed in no apparent distress  Psychiatric: calm,  Ears, Nose, Throat: no abnormalities, mucus membranes moist  Neck: supple, no lymphadenopathy or nodules palpable  Extremities: no edema, clubbing or cyanosis  Skin: no rash or neurocutaneous signs     Cognitive:  Alert and Orientated to person, place and date. Language, memory and knowledge screens intact.    Cranial Nerves:  II: Full to confrontation. III/IV/VI: PERRLA 2mm brisk EOMF No nystagmus  V1V2V3: Symmetric, VII: Face appears symmetric VIII: Normal to screening, IX/X: Palate Elevates Symmetrical  XI: Trapezius Symmetric  XII: Tongue midline  Motor:  Power: 5/5 throughout, tone: normal x 4 limbs, no tremor   Sensation:  Intact to light touch.  Coordination/Gait:  Finger-nose-finger intact, gait deferred (wheelchair bound d/t neuropathy)  Reflexes:  DTR: 1+ symmetric all 4 limbs, no clonus  Plantar responses: Down bilaterally       Labs  CBC Full  -  ( 18 Feb 2020 08:00 )  WBC Count : 16.28 K/uL  RBC Count : 3.03 M/uL  Hemoglobin : 9.3 g/dL  Hematocrit : 28.5 %  Platelet Count - Automated : 268 K/uL  Mean Cell Volume : 94.1 fl  Mean Cell Hemoglobin : 30.7 pg  Mean Cell Hemoglobin Concentration : 32.6 gm/dL  Auto Neutrophil # : 12.14 K/uL  Auto Lymphocyte # : 2.36 K/uL  Auto Monocyte # : 1.07 K/uL  Auto Eosinophil # : 0.55 K/uL  Auto Basophil # : 0.05 K/uL  Auto Neutrophil % : 74.8 %  Auto Lymphocyte % : 14.5 %  Auto Monocyte % : 6.6 %  Auto Eosinophil % : 3.4 %  Auto Basophil % : 0.3 %    02-18    141  |  107  |  36<H>  ----------------------------<  125<H>  4.0   |  24  |  0.80    Ca    9.6      18 Feb 2020 08:00  Mg     1.4     02-18    TPro  6.6  /  Alb  2.9<L>  /  TBili  0.4  /  DBili  x   /  AST  12  /  ALT  9<L>  /  AlkPhos  70  02-18    LIVER FUNCTIONS - ( 18 Feb 2020 08:00 )  Alb: 2.9 g/dL / Pro: 6.6 g/dL / ALK PHOS: 70 U/L / ALT: 9 U/L / AST: 12 U/L / GGT: x           PT/INR - ( 17 Feb 2020 18:43 )   PT: 18.2 sec;   INR: 1.57          PTT - ( 17 Feb 2020 18:43 )  PTT:27.4 sec      EEG: Daily Summary:    1)	Mild to moderate generalized background slowing, indicating diffuse or multifocal cerebral dysfunction.  2)	No epileptiform activity and no significant clinical events occurred.    Read by: Cindy Salinas MD      CT head reviewed by Dr. Salinas, notable for diffuse atrophy, maximal in the bilateral frontal and temporal regions